# Patient Record
Sex: FEMALE | Race: WHITE | NOT HISPANIC OR LATINO | Employment: OTHER | URBAN - METROPOLITAN AREA
[De-identification: names, ages, dates, MRNs, and addresses within clinical notes are randomized per-mention and may not be internally consistent; named-entity substitution may affect disease eponyms.]

---

## 2017-01-12 DIAGNOSIS — C50.911 BREAST CANCER, RIGHT: ICD-10-CM

## 2017-01-12 RX ORDER — ANASTROZOLE 1 MG/1
TABLET ORAL
Qty: 30 TABLET | Refills: 0 | Status: SHIPPED | OUTPATIENT
Start: 2017-01-12 | End: 2017-01-23 | Stop reason: SDUPTHER

## 2017-01-13 RX ORDER — AMIODARONE HYDROCHLORIDE 100 MG/1
TABLET ORAL
Qty: 30 TABLET | Refills: 0 | Status: SHIPPED | OUTPATIENT
Start: 2017-01-13 | End: 2017-02-13 | Stop reason: SDUPTHER

## 2017-01-23 ENCOUNTER — INFUSION (OUTPATIENT)
Dept: INFUSION THERAPY | Facility: HOSPITAL | Age: 77
End: 2017-01-23
Attending: INTERNAL MEDICINE
Payer: MEDICARE

## 2017-01-23 ENCOUNTER — OFFICE VISIT (OUTPATIENT)
Dept: HEMATOLOGY/ONCOLOGY | Facility: CLINIC | Age: 77
End: 2017-01-23
Payer: MEDICARE

## 2017-01-23 VITALS
OXYGEN SATURATION: 98 % | HEART RATE: 85 BPM | WEIGHT: 131.38 LBS | SYSTOLIC BLOOD PRESSURE: 98 MMHG | TEMPERATURE: 98 F | HEIGHT: 64 IN | BODY MASS INDEX: 22.43 KG/M2 | DIASTOLIC BLOOD PRESSURE: 62 MMHG | RESPIRATION RATE: 20 BRPM

## 2017-01-23 DIAGNOSIS — Z79.811 AROMATASE INHIBITOR USE: ICD-10-CM

## 2017-01-23 DIAGNOSIS — M85.80 OSTEOPENIA: Chronic | ICD-10-CM

## 2017-01-23 DIAGNOSIS — I48.20 CHRONIC ATRIAL FIBRILLATION: ICD-10-CM

## 2017-01-23 DIAGNOSIS — C50.411 MALIGNANT NEOPLASM OF UPPER-OUTER QUADRANT OF RIGHT FEMALE BREAST: Primary | ICD-10-CM

## 2017-01-23 DIAGNOSIS — I42.1 HOCM (HYPERTROPHIC OBSTRUCTIVE CARDIOMYOPATHY): ICD-10-CM

## 2017-01-23 PROCEDURE — 96365 THER/PROPH/DIAG IV INF INIT: CPT | Mod: PO

## 2017-01-23 PROCEDURE — 25000003 PHARM REV CODE 250: Mod: PO | Performed by: INTERNAL MEDICINE

## 2017-01-23 PROCEDURE — 99999 PR PBB SHADOW E&M-EST. PATIENT-LVL III: CPT | Mod: PBBFAC,,, | Performed by: INTERNAL MEDICINE

## 2017-01-23 PROCEDURE — 99213 OFFICE O/P EST LOW 20 MIN: CPT | Mod: 25,S$GLB,, | Performed by: INTERNAL MEDICINE

## 2017-01-23 PROCEDURE — 63600175 PHARM REV CODE 636 W HCPCS: Mod: PO | Performed by: INTERNAL MEDICINE

## 2017-01-23 RX ORDER — HEPARIN 100 UNIT/ML
500 SYRINGE INTRAVENOUS
Status: CANCELLED | OUTPATIENT
Start: 2017-01-23

## 2017-01-23 RX ORDER — SODIUM CHLORIDE 0.9 % (FLUSH) 0.9 %
10 SYRINGE (ML) INJECTION
Status: CANCELLED | OUTPATIENT
Start: 2017-01-23

## 2017-01-23 RX ORDER — ANASTROZOLE 1 MG/1
1 TABLET ORAL DAILY
Qty: 30 TABLET | Refills: 11 | Status: SHIPPED | OUTPATIENT
Start: 2017-01-23 | End: 2017-09-14

## 2017-01-23 RX ADMIN — ZOLEDRONIC ACID 3.3 MG: 4 INJECTION, SOLUTION, CONCENTRATE INTRAVENOUS at 11:01

## 2017-01-23 RX ADMIN — SODIUM CHLORIDE: 900 INJECTION, SOLUTION INTRAVENOUS at 11:01

## 2017-01-23 NOTE — MR AVS SNAPSHOT
"Patient Information     Patient Name Sex Karena Pearl Female 1940      Visit Information        Provider Department Dept Phone Center    2017 11:30 AM [facility] Chemo Infusion [facility] Chemotherapy Infusion [phone] [facility]      Patient Instructions     None      Your Current Medications Are     amiodarone (PACERONE) 100 MG Tab    anastrozole (ARIMIDEX) 1 mg Tab    aspirin 81 mg Tab    atenolol (TENORMIN) 50 MG tablet    atorvastatin (LIPITOR) 20 MG tablet    calcium citrate-vitamin D3 315-200 mg (CITRACAL+D) 315-200 mg-unit per tablet    cholecalciferol, vitamin D3, 1,000 unit capsule    hydrochlorothiazide (MICROZIDE) 12.5 mg capsule    lisinopril 10 MG tablet    walker (ULTRA-LIGHT ROLLATOR) Misc    anastrozole (ARIMIDEX) 1 mg Tab (Discontinued)      Facility-Administered Medications     sodium chloride 0.9% 100 mL flush bag    zoledronic acid (ZOMETA) 3.3 mg in sodium chloride 0.9% 104.13 mL IVPB      Appointments for Next Year     3/16/2017  1:00 PM DEFIBRILLATOR TUNE UP (20 min.) [facility] - Arrhythmia PACEMAKER, ICD    Arrive at check-in approximately 15 minutes before your scheduled appointment time. Bring all outside medical records and imaging, along with a list of your current medications and insurance card.    Clinic [place] - 3rd Floor    2017 10:20 AM ESTABLISHED PATIENT (20 min.) [facility] - Hemotology Oncology Katherine Pemberton MD    Arrive at check-in approximately 15 minutes before your scheduled appointment time. Bring all outside medical records and imaging, along with a list of your current medications and insurance card.    (off [facility]) 3rd Floor         Default Flowsheet Data (last 24 hours)      Amb Complex Vitals Charles        17 1144 17 1046             Measurements    Weight  59.6 kg (131 lb 6.3 oz)       Height  5' 4" (1.626 m)       BSA (Calculated - sq m)  1.64 sq meters       BMI (Calculated)  22.6       BP  98/62       Temp  97.6 °F (36.4 °C)       " Pulse  85       Resp  20       SpO2  98 %       Pain Assessment    Pain Score Zero Zero               Allergies     No Known Allergies      Medications You Received from 01/22/2017 1209 to 01/23/2017 1209        Date/Time Order Dose Route Action     01/23/2017 1155 zoledronic acid (ZOMETA) 3.3 mg in sodium chloride 0.9% 104.13 mL IVPB 3.3 mg Intravenous New Bag      Current Discharge Medication List     Cannot display discharge medications since this is not an admission.

## 2017-01-23 NOTE — PROGRESS NOTES
Hematology/Oncology Established Visit    [facility] Diagnosis: Right sided breast cancer, ER+/MA neg/Mmu0Jck neg    Stage: Unknown. pT1b    Pathology: Infiltrating ductal carcinoma, 0.6cm, margins negative. ER+/MA neg/Axk1Cpq neg.    Prior Treatment: Right breast lumpectomy on 5/5/15 by Dr. Pacheco    Current Treatment: Adjuvant endocrine therapy, started 6/2015    History of Present Illness:  Mr. Young is a 76 y/o female with hypertrophic cardiomyopathy, ICD, and right-sided breast cancer. She had an abnormal screening mammogram in March 2015, followed by abnormal diagnostic mammogram and breast ultrasound. She underwent initial biopsy at [facility], which was positive for breast cancer. She then underwent R breast lumpectomy by Dr. Pacheco on 5/5/15. She did not undergo sentinel LN dissection due to comorbidities and feeling that she would likely not be a candidate for chemotherapy. After our intial meeting, we did discuss that due to her decreased performance status and cardiomyopathy, she was not a candidate for chemotherapy. She also met with Radiation Oncology. They are also recommended against adjuvant radiation. She is taking adjuvant Letrozole. She denies any recurrent hospitalizations for health problems aside from cardiac procedures for ICD/pacemaker implantation, ablation surgery. No recurrent infections. No chronic LE edema. No pulmonary edema. She has hypertrophic obstructive cardiomyopathy, causing diastolic dysfunction, but does not require Lasix. She does have significant SOB and HOBBS. She gets quite winded just walked on her driveway to her mailbox. She cannot walk a full block without stopping to rest.     Today, she comes in today for follow up. She is now living with her sister Kathy. She has chronic arthralgias in knees, hips, occasionally lower back. No breast pain. She is tolerating Arimidex well without any new arthralgias.    ROS:  General:  No wt loss, fever/chills, fatigue, night  sweats  Eyes: No vision problems, pain or inflammation.   Ears/Nose: No difficultly hearing, ear pain, rhinorrhea, or epistaxis  Oropharynx: No ulcers, dysphagia, or odynophagia  Cardiovascular: No chest pains, PND. SOB and HOBBS, chronic  Pulmonary: No cough, sob, hemoptysis  Gastrointestional: No n/v/d, melena, hematochezia, or change in bowel habits  : No dysuria, hematuria, pelvic pain or flank pain  Musculoskeletal: No myalgias, weakness, or arthralgias  Neurological: No headaches, focal deficits, or seizure activity  Endocrine: No heat or cold intolerance   Skin: No rashes pruritus, or lesions  Psychiatric: No symptoms of mood disorders  Heme/Lymph: No lymph node enlargment    Past Medical History   Diagnosis Date    *Atrial fibrillation     *Atrial flutter     Breast cancer     Cardiomyopathy     HOCM (hypertrophic obstructive cardiomyopathy) 9/6/2013    Hyperlipidemia     Hypertension     Macular degeneration     Orthostatic hypotension     Ventricular tachycardia        Social History: . 3 children. Never smoker, no EtOH/illicits. She is Merline Livingston's mother-in-law.    Family History: family history includes Cancer (age of onset: 60) in her brother. No h/o breast cancer in any family members.     Physical Exam:  Vitals:    01/23/17 1046   BP: 98/62   Pulse: 85   Resp: 20   Temp: 97.6 °F (36.4 °C)     Body mass index is 22.55 kg/(m^2).   ECOG PS: 2  General:  AAOx4, no acute distress  HEENT: EOMI. Normocephalic and atraumatic. No maxillary sinus tenderness. External auditory canals clear without lesions. Nasal and oral mucosal membranes moist. Normal dentition and gums.   Neck: no LAD, thyromegaly, normal ROM  Breasts: Left breast without any masses, skin changes. Right breast with faint surgical scar in lateral aspect, nontender. No axillary LAD bilaterally.  Pulmonary: Bilaterally clear to auscultation, Normal effort with no accessory muscle use, no wheezes/rales/rhonchi  CV: Normal  rate, regular rhythm, no murmurs/rubs/gallops, no edema. Pacemaker/ICD in left chest  ABD:  Soft, nontender, nondistended and without hepatosplenomegaly   Ext: No clubbing, cyanosis, or edema, normal ROM  Skin: No rashes, lesions, bruising or petechiae  Neurological: No focal deficits, CN II to XII grossly intact, normal coordination    Psychiatric:  Normal mood, affect and judgement  Hem/Lymph:  No cervical, supraclavicular, axillary LAD.    Labs:    Lab Results   Component Value Date    WBC 5.01 01/23/2017    HGB 12.9 01/23/2017    HCT 38.5 01/23/2017    MCV 92 01/23/2017     01/23/2017     BMP  Lab Results   Component Value Date     01/23/2017    K 3.7 01/23/2017     01/23/2017    CO2 27 01/23/2017    BUN 31 (H) 01/23/2017    CREATININE 1.2 01/23/2017    CALCIUM 9.6 01/23/2017    ANIONGAP 13 01/23/2017    ESTGFRAFRICA 51 (A) 01/23/2017    EGFRNONAA 44 (A) 01/23/2017     Lab Results   Component Value Date    ALT 11 06/17/2016    AST 20 06/17/2016    ALKPHOS 75 06/17/2016    BILITOT 0.7 06/17/2016       No results found for: IRON, TIBC, FERRITIN, SATURATEDIRO  No results found for: [id number]  No results found for: FOLATE  Lab Results   Component Value Date    TSH 3.070 03/17/2016       Imaging:  3/26/15 Mammogram: There is a round mass with indistinct margins seen in the middle lateral region of the right breast. Lesion may be witin the upper right breast.  Images were evaluated with a Computer Aided Detection (CAD) system.   Impression  Mass in the right breast requires additional evaluation. Spot compression is recommended. Ultrasound if necessary.     4/14/15 Mammogram and u/s:  There is an irregular mass with spiculated margins and associated calcifications seen in the right breast at 10 o'clock. Digital tomosynthesis was performed and used in the interpretation of the  Images. Images were evaluated with a Computer Aided Detectin (CAD) system.    RIGHT LIMITED ULTRASOUND FINDINGS:  On  ultrasound, the area in question is hypoechoic and measures 8 millimeters. Ultrasound is suggestive of a solid mass.  Impression  Solid mass in the right breast is highly suggestive of malignancy. Biopsy should be considered.     4/30/15 CXR: No acute abnormality.    Mammogram 4/14/16: Post-surgical scar in the right breast is benign-negative. Routine follow-up mammogram in 1 year is recommended.    Assessment / Plan:  Karena Young is a 76 y.o. female with hypertrophic obstructive cardiomyopathy, CKD comes in for management of breast cancer.     1. Right sided breast cancer: Estrogen positive, NC and Jws4Nen negative. pT1b tumor s/p lumpectomy. Previously discussed the reasons for surgical staging in helping to determine whether adjuvant chemotherapy is recommended or not. Also discussed the risks and benefits of adjuvant chemotherapy. In this patient with advanced age, comorbidities and most importantly significant dyspnea on exertion, would not recommend adjuvant chemotherapy. Do feel that the risks of chemotherapy including infection outweigh the benefits in her case. Therefore, we discussed that she does not need to undergo further surgical staging as it would not change our management. As her tumor is relatively small and has hormone receptor positive / Pcz1Cho negative pattern, she has a good prognosis with adjuvant endocrine therapy for 5 yrs. She was evaluated by [facility] for adjuvant radiation therapy and she was advised against pursuing radiation.   -- Continue Arimidex. Had switched from Letrozole given arthralgias. Will plan to continue for 5-10 years  -- RTC q3 months (BMP needed in 6 months)  -- Plan to f/u every 3-6 months for the 1st 2yrs, then annually to complete 5 yrs of oncology follow up.  -- Mammogram due approx 4/2017    2. Osteopenia: Seen on DEXA 3/26/15. She was started on Calcium and Vit D. 25-OH,Vit D level slightly low at 21 in 3/2016. Given initiation of aromatase inhibitor, will  check renal function and give Zometa every months, renally dosed.   -- BMP for renal function every 6 months.  -- Zometa 3.3mg IV over 15 minutes every 6 months, given today 1/23/17 - due again in 7/2017.     3. Hypertrophic obstructive cardiomyopathy: s/p ETOH ablation 4/2006.   -- Rollator walker requested / ordered    4. Atrial fibrillation: Had transient Atrial flutter and Afib in 2009 and 2010. Also had VT and was placed on Amiodarone for that with no further Afib. Has ICD/pacemaker.     5. Health maintenance: Mammo due in 4/2017. Patient has decided to stop further pap smear screening. Never had a colonoscopy. Risk/benefit discussion regarding colonoscopy held in the past. Pt has opted against colonoscopy as well.    Katherine Pemberton M.D.  Hematology Oncology

## 2017-01-23 NOTE — PLAN OF CARE
Problem: Patient Care Overview  Goal: Plan of Care Review  Outcome: Ongoing (interventions implemented as appropriate)  Pt states she is well

## 2017-02-13 RX ORDER — HYDROCHLOROTHIAZIDE 12.5 MG/1
CAPSULE ORAL
Qty: 30 CAPSULE | Refills: 6 | Status: SHIPPED | OUTPATIENT
Start: 2017-02-13 | End: 2017-09-02 | Stop reason: SDUPTHER

## 2017-02-13 RX ORDER — AMIODARONE HYDROCHLORIDE 100 MG/1
TABLET ORAL
Qty: 30 TABLET | Refills: 0 | Status: SHIPPED | OUTPATIENT
Start: 2017-02-13 | End: 2017-03-14 | Stop reason: SDUPTHER

## 2017-03-14 RX ORDER — AMIODARONE HYDROCHLORIDE 100 MG/1
TABLET ORAL
Qty: 30 TABLET | Refills: 0 | Status: SHIPPED | OUTPATIENT
Start: 2017-03-14 | End: 2017-04-15 | Stop reason: SDUPTHER

## 2017-03-16 ENCOUNTER — CLINICAL SUPPORT (OUTPATIENT)
Dept: ELECTROPHYSIOLOGY | Facility: CLINIC | Age: 77
End: 2017-03-16
Payer: MEDICARE

## 2017-03-16 DIAGNOSIS — I42.1 HYPERTROPHIC OBSTRUCTIVE CARDIOMYOPATHY: ICD-10-CM

## 2017-03-16 DIAGNOSIS — Z95.810 AUTOMATIC IMPLANTABLE CARDIOVERTER-DEFIBRILLATOR IN SITU: ICD-10-CM

## 2017-03-16 PROCEDURE — 93283 PRGRMG EVAL IMPLANTABLE DFB: CPT | Mod: S$GLB,,, | Performed by: INTERNAL MEDICINE

## 2017-03-21 ENCOUNTER — DOCUMENTATION ONLY (OUTPATIENT)
Dept: ELECTROPHYSIOLOGY | Facility: CLINIC | Age: 77
End: 2017-03-21

## 2017-03-21 NOTE — PROGRESS NOTES
Arrhythmia clinic  See email below. AFL strips reviewed by Dr. Hauser.  Will continue to monitor pt for now. No new orders.      MD Fany Crowe RN                     Show me the tracings please   Tx            Previous Messages       ----- Message -----      From: Fany Grijalva RN      Sent: 3/16/2017   1:34 PM        To: Jake Hauser MD, Ernestina Alejandro RN   Subject: AFL                                               Dear Jake,   While checking pt's ICD, there was 2 episodes of AFL on 1/11/17, total time in Mode switch on that date was 14 hrs 31 min. Pt had hx of AFL and AF in [id number] in past. Your last note says no AF since starting amiodarone. Currently not on OACs.   Asymptomatic.     Thanks,   Fany

## 2017-04-16 RX ORDER — AMIODARONE HYDROCHLORIDE 100 MG/1
TABLET ORAL
Qty: 30 TABLET | Refills: 0 | Status: SHIPPED | OUTPATIENT
Start: 2017-04-16 | End: 2017-05-16 | Stop reason: SDUPTHER

## 2017-04-24 ENCOUNTER — OFFICE VISIT (OUTPATIENT)
Dept: HEMATOLOGY/ONCOLOGY | Facility: CLINIC | Age: 77
End: 2017-04-24
Payer: MEDICARE

## 2017-04-24 VITALS
TEMPERATURE: 98 F | SYSTOLIC BLOOD PRESSURE: 110 MMHG | WEIGHT: 131.81 LBS | BODY MASS INDEX: 24.26 KG/M2 | RESPIRATION RATE: 18 BRPM | DIASTOLIC BLOOD PRESSURE: 70 MMHG | HEART RATE: 84 BPM | OXYGEN SATURATION: 99 % | HEIGHT: 62 IN

## 2017-04-24 DIAGNOSIS — C50.411 MALIGNANT NEOPLASM OF UPPER-OUTER QUADRANT OF RIGHT FEMALE BREAST: Primary | ICD-10-CM

## 2017-04-24 DIAGNOSIS — Z79.811 AROMATASE INHIBITOR USE: ICD-10-CM

## 2017-04-24 DIAGNOSIS — M85.80 OSTEOPENIA, UNSPECIFIED LOCATION: Chronic | ICD-10-CM

## 2017-04-24 PROCEDURE — 99213 OFFICE O/P EST LOW 20 MIN: CPT | Mod: S$GLB,,, | Performed by: INTERNAL MEDICINE

## 2017-04-24 PROCEDURE — 99999 PR PBB SHADOW E&M-EST. PATIENT-LVL III: CPT | Mod: PBBFAC,,, | Performed by: INTERNAL MEDICINE

## 2017-04-24 NOTE — PROGRESS NOTES
Hematology/Oncology Established Visit    [facility] Diagnosis: Right sided breast cancer, ER+/VT neg/Gab5Zbq neg    Stage: Unknown. pT1b    Pathology: Infiltrating ductal carcinoma, 0.6cm, margins negative. ER+/VT neg/Hqb5Xyt neg.    Prior Treatment: Right breast lumpectomy on 5/5/15 by Dr. Pacheco    Current Treatment: Adjuvant endocrine therapy, started 6/2015    History of Present Illness:  Mr. Young is a 74 y/o female with hypertrophic cardiomyopathy, ICD, and right-sided breast cancer. She had an abnormal screening mammogram in March 2015, followed by abnormal diagnostic mammogram and breast ultrasound. She underwent initial biopsy at [facility] [place], which was positive for breast cancer. She then underwent R breast lumpectomy by Dr. Pacheco on 5/5/15. She did not undergo sentinel LN dissection due to comorbidities and feeling that she would likely not be a candidate for chemotherapy. After our intial meeting, we did discuss that due to her decreased performance status and cardiomyopathy, she was not a candidate for chemotherapy. She also met with Radiation Oncology. They are also recommended against adjuvant radiation. She is taking adjuvant Letrozole. She denies any recurrent hospitalizations for health problems aside from cardiac procedures for ICD/pacemaker implantation, ablation surgery. No recurrent infections. No chronic LE edema. No pulmonary edema. She has hypertrophic obstructive cardiomyopathy, causing diastolic dysfunction, but does not require Lasix. She does have significant SOB and HOBBS. She gets quite winded just walked on her driveway to her mailbox. She cannot walk a full block without stopping to rest.     Today, she comes in today for follow up. She is now living with her sister Kathy. She has chronic arthralgias in knees, hips, occasionally lower back. No breast pain. She is tolerating Arimidex well without any new arthralgias. She has no new complaints.    ROS:  General:  No wt loss,  fever/chills, fatigue, night sweats  Eyes: No vision problems, pain or inflammation.   Ears/Nose: No difficultly hearing, ear pain, rhinorrhea, or epistaxis  Oropharynx: No ulcers, dysphagia, or odynophagia  Cardiovascular: No chest pains, PND. SOB and HOBBS, chronic  Pulmonary: No cough, sob, hemoptysis  Gastrointestional: No n/v/d, melena, hematochezia, or change in bowel habits  : No dysuria, hematuria, pelvic pain or flank pain  Musculoskeletal: No myalgias, weakness, or arthralgias  Neurological: No headaches, focal deficits, or seizure activity  Endocrine: No heat or cold intolerance   Skin: No rashes pruritus, or lesions  Psychiatric: No symptoms of mood disorders  Heme/Lymph: No lymph node enlargment    Past Medical History:   Diagnosis Date    *Atrial fibrillation     *Atrial flutter     Breast cancer     Cardiomyopathy     HOCM (hypertrophic obstructive cardiomyopathy) 9/6/2013    Hyperlipidemia     Hypertension     Macular degeneration     Orthostatic hypotension     Ventricular tachycardia        Social History: . 3 children. Never smoker, no EtOH/illicits. She is Merline Livingston's mother-in-law.    Family History: family history includes Cancer (age of onset: 60) in her brother. No h/o breast cancer in any family members.     Physical Exam:  Vitals:    04/24/17 1041   BP: 110/70   Pulse: 84   Resp: 18   Temp: 97.5 °F (36.4 °C)     Body mass index is 24.11 kg/(m^2).   ECOG PS: 2  General:  AAOx4, no acute distress  HEENT: EOMI. Normocephalic and atraumatic. No maxillary sinus tenderness. External auditory canals clear without lesions. Nasal and oral mucosal membranes moist. Normal dentition and gums.   Neck: no LAD, thyromegaly, normal ROM  Breasts: Left breast without any masses, skin changes. Right breast with faint surgical scar in lateral aspect, nontender. No axillary LAD bilaterally.  Pulmonary: Bilaterally clear to auscultation, Normal effort with no accessory muscle use, no  wheezes/rales/rhonchi  CV: Normal rate, regular rhythm, no murmurs/rubs/gallops, no edema. Pacemaker/ICD in left chest  ABD:  Soft, nontender, nondistended and without hepatosplenomegaly   Ext: No clubbing, cyanosis, or edema, normal ROM  Skin: No rashes, lesions, bruising or petechiae  Neurological: No focal deficits, CN II to XII grossly intact, normal coordination    Psychiatric:  Normal mood, affect and judgement  Hem/Lymph:  No cervical, supraclavicular, axillary LAD.    Labs:    Lab Results   Component Value Date    WBC 5.01 01/23/2017    HGB 12.9 01/23/2017    HCT 38.5 01/23/2017    MCV 92 01/23/2017     01/23/2017     BMP  Lab Results   Component Value Date     01/23/2017    K 3.7 01/23/2017     01/23/2017    CO2 27 01/23/2017    BUN 31 (H) 01/23/2017    CREATININE 1.2 01/23/2017    CALCIUM 9.6 01/23/2017    ANIONGAP 13 01/23/2017    ESTGFRAFRICA 51 (A) 01/23/2017    EGFRNONAA 44 (A) 01/23/2017     Lab Results   Component Value Date    ALT 11 06/17/2016    AST 20 06/17/2016    ALKPHOS 75 06/17/2016    BILITOT 0.7 06/17/2016       No results found for: IRON, TIBC, FERRITIN, SATURATEDIRO  No results found for: [id number]  No results found for: FOLATE  Lab Results   Component Value Date    TSH 3.070 03/17/2016       Imaging:  3/26/15 Mammogram: There is a round mass with indistinct margins seen in the middle lateral region of the right breast. Lesion may be witin the upper right breast.  Images were evaluated with a Computer Aided Detection (CAD) system.   Impression  Mass in the right breast requires additional evaluation. Spot compression is recommended. Ultrasound if necessary.     4/14/15 Mammogram and u/s:  There is an irregular mass with spiculated margins and associated calcifications seen in the right breast at 10 o'clock. Digital tomosynthesis was performed and used in the interpretation of the  Images. Images were evaluated with a Computer Aided Detectin (CAD) system.    RIGHT  LIMITED ULTRASOUND FINDINGS:  On ultrasound, the area in question is hypoechoic and measures 8 millimeters. Ultrasound is suggestive of a solid mass.  Impression  Solid mass in the right breast is highly suggestive of malignancy. Biopsy should be considered.     4/30/15 CXR: No acute abnormality.    Mammogram 4/14/16: Post-surgical scar in the right breast is benign-negative. Routine follow-up mammogram in 1 year is recommended.    Assessment / Plan:  Karena oYung is a 77 y.o. female with hypertrophic obstructive cardiomyopathy, CKD comes in for management of breast cancer.     1. Right sided breast cancer: Estrogen positive, MD and Mld7Upp negative. pT1b tumor s/p lumpectomy. Previously discussed the reasons for surgical staging in helping to determine whether adjuvant chemotherapy is recommended or not. Also discussed the risks and benefits of adjuvant chemotherapy. In this patient with advanced age, comorbidities and most importantly significant dyspnea on exertion, would not recommend adjuvant chemotherapy. Do feel that the risks of chemotherapy including infection outweigh the benefits in her case. Therefore, we discussed that she does not need to undergo further surgical staging as it would not change our management. As her tumor is relatively small and has hormone receptor positive / Qyd8Xoz negative pattern, she has a good prognosis with adjuvant endocrine therapy for 5 yrs. She was evaluated by [facility] for adjuvant radiation therapy and she was advised against pursuing radiation.   -- Continue Arimidex. Had switched from Letrozole given arthralgias. Will plan to continue for 5-10 years  -- RTC q3 months (BMP needed in 6 months)  -- Plan to f/u every 3-6 months for the 1st 2yrs, then annually to complete 5 yrs of oncology follow up.  -- Mammogram due approx 4/2017    2. Osteopenia: Seen on DEXA 3/26/15. She was started on Calcium and Vit D. 25-OH,Vit D level slightly low at 21 in 3/2016. Given initiation  of aromatase inhibitor, will check renal function and give Zometa every months, renally dosed.   -- BMP for renal function every 6 months.  -- Zometa 3.3mg IV over 15 minutes every 6 months, given 1/23/17 - due again in 7/2017.     3. Hypertrophic obstructive cardiomyopathy: s/p ETOH ablation 4/2006.   -- Rollator walker requested / ordered at past visit.    4. Atrial fibrillation: Had transient Atrial flutter and Afib in 2009 and 2010. Also had VT and was placed on Amiodarone for that with no further Afib. Has ICD/pacemaker.     5. Health maintenance: Mammo due in 4/2017. Patient has decided to stop further pap smear screening. Never had a colonoscopy. Risk/benefit discussion regarding colonoscopy held in the past. Pt has opted against colonoscopy as well.    Katherine Pemberton M.D.  Hematology Oncology

## 2017-04-27 ENCOUNTER — OFFICE VISIT (OUTPATIENT)
Dept: INTERNAL MEDICINE | Facility: CLINIC | Age: 77
End: 2017-04-27
Payer: MEDICARE

## 2017-04-27 ENCOUNTER — LAB VISIT (OUTPATIENT)
Dept: LAB | Facility: HOSPITAL | Age: 77
End: 2017-04-27
Attending: FAMILY MEDICINE
Payer: MEDICARE

## 2017-04-27 VITALS
TEMPERATURE: 98 F | SYSTOLIC BLOOD PRESSURE: 100 MMHG | BODY MASS INDEX: 24.14 KG/M2 | WEIGHT: 131.19 LBS | HEIGHT: 62 IN | DIASTOLIC BLOOD PRESSURE: 60 MMHG

## 2017-04-27 DIAGNOSIS — R41.3 MEMORY LOSS: ICD-10-CM

## 2017-04-27 DIAGNOSIS — R41.3 MEMORY LOSS: Primary | ICD-10-CM

## 2017-04-27 LAB
BILIRUB UR QL STRIP: NEGATIVE
CLARITY UR REFRACT.AUTO: CLEAR
COLOR UR AUTO: YELLOW
GLUCOSE UR QL STRIP: NEGATIVE
HGB UR QL STRIP: NEGATIVE
KETONES UR QL STRIP: NEGATIVE
LEUKOCYTE ESTERASE UR QL STRIP: NEGATIVE
MICROSCOPIC COMMENT: NORMAL
NITRITE UR QL STRIP: NEGATIVE
PH UR STRIP: 5 [PH] (ref 5–8)
PROT UR QL STRIP: NEGATIVE
SP GR UR STRIP: 1.01 (ref 1–1.03)
SQUAMOUS #/AREA URNS AUTO: 1 /HPF
URN SPEC COLLECT METH UR: NORMAL
UROBILINOGEN UR STRIP-ACNC: NEGATIVE EU/DL

## 2017-04-27 PROCEDURE — 99999 PR PBB SHADOW E&M-EST. PATIENT-LVL III: CPT | Mod: PBBFAC,,, | Performed by: FAMILY MEDICINE

## 2017-04-27 PROCEDURE — 84443 ASSAY THYROID STIM HORMONE: CPT

## 2017-04-27 PROCEDURE — 99214 OFFICE O/P EST MOD 30 MIN: CPT | Mod: S$GLB,,, | Performed by: FAMILY MEDICINE

## 2017-04-27 PROCEDURE — 85025 COMPLETE CBC W/AUTO DIFF WBC: CPT

## 2017-04-27 PROCEDURE — 36415 COLL VENOUS BLD VENIPUNCTURE: CPT | Mod: PO

## 2017-04-27 PROCEDURE — 80053 COMPREHEN METABOLIC PANEL: CPT

## 2017-04-27 PROCEDURE — 82607 VITAMIN B-12: CPT

## 2017-04-27 NOTE — MR AVS SNAPSHOT
[facility]  [address] [place] [address]  Phone: [phone]  Fax: [phone]                  Karena Young   2017 2:20 PM   Office Visit    Description:  Female : 1940   Provider:  Lee Dwyer MD   Department:  [facility]           Reason for Visit     Memory Loss           Diagnoses this Visit        Comments    Memory loss    -  Primary            To Do List           Future Appointments        Provider Department Dept Phone    2017 9:30 AM [facility] MAMMO2-DX [facility] [phone]    5/3/2017 2:00 PM BR CT1 LIMIT 500 LBS [facility] - [phone]    2017 8:00 AM HOME MONITOR DEVICE CHECK, [facility] - Arrhythmia [phone]    2017 10:20 AM LABORATORY, [facility] [phone]    2017 10:40 AM Katherine Pemberton MD [facility] Hemotology Oncology [phone]      Goals (5 Years of Data)     None      [facility]     [facility] Nurse Care Line -  Assistance  Unless otherwise directed by your provider, please contact [facility], our nurse care line that is available for  assistance.     Registered nurses in the [facility] provide: appointment scheduling, clinical advisement, health education, and other advisory services.  Call: [phone] (toll free)               Medications           Message regarding Medications     Verify the changes and/or additions to your medication regime listed below are the same as discussed with your clinician today.  If any of these changes or additions are incorrect, please notify your healthcare provider.             Verify that the below list of medications is an accurate representation of the medications you are currently taking.  If none reported, the list may be blank. If incorrect, please contact your healthcare provider. Carry this list with you in case of emergency.          "  Current Medications     amiodarone (PACERONE) 100 MG Tab TAKE 1 TABLET BY MOUTH EVERY DAY    anastrozole (ARIMIDEX) 1 mg Tab Take 1 tablet (1 mg total) by mouth once daily.    aspirin 81 mg Tab Take 1 tablet by mouth Daily.     atenolol (TENORMIN) 50 MG tablet Take 1 tablet (50 mg total) by mouth every evening.    atorvastatin (LIPITOR) 20 MG tablet TAKE 1 TABLET BY MOUTH DAILY    calcium citrate-vitamin D3 315-200 mg (CITRACAL+D) 315-200 mg-unit per tablet Take 1 tablet by mouth 2 (two) times daily.      hydrochlorothiazide (MICROZIDE) 12.5 mg capsule TAKE 1 CAPSULE(12.5 MG) BY MOUTH EVERY DAY    lisinopril 10 MG tablet Take 0.5 tablets (5 mg total) by mouth once daily. 1 tablet Oral Every day    cholecalciferol, vitamin D3, 1,000 unit capsule Take 1 capsule (1,000 Units total) by mouth once daily.    walker (ULTRA-LIGHT ROLLATOR) Misc 1 each by Misc.(Non-Drug; Combo Route) route daily as needed.           Clinical Reference Information           Your Vitals Were     BP Temp Height Weight BMI    100/60 (BP Location: Right arm, Patient Position: Sitting, BP Method: Manual) 97.5 °F (36.4 °C) (Tympanic) 5' 2" (1.575 m) 59.5 kg (131 lb 2.8 oz) 23.99 kg/m2      Blood Pressure          Most Recent Value    BP  100/60      Allergies as of 4/27/2017     No Known Allergies      Immunizations Administered on Date of Encounter - 4/27/2017     None      Orders Placed During Today's Visit     Future Labs/Procedures Expected by Expires    CBC auto differential  4/27/2017 7/26/2017    Comprehensive metabolic panel  4/27/2017 7/26/2017    CT Head Without Contrast  4/27/2017 4/27/2018    TSH  4/27/2017 7/26/2017    Urinalysis  4/27/2017 7/26/2017    Vitamin B12  4/27/2017 7/26/2017      [facility] Sign-Up     Activating your [facility] account is as easy as 1-2-3!     1) Visit [email or web address], select Sign Up Now, enter this activation code and your date of birth, then select Next.  Activation code not generated  Current Patient " Portal Status: Account disabled      2) Create a username and password to use when you visit [facility] in the future and select a security question in case you lose your password and select Next.    3) Enter your e-mail address and click Sign Up!    Additional Information  If you have questions, please e-mail [email or web address] or call [phone] to talk to our [facility] staff. Remember, [facility] is NOT to be used for urgent needs. For medical emergencies, dial 911.         Language Assistance Services     ATTENTION: Language assistance services are available, free of charge. Please call [phone].      ATENCIÓN: Si habla juan pablo, tiene a hewitt disposición servicios gratuitos de asistencia lingüística. Llame al [phone].     CHÚ Ý: N?u b?n nói Ti?ng Vi?t, có các d?ch v? h? tr? ngôn ng? mi?n phí dành cho b?n. G?i s? [phone].         [facility] complies with applicable Federal civil rights laws and does not discriminate on the basis of race, color, national origin, age, disability, or sex.

## 2017-04-28 LAB
ALBUMIN SERPL BCP-MCNC: 4.1 G/DL
ALP SERPL-CCNC: 76 U/L
ALT SERPL W/O P-5'-P-CCNC: 14 U/L
ANION GAP SERPL CALC-SCNC: 11 MMOL/L
AST SERPL-CCNC: 22 U/L
BASOPHILS # BLD AUTO: 0.02 K/UL
BASOPHILS NFR BLD: 0.4 %
BILIRUB SERPL-MCNC: 0.5 MG/DL
BUN SERPL-MCNC: 42 MG/DL
CALCIUM SERPL-MCNC: 10.2 MG/DL
CHLORIDE SERPL-SCNC: 98 MMOL/L
CO2 SERPL-SCNC: 30 MMOL/L
CREAT SERPL-MCNC: 1.5 MG/DL
DIFFERENTIAL METHOD: NORMAL
EOSINOPHIL # BLD AUTO: 0.1 K/UL
EOSINOPHIL NFR BLD: 2.7 %
ERYTHROCYTE [DISTWIDTH] IN BLOOD BY AUTOMATED COUNT: 13.4 %
EST. GFR  (AFRICAN AMERICAN): 38.5 ML/MIN/1.73 M^2
EST. GFR  (NON AFRICAN AMERICAN): 33.4 ML/MIN/1.73 M^2
GLUCOSE SERPL-MCNC: 89 MG/DL
HCT VFR BLD AUTO: 41.1 %
HGB BLD-MCNC: 13.6 G/DL
LYMPHOCYTES # BLD AUTO: 1 K/UL
LYMPHOCYTES NFR BLD: 18.6 %
MCH RBC QN AUTO: 30.4 PG
MCHC RBC AUTO-ENTMCNC: 33.1 %
MCV RBC AUTO: 92 FL
MONOCYTES # BLD AUTO: 0.6 K/UL
MONOCYTES NFR BLD: 10.6 %
NEUTROPHILS # BLD AUTO: 3.5 K/UL
NEUTROPHILS NFR BLD: 67.5 %
PLATELET # BLD AUTO: 243 K/UL
PMV BLD AUTO: 11.3 FL
POTASSIUM SERPL-SCNC: 3.8 MMOL/L
PROT SERPL-MCNC: 7.6 G/DL
RBC # BLD AUTO: 4.47 M/UL
SODIUM SERPL-SCNC: 139 MMOL/L
TSH SERPL DL<=0.005 MIU/L-ACNC: 3.1 UIU/ML
VIT B12 SERPL-MCNC: 256 PG/ML
WBC # BLD AUTO: 5.21 K/UL

## 2017-04-30 NOTE — PROGRESS NOTES
"Subjective:      Patient ID: Karena Young is a 77 y.o. female.    Chief Complaint: Memory Loss    HPI  76 yo female here today with her sister Bernabe and her daughter.  Pt's family has noticed a change in her memory.  Mainly issues with short term memory loss.  Pt agrees.  She does suffer with hearing loss and does have hearing aides but feels that contributes as well.  Lost her  about 7 mos ago, moved in with her sister.  This seemed to have really make the memory changes more obvious.    Stable with her meds, sister helps to make sure she has them as she may forget.  Living in [place] now.    Denies feeling bad.  Sleeping well.    Some sadness/feeling down when she thinks about her .    Not really driving anymore.  Performing ADLs on her own.    Past Medical History:   Diagnosis Date    *Atrial fibrillation     *Atrial flutter     Breast cancer     Cardiomyopathy     HOCM (hypertrophic obstructive cardiomyopathy) 9/6/2013    Hyperlipidemia     Hypertension     Macular degeneration     Orthostatic hypotension     Ventricular tachycardia      Family History   Problem Relation Age of Onset    Cancer Brother 60     Past Surgical History:   Procedure Laterality Date    BREAST BIOPSY      CARDIAC ELECTROPHYSIOLOGY STUDY AND ABLATION  4/06    CATARACT EXTRACTION      ICD implantation      INSERT / REPLACE / REMOVE PACEMAKER       Social History   Substance Use Topics    Smoking status: Never Smoker    Smokeless tobacco: Never Used    Alcohol use No       /60 (BP Location: Right arm, Patient Position: Sitting, BP Method: Manual)  Temp 97.5 °F (36.4 °C) (Tympanic)   Ht 5' 2" (1.575 m)  Wt 59.5 kg (131 lb 2.8 oz)  BMI 23.99 kg/m2    Review of Systems   Constitutional: Negative.    HENT: Positive for hearing loss.    Respiratory: Negative.    Cardiovascular: Negative.    Gastrointestinal: Negative.    Genitourinary: Negative.    Neurological: Negative.      Objective: "     Physical Exam   Constitutional: She is oriented to person, place, and time. She appears well-developed and well-nourished. No distress.   Eyes: Pupils are equal, round, and reactive to light.   Neck: Normal range of motion. Neck supple. No thyromegaly present.   Cardiovascular: Normal rate, regular rhythm and normal heart sounds.    Pulmonary/Chest: Effort normal and breath sounds normal. No respiratory distress. She has no wheezes. She has no rales.   Abdominal: Soft. Bowel sounds are normal. She exhibits no distension. There is no tenderness.   Musculoskeletal: She exhibits no edema.   Neurological: She is alert and oriented to person, place, and time. No cranial nerve deficit.   Skin: Skin is warm and dry. No rash noted.   Psychiatric: She has a normal mood and affect. Her behavior is normal. Judgment and thought content normal.   Nursing note and vitals reviewed.      Lab Results   Component Value Date    WBC 5.21 04/27/2017    HGB 13.6 04/27/2017    HCT 41.1 04/27/2017     04/27/2017    CHOL 169 03/17/2016    TRIG 77 03/17/2016    HDL 56 03/17/2016    ALT 14 04/27/2017    AST 22 04/27/2017     04/27/2017    K 3.8 04/27/2017    CL 98 04/27/2017    CREATININE 1.5 (H) 04/27/2017    BUN 42 (H) 04/27/2017    CO2 30 (H) 04/27/2017    TSH 3.105 04/27/2017    INR 1.0 08/14/2013       Assessment:     1. Memory loss       Plan:   Memory loss  -     CT Head Without Contrast; Future; Expected date: 4/27/17  -     CBC auto differential; Future; Expected date: 4/27/17  -     Comprehensive metabolic panel; Future; Expected date: 4/27/17  -     TSH; Future; Expected date: 4/27/17  -     Vitamin B12; Future; Expected date: 4/27/17  -     Urinalysis; Future; Expected date: 4/27/17    Other orders  -     Urinalysis Microscopic    Mini mental exam 25/30.    Some mild impairment noted.  Will CT of brain and some labs/UA.  Discussed likelihood of some early dementia changes with pt, sister and daughter.    Discussed  meds and will consider starting aricept once above is reviewed.  Would advise f/u in about 3  mos

## 2017-05-03 ENCOUNTER — HOSPITAL ENCOUNTER (OUTPATIENT)
Dept: RADIOLOGY | Facility: HOSPITAL | Age: 77
Discharge: HOME | End: 2017-05-03
Attending: FAMILY MEDICINE
Payer: MEDICARE

## 2017-05-03 DIAGNOSIS — R41.3 MEMORY LOSS: ICD-10-CM

## 2017-05-03 PROCEDURE — 70450 CT HEAD/BRAIN W/O DYE: CPT | Mod: TC

## 2017-05-05 ENCOUNTER — TELEPHONE (OUTPATIENT)
Dept: INTERNAL MEDICINE | Facility: CLINIC | Age: 77
End: 2017-05-05

## 2017-05-05 RX ORDER — DONEPEZIL HYDROCHLORIDE 10 MG/1
10 TABLET, FILM COATED ORAL NIGHTLY
Qty: 30 TABLET | Refills: 11 | Status: SHIPPED | OUTPATIENT
Start: 2017-05-05 | End: 2018-05-24 | Stop reason: SDUPTHER

## 2017-05-05 NOTE — TELEPHONE ENCOUNTER
CT scan ok  Urine ok  Labs overall ok  Would like for pt to take some B complex vitamins.  I am sending aricept to start for memory.  Take 1/2 pill for first week, then the full pill.  Take at bedtime  F/u with me in 12 wks.

## 2017-05-05 NOTE — TELEPHONE ENCOUNTER
Called and let know that Dr. Dwyer said CT scan okay.  Urine okay, labs overall okay.  She would like for her to take some Vit B complex daily.  She sent in a Rx for Aricept, she will take 1/2 pill for first week, and then start 1 full pill.  Take this a bedtime.  Booked follow up on 7-28-17.

## 2017-05-16 RX ORDER — AMIODARONE HYDROCHLORIDE 100 MG/1
TABLET ORAL
Qty: 30 TABLET | Refills: 0 | Status: SHIPPED | OUTPATIENT
Start: 2017-05-16 | End: 2017-06-11 | Stop reason: SDUPTHER

## 2017-05-26 ENCOUNTER — HOSPITAL ENCOUNTER (OUTPATIENT)
Dept: RADIOLOGY | Facility: HOSPITAL | Age: 77
Discharge: HOME | End: 2017-05-26
Attending: INTERNAL MEDICINE
Payer: MEDICARE

## 2017-05-26 DIAGNOSIS — C50.411 MALIGNANT NEOPLASM OF UPPER-OUTER QUADRANT OF RIGHT FEMALE BREAST: ICD-10-CM

## 2017-05-26 PROCEDURE — 77066 DX MAMMO INCL CAD BI: CPT | Mod: TC

## 2017-05-26 PROCEDURE — 77062 BREAST TOMOSYNTHESIS BI: CPT | Mod: 26,,, | Performed by: RADIOLOGY

## 2017-05-26 PROCEDURE — 77066 DX MAMMO INCL CAD BI: CPT | Mod: 26,,, | Performed by: RADIOLOGY

## 2017-05-30 ENCOUNTER — TELEPHONE (OUTPATIENT)
Dept: HEMATOLOGY/ONCOLOGY | Facility: CLINIC | Age: 77
End: 2017-05-30

## 2017-05-30 NOTE — TELEPHONE ENCOUNTER
----- Message from Katherine Pemberton MD sent at 5/29/2017  8:06 AM CDT -----  Your mammogram looks good!. Repeat mammogram is due in 1 year.

## 2017-06-12 RX ORDER — ATORVASTATIN CALCIUM 20 MG/1
TABLET, FILM COATED ORAL
Qty: 90 TABLET | Refills: 2 | Status: SHIPPED | OUTPATIENT
Start: 2017-06-12 | End: 2018-04-07 | Stop reason: SDUPTHER

## 2017-06-12 RX ORDER — AMIODARONE HYDROCHLORIDE 100 MG/1
TABLET ORAL
Qty: 30 TABLET | Refills: 0 | Status: SHIPPED | OUTPATIENT
Start: 2017-06-12 | End: 2017-07-28 | Stop reason: SDUPTHER

## 2017-07-17 ENCOUNTER — INFUSION (OUTPATIENT)
Dept: INFUSION THERAPY | Facility: HOSPITAL | Age: 77
End: 2017-07-17
Attending: INTERNAL MEDICINE
Payer: MEDICARE

## 2017-07-17 ENCOUNTER — OFFICE VISIT (OUTPATIENT)
Dept: HEMATOLOGY/ONCOLOGY | Facility: CLINIC | Age: 77
End: 2017-07-17
Payer: MEDICARE

## 2017-07-17 ENCOUNTER — OFFICE VISIT (OUTPATIENT)
Dept: OPHTHALMOLOGY | Facility: CLINIC | Age: 77
End: 2017-07-17
Payer: MEDICARE

## 2017-07-17 VITALS
WEIGHT: 125 LBS | HEART RATE: 82 BPM | BODY MASS INDEX: 21.34 KG/M2 | DIASTOLIC BLOOD PRESSURE: 70 MMHG | HEIGHT: 64 IN | TEMPERATURE: 98 F | RESPIRATION RATE: 18 BRPM | OXYGEN SATURATION: 97 % | SYSTOLIC BLOOD PRESSURE: 110 MMHG

## 2017-07-17 DIAGNOSIS — H34.8192 CENTRAL RETINAL VEIN OCCLUSION: Primary | ICD-10-CM

## 2017-07-17 DIAGNOSIS — H35.372 EPIRETINAL MEMBRANE, LEFT: ICD-10-CM

## 2017-07-17 DIAGNOSIS — C50.411 MALIGNANT NEOPLASM OF UPPER-OUTER QUADRANT OF RIGHT BREAST IN FEMALE, ESTROGEN RECEPTOR POSITIVE: Primary | ICD-10-CM

## 2017-07-17 DIAGNOSIS — Z79.811 AROMATASE INHIBITOR USE: ICD-10-CM

## 2017-07-17 DIAGNOSIS — I42.1 HOCM (HYPERTROPHIC OBSTRUCTIVE CARDIOMYOPATHY): ICD-10-CM

## 2017-07-17 DIAGNOSIS — Z17.0 MALIGNANT NEOPLASM OF UPPER-OUTER QUADRANT OF RIGHT BREAST IN FEMALE, ESTROGEN RECEPTOR POSITIVE: Primary | ICD-10-CM

## 2017-07-17 DIAGNOSIS — M85.89 OSTEOPENIA OF MULTIPLE SITES: ICD-10-CM

## 2017-07-17 PROCEDURE — 92014 COMPRE OPH EXAM EST PT 1/>: CPT | Mod: S$GLB,,, | Performed by: OPHTHALMOLOGY

## 2017-07-17 PROCEDURE — 63600175 PHARM REV CODE 636 W HCPCS: Mod: PO | Performed by: INTERNAL MEDICINE

## 2017-07-17 PROCEDURE — 99214 OFFICE O/P EST MOD 30 MIN: CPT | Mod: 25,S$GLB,, | Performed by: INTERNAL MEDICINE

## 2017-07-17 PROCEDURE — 99999 PR PBB SHADOW E&M-EST. PATIENT-LVL III: CPT | Mod: PBBFAC,,, | Performed by: INTERNAL MEDICINE

## 2017-07-17 PROCEDURE — 99999 PR PBB SHADOW E&M-EST. PATIENT-LVL II: CPT | Mod: PBBFAC,,, | Performed by: OPHTHALMOLOGY

## 2017-07-17 PROCEDURE — 25000003 PHARM REV CODE 250: Mod: PO | Performed by: INTERNAL MEDICINE

## 2017-07-17 PROCEDURE — 96365 THER/PROPH/DIAG IV INF INIT: CPT | Mod: PO

## 2017-07-17 PROCEDURE — 1126F AMNT PAIN NOTED NONE PRSNT: CPT | Mod: S$GLB,,, | Performed by: INTERNAL MEDICINE

## 2017-07-17 PROCEDURE — 1159F MED LIST DOCD IN RCRD: CPT | Mod: S$GLB,,, | Performed by: INTERNAL MEDICINE

## 2017-07-17 RX ORDER — HEPARIN 100 UNIT/ML
500 SYRINGE INTRAVENOUS
Status: CANCELLED | OUTPATIENT
Start: 2017-07-17

## 2017-07-17 RX ORDER — SODIUM CHLORIDE 0.9 % (FLUSH) 0.9 %
10 SYRINGE (ML) INJECTION
Status: CANCELLED | OUTPATIENT
Start: 2017-07-17

## 2017-07-17 RX ADMIN — ZOLEDRONIC ACID 3 MG: 4 INJECTION, SOLUTION, CONCENTRATE INTRAVENOUS at 11:07

## 2017-07-17 NOTE — PROGRESS NOTES
===============================  07/18/2017   Karena Young,   77 y.o. female   Last visit [facility]: :10/25/2016   Last visit eye dept. Visit date not found  [facility]:  Corrected distance visual acuity was 20/20 in the right eye and 20/50 in the left eye.  Tonometry     Tonometry (Applanation, 1:11 PM)       Right Left    Pressure 17 17               Not recorded         Not recorded        Chief Complaint   Patient presents with    Macular Degeneration     BLURRY VISION    CRVO        HPI     Macular Degeneration    Additional comments: BLURRY VISION           Comments   NO DM  'AA' SMD  PCIOL OU  OD OLD RVO  OS ERM       Last edited by Cristina Layne on 7/17/2017  1:01 PM. (History)      Read Studies:   Vitals  ________________  7/17/2017  Problem List Items Addressed This Visit        Eye/Vision problems    Epiretinal membrane    Central retinal vein occlusion - Primary      Other Visit Diagnoses    None.       Stable  rtc 1 year.  .       ===========================

## 2017-07-17 NOTE — PLAN OF CARE
Problem: Patient Care Overview  Goal: Plan of Care Review  Outcome: Ongoing (interventions implemented as appropriate)  im doing ok no complaints my sister takes good care of me

## 2017-07-17 NOTE — PATIENT INSTRUCTIONS
[facility]  [address]  [address] [address]  [phone] phone     [phone] fax  Hours of Operation: Monday- Friday 8:00am- 5:00pm  After hours phone  [phone]  Hematology / Oncology Physicians on call      Dr. Bib Pemberton                      Please call with any concerns regarding your appointment today.  FALL PREVENTION   Falls often occur due to slipping, tripping or losing your balance. Here are ways to reduce your risk of falling again.   Was there anything that caused your fall that can be fixed, removed or replaced?   Make your home safe by keeping walkways clear of objects you may trip over.   Use non-slip pads under rugs.   Do not walk in poorly lit areas.   Do not stand on chairs or wobbly ladders.   Use caution when reaching overhead or looking upward. This position can cause a loss of balance.   Be sure your shoes fit properly, have non-slip bottoms and are in good condition.   Be cautious when going up and down stairs, curbs, and when walking on uneven sidewalks.   If your balance is poor, consider using a cane or walker.   If your fall was related to alcohol use, stop or limit alcohol intake.   If your fall was related to use of sleeping medicines, talk to your doctor about this. You may need to reduce your dosage at bedtime if you awaken during the night to go to the bathroom.   To reduce the need for nighttime bathroom trips:   Avoid drinking fluids for several hours before going to bed   Empty your bladder before going to bed   Men can keep a urinal at the bedside   © 1014-9510 [employer], [address] [place] [address], [place] [address]. All rights reserved. This information is not intended as a substitute for professional medical care. Always follow your healthcare professional's instructions.

## 2017-07-17 NOTE — PROGRESS NOTES
Hematology/Oncology Established Visit    [facility] Diagnosis: Right sided breast cancer, ER+/NJ neg/[id number] neg    Stage: Unknown. pT1b    Pathology: Infiltrating ductal carcinoma, 0.6cm, margins negative. ER+/NJ neg/[id number] neg.    Prior Treatment: Right breast lumpectomy on 5/5/15 by Dr. Pacheco    Current Treatment: Adjuvant endocrine therapy, started 6/2015    History of Present Illness:  Mr. Young is a 76 y/o female with hypertrophic cardiomyopathy, ICD, and right-sided breast cancer. She had an abnormal screening mammogram in March 2015, followed by abnormal diagnostic mammogram and breast ultrasound. She underwent initial biopsy at [facility], which was positive for breast cancer. She then underwent R breast lumpectomy by Dr. Pacheco on 5/5/15. She did not undergo sentinel LN dissection due to comorbidities and feeling that she would likely not be a candidate for chemotherapy. After our intial meeting, we did discuss that due to her decreased performance status and cardiomyopathy, she was not a candidate for chemotherapy. She also met with Radiation Oncology. They are also recommended against adjuvant radiation. She is taking adjuvant Letrozole. She denies any recurrent hospitalizations for health problems aside from cardiac procedures for ICD/pacemaker implantation, ablation surgery. No recurrent infections. No chronic LE edema. No pulmonary edema. She has hypertrophic obstructive cardiomyopathy, causing diastolic dysfunction, but does not require Lasix. She does have significant SOB and HOBBS. She gets quite winded just walked on her driveway to her mailbox. She cannot walk a full block without stopping to rest.     Today, she comes in today for follow up. She is now living with her sister Kathy. She has chronic arthralgias in knees, hips, occasionally lower back. No breast pain. She is tolerating Arimidex well without any new arthralgias. She has no new complaints.    Answers for HPI/ROS submitted by  the patient on 7/17/2017   appetite change : No  unexpected weight change: No  visual disturbance: No  cough: No  shortness of breath: Yes  chest pain: No  abdominal pain: No  diarrhea: No  frequency: No  back pain: No  rash: No  headaches: No  adenopathy: No  nervous/ anxious: No      Past Medical History:   Diagnosis Date    *Atrial fibrillation     *Atrial flutter     Breast cancer     Cardiomyopathy     HOCM (hypertrophic obstructive cardiomyopathy) 9/6/2013    Hyperlipidemia     Hypertension     Macular degeneration     Orthostatic hypotension     Ventricular tachycardia        Social History: . 3 children. Never smoker, no EtOH/illicits. She is Merline Livingston's mother-in-law.    Family History: family history includes Cancer (age of onset: 60) in her brother. No h/o breast cancer in any family members.     Physical Exam:  Vitals:    07/17/17 1043   BP: 110/70   Pulse: 82   Resp: 18   Temp: 97.8 °F (36.6 °C)     Body mass index is 21.46 kg/m².   ECOG PS: 2  General:  AAOx4, no acute distress  HEENT: EOMI. Normocephalic and atraumatic. No maxillary sinus tenderness. External auditory canals clear without lesions. Nasal and oral mucosal membranes moist. Normal dentition and gums.   Neck: no LAD, thyromegaly, normal ROM  Breasts: Left breast without any masses, skin changes. Right breast with faint surgical scar in lateral aspect, nontender. No axillary LAD bilaterally.  Pulmonary: Bilaterally clear to auscultation, Normal effort with no accessory muscle use, no wheezes/rales/rhonchi  CV: Normal rate, regular rhythm, no murmurs/rubs/gallops, no edema. Pacemaker/ICD in left chest  ABD:  Soft, nontender, nondistended and without hepatosplenomegaly   Ext: No clubbing, cyanosis, or edema, normal ROM  Skin: No rashes, lesions, bruising or petechiae  Neurological: No focal deficits, CN II to XII grossly intact, normal coordination    Psychiatric:  Normal mood, affect and judgement  Hem/Lymph:  No  cervical, supraclavicular, axillary LAD.    Labs:    Lab Results   Component Value Date    WBC 5.21 04/27/2017    HGB 13.6 04/27/2017    HCT 41.1 04/27/2017    MCV 92 04/27/2017     04/27/2017     BMP  Lab Results   Component Value Date     07/17/2017    K 3.7 07/17/2017    CL 98 07/17/2017    CO2 32 (H) 07/17/2017    BUN 36 (H) 07/17/2017    CREATININE 1.4 07/17/2017    CALCIUM 10.1 07/17/2017    ANIONGAP 10 07/17/2017    ESTGFRAFRICA 42 (A) 07/17/2017    EGFRNONAA 36 (A) 07/17/2017     Lab Results   Component Value Date    ALT 14 04/27/2017    AST 22 04/27/2017    ALKPHOS 76 04/27/2017    BILITOT 0.5 04/27/2017       No results found for: IRON, TIBC, FERRITIN, SATURATEDIRO  Lab Results   Component Value Date    BHVQCBDO12 256 04/27/2017     No results found for: FOLATE  Lab Results   Component Value Date    TSH 3.105 04/27/2017       Imaging:  3/26/15 Mammogram: There is a round mass with indistinct margins seen in the middle lateral region of the right breast. Lesion may be witin the upper right breast.  Images were evaluated with a Computer Aided Detection (CAD) system.   Impression  Mass in the right breast requires additional evaluation. Spot compression is recommended. Ultrasound if necessary.     4/14/15 Mammogram and u/s:  There is an irregular mass with spiculated margins and associated calcifications seen in the right breast at 10 o'clock. Digital tomosynthesis was performed and used in the interpretation of the  Images. Images were evaluated with a Computer Aided Detectin (CAD) system.    RIGHT LIMITED ULTRASOUND FINDINGS:  On ultrasound, the area in question is hypoechoic and measures 8 millimeters. Ultrasound is suggestive of a solid mass.  Impression  Solid mass in the right breast is highly suggestive of malignancy. Biopsy should be considered.     4/30/15 CXR: No acute abnormality.    Mammogram 5/26/17: Post-surgical scar in the right breast is benign-negative. Routine follow-up  mammogram in 1 year is recommended. BI-RADS Category 2: Benign    Assessment / Plan:  Karena Young is a 77 y.o. female with hypertrophic obstructive cardiomyopathy, CKD comes in for management of breast cancer.     1. Right sided breast cancer: Estrogen positive, ME and [id number] negative. pT1b tumor s/p lumpectomy. Previously discussed the reasons for surgical staging in helping to determine whether adjuvant chemotherapy is recommended or not. Also discussed the risks and benefits of adjuvant chemotherapy. In this patient with advanced age, comorbidities and most importantly significant dyspnea on exertion, would not recommend adjuvant chemotherapy. Do feel that the risks of chemotherapy including infection outweigh the benefits in her case. Therefore, we discussed that she does not need to undergo further surgical staging as it would not change our management. As her tumor is relatively small and has hormone receptor positive / Unk7Hjy negative pattern, she has a good prognosis with adjuvant endocrine therapy for 5 yrs. She was evaluated by [facility] for adjuvant radiation therapy and she was advised against pursuing radiation.   -- Continue Arimidex. Had switched from Letrozole given arthralgias. Will plan to continue for 5-10 years  -- RTC q3 months (BMP needed in 6 months)  -- Plan to f/u every 6 months as she is 2 yrs out from diagnosis.   -- Mammogram due approx 4/2018    2. Osteopenia: Seen on DEXA 3/26/15. She was started on Calcium and Vit D. 25-OH,Vit D level slightly low at 21 in 3/2016. Given initiation of aromatase inhibitor, will check renal function and give Zometa every months, renally dosed.   -- BMP for renal function every 6 months.  -- Zometa 3.0mg IV over 15 minutes every 6 months, given 7/17/17 - due again in 1/2018.     3. Hypertrophic obstructive cardiomyopathy: s/p ETOH ablation 4/2006.   -- Rollator walker requested / ordered at past visit.    4. Atrial fibrillation: Had transient Atrial  flutter and Afib in 2009 and 2010. Also had VT and was placed on Amiodarone for that with no further Afib. Has ICD/pacemaker.     5. Health maintenance: Mammo due in 4/2017. Patient has decided to stop further pap smear screening. Never had a colonoscopy. Risk/benefit discussion regarding colonoscopy held in the past. Pt has opted against colonoscopy as well.    Katherine Pemberton M.D.  Hematology Oncology

## 2017-07-21 RX ORDER — AMIODARONE HYDROCHLORIDE 100 MG/1
TABLET ORAL
Qty: 30 TABLET | Refills: 0 | OUTPATIENT
Start: 2017-07-21

## 2017-07-26 ENCOUNTER — OFFICE VISIT (OUTPATIENT)
Dept: INTERNAL MEDICINE | Facility: CLINIC | Age: 77
End: 2017-07-26
Payer: MEDICARE

## 2017-07-26 VITALS
HEART RATE: 58 BPM | TEMPERATURE: 99 F | BODY MASS INDEX: 22.15 KG/M2 | WEIGHT: 125 LBS | SYSTOLIC BLOOD PRESSURE: 130 MMHG | DIASTOLIC BLOOD PRESSURE: 70 MMHG | HEIGHT: 63 IN

## 2017-07-26 DIAGNOSIS — R41.3 MEMORY LOSS: Primary | ICD-10-CM

## 2017-07-26 DIAGNOSIS — M79.10 MYALGIA: ICD-10-CM

## 2017-07-26 DIAGNOSIS — E78.5 DYSLIPIDEMIA: ICD-10-CM

## 2017-07-26 DIAGNOSIS — J34.89 RHINORRHEA: ICD-10-CM

## 2017-07-26 DIAGNOSIS — M25.50 ARTHRALGIA, UNSPECIFIED JOINT: ICD-10-CM

## 2017-07-26 PROCEDURE — 99214 OFFICE O/P EST MOD 30 MIN: CPT | Mod: S$GLB,,, | Performed by: FAMILY MEDICINE

## 2017-07-26 PROCEDURE — 99999 PR PBB SHADOW E&M-EST. PATIENT-LVL III: CPT | Mod: PBBFAC,,, | Performed by: FAMILY MEDICINE

## 2017-07-26 PROCEDURE — 1159F MED LIST DOCD IN RCRD: CPT | Mod: S$GLB,,, | Performed by: FAMILY MEDICINE

## 2017-07-26 PROCEDURE — 1125F AMNT PAIN NOTED PAIN PRSNT: CPT | Mod: S$GLB,,, | Performed by: FAMILY MEDICINE

## 2017-07-26 NOTE — PROGRESS NOTES
"Subjective:      Patient ID: Karena Young is a 77 y.o. female.    Chief Complaint:  F/U memory    HPI  76 yo pleasant female here with her sister today for f/u after starting on Aricept.  She is tolerating the medication fine and her sister feels things are stable.  No worsening of memory, no improvement just about the same.  They are happy with this for now.  She will be moving into a small home on her children's property soon.  Currently with her sister.  Weight down 6 lbs since last visit.  She is on Arimidex and getting IV infusion for bones.  Had infusion last week and is having terrible arthralgias and myalgias.  This has happened a few times.  Dr. Pemberton has been following her.  Has varicose veins in BL legs, no pain just asking if anything needs to be done.    Past Medical History:   Diagnosis Date    *Atrial fibrillation     *Atrial flutter     Breast cancer     Cardiomyopathy     HOCM (hypertrophic obstructive cardiomyopathy) 9/6/2013    Hyperlipidemia     Hypertension     Macular degeneration     Orthostatic hypotension     Ventricular tachycardia      Family History   Problem Relation Age of Onset    Cancer Brother 60     Past Surgical History:   Procedure Laterality Date    BREAST BIOPSY      CARDIAC ELECTROPHYSIOLOGY STUDY AND ABLATION  4/06    CATARACT EXTRACTION      ICD implantation      INSERT / REPLACE / REMOVE PACEMAKER       Social History   Substance Use Topics    Smoking status: Never Smoker    Smokeless tobacco: Never Used    Alcohol use No       /70   Pulse (!) 58   Temp 98.5 °F (36.9 °C) (Tympanic)   Ht 5' 3" (1.6 m)   Wt 56.7 kg (125 lb)   BMI 22.14 kg/m²     Review of Systems   Constitutional: Negative for chills and fever.        Weight down with stressors of moving, sister watching closely   HENT: Positive for rhinorrhea.    Respiratory: Negative.    Cardiovascular: Negative.    Gastrointestinal: Negative.    Musculoskeletal: Positive for arthralgias " and myalgias.     Objective:     Physical Exam   Constitutional: She appears well-developed and well-nourished.   HENT:   Mouth/Throat: Oropharynx is clear and moist.   Neck: Normal range of motion. Neck supple.   Cardiovascular: Normal rate and normal heart sounds.    Pulmonary/Chest: Effort normal and breath sounds normal. No respiratory distress. She has no wheezes.   Abdominal: Soft. Bowel sounds are normal.   Skin: Skin is warm and dry.   BL LE with large/non inflamed varicose veins   Nursing note and vitals reviewed.      Lab Results   Component Value Date    WBC 5.21 04/27/2017    HGB 13.6 04/27/2017    HCT 41.1 04/27/2017     04/27/2017    CHOL 169 03/17/2016    TRIG 77 03/17/2016    HDL 56 03/17/2016    ALT 14 04/27/2017    AST 22 04/27/2017     07/17/2017    K 3.7 07/17/2017    CL 98 07/17/2017    CREATININE 1.4 07/17/2017    BUN 36 (H) 07/17/2017    CO2 32 (H) 07/17/2017    TSH 3.105 04/27/2017    INR 1.0 08/14/2013       Assessment:     1. Memory loss    2. Myalgia    3. Arthralgia, unspecified joint    4. Dyslipidemia    5. Rhinorrhea       Plan:   Memory loss  -     CBC auto differential; Future; Expected date: 01/26/2018    Myalgia    Arthralgia, unspecified joint    Dyslipidemia  -     Comprehensive metabolic panel; Future; Expected date: 01/26/2018  -     Lipid panel; Future; Expected date: 01/26/2018    Rhinorrhea    Claritin or zyrtec for rhinorrhea  Compression stockings/elevation for veins.  Not painful at this time  Cont Aricept for now.  Monitor progress over next 6 mos with moving and such.  Consider adding Namenda.  Ok to use tylenol tid for pain, discuss changing med with Onc  On Zometa and SE are likely from that as it coincides with her last infusion  Monitor weight closely  F/u with labs in 6 mos

## 2017-07-28 ENCOUNTER — TELEPHONE (OUTPATIENT)
Dept: INTERNAL MEDICINE | Facility: CLINIC | Age: 77
End: 2017-07-28

## 2017-07-28 DIAGNOSIS — I48.20 CHRONIC ATRIAL FIBRILLATION: Primary | ICD-10-CM

## 2017-07-28 RX ORDER — AMIODARONE HYDROCHLORIDE 100 MG/1
100 TABLET ORAL DAILY
Qty: 30 TABLET | Refills: 11 | Status: SHIPPED | OUTPATIENT
Start: 2017-07-28 | End: 2017-10-12 | Stop reason: SDUPTHER

## 2017-07-28 NOTE — TELEPHONE ENCOUNTER
----- Message from Abel Gibbons MA sent at 7/28/2017  9:39 AM CDT -----  Justice Castillo LPN 24 minutes ago (9:13 AM)     Pt's sister states, the refill for pt's amiodarone was denied and she's not sure why. After reviewing chart, informed her that refill was sent to the wrong provider. Will send message to Dr. Hauser's office that pt needs a refill on amiodarone. She verbalized understanding.     Documentation

## 2017-07-28 NOTE — TELEPHONE ENCOUNTER
Pt's sister states, the refill for pt's amiodarone was denied and she's not sure why. After reviewing chart, informed her that refill was sent to the wrong provider. Will send message to Dr. Hauser's office that pt needs a refill on amiodarone. She verbalized understanding.

## 2017-07-28 NOTE — TELEPHONE ENCOUNTER
----- Message from Sonam Maki sent at 7/28/2017  8:33 AM CDT -----  Contact: Hui/sister  Hui called to speak with the nurse about some medications for this patient. She can be contacted at [phone] cell.  Thanks,  Sonam

## 2017-09-05 RX ORDER — HYDROCHLOROTHIAZIDE 12.5 MG/1
CAPSULE ORAL
Qty: 30 CAPSULE | Refills: 6 | Status: SHIPPED | OUTPATIENT
Start: 2017-09-05 | End: 2018-01-05

## 2017-09-14 ENCOUNTER — OFFICE VISIT (OUTPATIENT)
Dept: HEMATOLOGY/ONCOLOGY | Facility: CLINIC | Age: 77
End: 2017-09-14
Payer: MEDICARE

## 2017-09-14 VITALS
HEART RATE: 67 BPM | SYSTOLIC BLOOD PRESSURE: 100 MMHG | OXYGEN SATURATION: 99 % | DIASTOLIC BLOOD PRESSURE: 60 MMHG | TEMPERATURE: 98 F | WEIGHT: 123 LBS | BODY MASS INDEX: 21 KG/M2 | RESPIRATION RATE: 18 BRPM | HEIGHT: 64 IN

## 2017-09-14 DIAGNOSIS — Z79.811 AROMATASE INHIBITOR USE: ICD-10-CM

## 2017-09-14 DIAGNOSIS — Z17.0 MALIGNANT NEOPLASM OF UPPER-OUTER QUADRANT OF RIGHT BREAST IN FEMALE, ESTROGEN RECEPTOR POSITIVE: Primary | ICD-10-CM

## 2017-09-14 DIAGNOSIS — C50.411 MALIGNANT NEOPLASM OF UPPER-OUTER QUADRANT OF RIGHT BREAST IN FEMALE, ESTROGEN RECEPTOR POSITIVE: Primary | ICD-10-CM

## 2017-09-14 PROCEDURE — 3008F BODY MASS INDEX DOCD: CPT | Mod: S$GLB,,, | Performed by: INTERNAL MEDICINE

## 2017-09-14 PROCEDURE — 1159F MED LIST DOCD IN RCRD: CPT | Mod: S$GLB,,, | Performed by: INTERNAL MEDICINE

## 2017-09-14 PROCEDURE — 99999 PR PBB SHADOW E&M-EST. PATIENT-LVL IV: CPT | Mod: PBBFAC,,, | Performed by: INTERNAL MEDICINE

## 2017-09-14 PROCEDURE — 1125F AMNT PAIN NOTED PAIN PRSNT: CPT | Mod: S$GLB,,, | Performed by: INTERNAL MEDICINE

## 2017-09-14 PROCEDURE — 3074F SYST BP LT 130 MM HG: CPT | Mod: S$GLB,,, | Performed by: INTERNAL MEDICINE

## 2017-09-14 PROCEDURE — 99215 OFFICE O/P EST HI 40 MIN: CPT | Mod: S$GLB,,, | Performed by: INTERNAL MEDICINE

## 2017-09-14 PROCEDURE — 3078F DIAST BP <80 MM HG: CPT | Mod: S$GLB,,, | Performed by: INTERNAL MEDICINE

## 2017-09-14 RX ORDER — LETROZOLE 2.5 MG/1
2.5 TABLET, FILM COATED ORAL DAILY
Qty: 30 TABLET | Refills: 2 | Status: SHIPPED | OUTPATIENT
Start: 2017-09-14 | End: 2017-12-17 | Stop reason: SDUPTHER

## 2017-09-15 DIAGNOSIS — I48.20 CHRONIC ATRIAL FIBRILLATION: Primary | ICD-10-CM

## 2017-09-15 RX ORDER — METOPROLOL SUCCINATE 50 MG/1
50 TABLET, EXTENDED RELEASE ORAL DAILY
Qty: 30 TABLET | Refills: 11 | Status: ON HOLD | OUTPATIENT
Start: 2017-09-15 | End: 2017-11-12 | Stop reason: HOSPADM

## 2017-09-15 NOTE — PROGRESS NOTES
Hematology/Oncology Office Note  [facility] Diagnosis: Right sided breast cancer, ER+/PA neg/[id number] neg     Stage: Unknown. pT1b     Pathology: Infiltrating ductal carcinoma, 0.6cm, margins negative. ER+/PA neg/Ezg9Wgt neg.     Prior Treatment: Right breast lumpectomy on 5/5/15 by Dr. Pacheco     Current Treatment: Adjuvant endocrine therapy, started 6/2015         CC:  Breast cancer    Referred by:  No ref. provider found    History of Present Illness:  Mr. Young is a 76 y/o female with hypertrophic cardiomyopathy, ICD, and right-sided breast cancer. She had an abnormal screening mammogram in March 2015, followed by abnormal diagnostic mammogram and breast ultrasound. She underwent initial biopsy at [facility], which was positive for breast cancer. She then underwent R breast lumpectomy by Dr. Pacheco on 5/5/15. She did not undergo sentinel LN dissection due to comorbidities and feeling that she would likely not be a candidate for chemotherapy. After our intial meeting, we did discuss that due to her decreased performance status and cardiomyopathy, she was not a candidate for chemotherapy. She also met with Radiation Oncology. They are also recommended against adjuvant radiation. She is taking adjuvant Letrozole. She denies any recurrent hospitalizations for health problems aside from cardiac procedures for ICD/pacemaker implantation, ablation surgery. No recurrent infections. No chronic LE edema. No pulmonary edema. She has hypertrophic obstructive cardiomyopathy, causing diastolic dysfunction, but does not require Lasix. She does have significant SOB and HOBBS. She gets quite winded just walked on her driveway to her mailbox. She cannot walk a full block without stopping to rest.        I have reviewed and updated the HPI, ROS, PMHx, Social Hx, Family Hx and treatment history.    Today's visit:  Patient presents today with complaints of arthralgias/myalgias related to AI therapy.  She was previously on all  and switch to Arimidex due to arthralgias/myalgias.  However, the patient reports significant worsening of arthralgias after switching to Arimidex and request to switch back to letrozole.    Past Medical History:   Diagnosis Date    *Atrial fibrillation     *Atrial flutter     Breast cancer     Cardiomyopathy     HOCM (hypertrophic obstructive cardiomyopathy) 9/6/2013    Hyperlipidemia     Hypertension     Macular degeneration     Orthostatic hypotension     Ventricular tachycardia          Social History:      Family History: family history includes Cancer (age of onset: 60) in her brother.        HPI  Review of Systems   Constitutional: Positive for appetite change. Negative for activity change, fatigue, fever and unexpected weight change.   HENT: Negative for congestion, facial swelling, mouth sores, nosebleeds, sore throat, trouble swallowing and voice change.    Eyes: Negative for photophobia, pain, discharge, itching and visual disturbance.   Respiratory: Negative for apnea, cough, choking, chest tightness and shortness of breath.    Cardiovascular: Negative for chest pain, palpitations and leg swelling.   Gastrointestinal: Negative for abdominal distention, abdominal pain, anal bleeding, blood in stool, constipation, diarrhea, nausea and vomiting.   Endocrine: Negative for cold intolerance, heat intolerance, polydipsia and polyphagia.   Genitourinary: Positive for frequency. Negative for difficulty urinating, dyspareunia, dysuria, flank pain, hematuria, pelvic pain and vaginal bleeding.   Musculoskeletal: Negative for arthralgias, back pain, gait problem, joint swelling, myalgias and neck pain.   Skin: Negative for pallor, rash and wound.   Allergic/Immunologic: Negative for environmental allergies and immunocompromised state.   Neurological: Negative for dizziness, tremors, seizures, syncope, facial asymmetry, speech difficulty, weakness, light-headedness, numbness and headaches.   Hematological:  "Negative for adenopathy. Does not bruise/bleed easily.   Psychiatric/Behavioral: Negative for agitation, behavioral problems, confusion, dysphoric mood and hallucinations. The patient is not nervous/anxious and is not hyperactive.        Objective:       Vitals:    09/14/17 1419   BP: 100/60   Pulse: 67   Resp: 18   Temp: 97.5 °F (36.4 °C)   TempSrc: Oral   SpO2: 99%   Weight: 55.8 kg (123 lb 0.3 oz)   Height: 5' 4" (1.626 m)     Physical Exam   Constitutional: She is oriented to person, place, and time. She appears well-developed and well-nourished. No distress.   Well groomed   HENT:   Head: Normocephalic and atraumatic.   Right Ear: Tympanic membrane and ear canal normal.   Left Ear: Tympanic membrane and ear canal normal.   Nose: Nose normal. Right sinus exhibits no maxillary sinus tenderness and no frontal sinus tenderness. Left sinus exhibits no maxillary sinus tenderness and no frontal sinus tenderness.   Mouth/Throat: Oropharynx is clear and moist and mucous membranes are normal. No oral lesions. Normal dentition. No oropharyngeal exudate.   Eyes: Conjunctivae, EOM and lids are normal. Pupils are equal, round, and reactive to light. Lids are everted and swept, no foreign bodies found. No scleral icterus.   Neck: Trachea normal and normal range of motion. Neck supple. No JVD present. No tracheal deviation present. No thyroid mass and no thyromegaly present.   No crepitus   Cardiovascular: Normal rate, regular rhythm, normal heart sounds, intact distal pulses and normal pulses.  Exam reveals no gallop and no friction rub.    No murmur heard.  Pulmonary/Chest: Effort normal and breath sounds normal. She has no wheezes. She has no rales. She exhibits no tenderness.   Abdominal: Soft. Normal appearance and bowel sounds are normal. She exhibits no distension and no mass. There is no hepatosplenomegaly. There is no tenderness. There is no rebound and no guarding.   Musculoskeletal: Normal range of motion. She " exhibits no edema or tenderness.   Lymphadenopathy:     She has no cervical adenopathy.   Neurological: She is alert and oriented to person, place, and time. No cranial nerve deficit. She exhibits normal muscle tone. Coordination normal.   Skin: Skin is warm and dry. No rash noted. She is not diaphoretic. No cyanosis or erythema. No pallor. Nails show no clubbing.   Psychiatric: She has a normal mood and affect. Her behavior is normal. Judgment and thought content normal.       Lab Results   Component Value Date    WBC 5.21 04/27/2017    HGB 13.6 04/27/2017    HCT 41.1 04/27/2017    MCV 92 04/27/2017     04/27/2017     Lab Results   Component Value Date    CREATININE 1.4 07/17/2017    BUN 36 (H) 07/17/2017     07/17/2017    K 3.7 07/17/2017    CL 98 07/17/2017    CO2 32 (H) 07/17/2017     Lab Results   Component Value Date    ALT 14 04/27/2017    AST 22 04/27/2017    ALKPHOS 76 04/27/2017    BILITOT 0.5 04/27/2017         Assessment:       Karena Young is a 77 y.o. female with hypertrophic obstructive cardiomyopathy, CKD comes in for management of breast cancer.  She was previously followed by Dr. Hu in the hematology/oncology clinic and I am seeing the patient for the first time today in Dr. Hu's absence She has a history of Right sided breast cancer: Estrogen positive, IN and [id number] negative. pT1b tumor s/p lumpectomy. Previously discussed the reasons for surgical staging in helping to determine whether adjuvant chemotherapy is recommended or not. Also discussed the risks and benefits of adjuvant chemotherapy. In this patient with advanced age, comorbidities and most importantly significant dyspnea on exertion, would not recommend adjuvant chemotherapy. Do feel that the risks of chemotherapy including infection outweigh the benefits in her case. Therefore, we discussed that she does not need to undergo further surgical staging as it would not change our management. As her tumor is  relatively small and has hormone receptor positive / Cca1Idg negative pattern, she has a good prognosis with adjuvant endocrine therapy for 5 yrs. She was evaluated by [facility] for adjuvant radiation therapy and she was advised against pursuing radiation.   Patient presents today with worsening myalgias related to Arimidex.  She reports the symptoms were significantly better on the letrozole, therefore, we will switch the patient back to letrozole.  She will follow-up in 3 months with repeat labs and Zometa      Stage I pT1b, N0, M0 ER/LA positive, HER-2 negative infiltrating ductal carcinoma of the right breast status post lumpectomy (no sentinel lymph node biopsy due to comorbidities)  -- Stop Arimidex and start letrozole 2.5 mg daily  -- RTC q3 months   -- Mammogram due approx 4/2018     Osteopenia: Seen on DEXA 3/26/15. She was started on Calcium and Vit D. 25-OH,Vit D level slightly low at 21 in 3/2016. Given initiation of aromatase inhibitor, will check renal function and give Zometa every months, renally dosed.   -- BMP for renal function every 6 months.  -- Zometa 3.0mg IV over 15 minutes every 6 months, given 7/17/17 - due again in 1/2018.      Hypertrophic obstructive cardiomyopathy: s/p ETOH ablation 4/2006.   -- Rollator walker requested / ordered at past visit.     Atrial fibrillation: Had transient Atrial flutter and Afib in 2009 and 2010. Also had VT and was placed on Amiodarone for that with no further Afib. Has ICD/pacemaker.      Health maintenance: Mammo due in 4/2017. Patient has decided to stop further pap smear screening. Never had a colonoscopy. Risk/benefit discussion regarding colonoscopy held in the past. Pt has opted against colonoscopy as well.

## 2017-10-09 DIAGNOSIS — Z95.810 PRESENCE OF AUTOMATIC CARDIOVERTER/DEFIBRILLATOR (AICD): Primary | ICD-10-CM

## 2017-10-12 ENCOUNTER — CLINICAL SUPPORT (OUTPATIENT)
Dept: ELECTROPHYSIOLOGY | Facility: CLINIC | Age: 77
End: 2017-10-12
Payer: MEDICARE

## 2017-10-12 ENCOUNTER — OFFICE VISIT (OUTPATIENT)
Dept: ELECTROPHYSIOLOGY | Facility: CLINIC | Age: 77
End: 2017-10-12
Payer: MEDICARE

## 2017-10-12 ENCOUNTER — HOSPITAL ENCOUNTER (OUTPATIENT)
Dept: CARDIOLOGY | Facility: CLINIC | Age: 77
Discharge: HOME | End: 2017-10-12
Payer: MEDICARE

## 2017-10-12 VITALS
WEIGHT: 115.94 LBS | HEART RATE: 64 BPM | DIASTOLIC BLOOD PRESSURE: 65 MMHG | SYSTOLIC BLOOD PRESSURE: 123 MMHG | BODY MASS INDEX: 19.79 KG/M2 | HEIGHT: 64 IN

## 2017-10-12 DIAGNOSIS — Z17.0 MALIGNANT NEOPLASM OF UPPER-OUTER QUADRANT OF RIGHT BREAST IN FEMALE, ESTROGEN RECEPTOR POSITIVE: ICD-10-CM

## 2017-10-12 DIAGNOSIS — I48.92 ATRIAL FLUTTER, UNSPECIFIED TYPE: Primary | ICD-10-CM

## 2017-10-12 DIAGNOSIS — I10 UNCONTROLLED STAGE 2 HYPERTENSION: Chronic | ICD-10-CM

## 2017-10-12 DIAGNOSIS — I42.1 HOCM (HYPERTROPHIC OBSTRUCTIVE CARDIOMYOPATHY): ICD-10-CM

## 2017-10-12 DIAGNOSIS — E78.5 HYPERLIPIDEMIA WITH TARGET LDL LESS THAN 130: Chronic | ICD-10-CM

## 2017-10-12 DIAGNOSIS — I48.20 CHRONIC ATRIAL FIBRILLATION: ICD-10-CM

## 2017-10-12 DIAGNOSIS — E03.8 SUBCLINICAL HYPOTHYROIDISM: Chronic | ICD-10-CM

## 2017-10-12 DIAGNOSIS — Z95.810 IMPLANTABLE CARDIOVERTER-DEFIBRILLATOR (ICD) IN SITU: ICD-10-CM

## 2017-10-12 DIAGNOSIS — I45.10 RBBB: ICD-10-CM

## 2017-10-12 DIAGNOSIS — Z95.810 PRESENCE OF AUTOMATIC CARDIOVERTER/DEFIBRILLATOR (AICD): ICD-10-CM

## 2017-10-12 DIAGNOSIS — I47.29 PAROXYSMAL VENTRICULAR TACHYCARDIA: ICD-10-CM

## 2017-10-12 DIAGNOSIS — Z91.89 AT RISK FOR AMIODARONE TOXICITY WITH LONG TERM USE: ICD-10-CM

## 2017-10-12 DIAGNOSIS — I42.1 HYPERTROPHIC OBSTRUCTIVE CARDIOMYOPATHY: ICD-10-CM

## 2017-10-12 DIAGNOSIS — I48.0 PAF (PAROXYSMAL ATRIAL FIBRILLATION): ICD-10-CM

## 2017-10-12 DIAGNOSIS — Z95.810 AUTOMATIC IMPLANTABLE CARDIOVERTER-DEFIBRILLATOR IN SITU: ICD-10-CM

## 2017-10-12 DIAGNOSIS — C50.411 MALIGNANT NEOPLASM OF UPPER-OUTER QUADRANT OF RIGHT BREAST IN FEMALE, ESTROGEN RECEPTOR POSITIVE: ICD-10-CM

## 2017-10-12 DIAGNOSIS — Z79.899 AT RISK FOR AMIODARONE TOXICITY WITH LONG TERM USE: ICD-10-CM

## 2017-10-12 PROCEDURE — 99999 PR PBB SHADOW E&M-EST. PATIENT-LVL III: CPT | Mod: PBBFAC,,, | Performed by: INTERNAL MEDICINE

## 2017-10-12 PROCEDURE — 93000 ELECTROCARDIOGRAM COMPLETE: CPT | Mod: S$GLB,,, | Performed by: INTERNAL MEDICINE

## 2017-10-12 PROCEDURE — 93295 DEV INTERROG REMOTE 1/2/MLT: CPT | Mod: S$GLB,,, | Performed by: INTERNAL MEDICINE

## 2017-10-12 PROCEDURE — 99215 OFFICE O/P EST HI 40 MIN: CPT | Mod: S$GLB,,, | Performed by: INTERNAL MEDICINE

## 2017-10-12 PROCEDURE — 93296 REM INTERROG EVL PM/IDS: CPT | Mod: S$GLB,,, | Performed by: INTERNAL MEDICINE

## 2017-10-12 RX ORDER — AMIODARONE HYDROCHLORIDE 200 MG/1
200 TABLET ORAL DAILY
Qty: 30 TABLET | Refills: 11 | Status: SHIPPED | OUTPATIENT
Start: 2017-10-12 | End: 2018-11-08 | Stop reason: SDUPTHER

## 2017-11-07 DIAGNOSIS — I48.92 ATRIAL FLUTTER, UNSPECIFIED TYPE: Primary | ICD-10-CM

## 2017-11-07 NOTE — PROGRESS NOTES
ABLATION EDUCATION CHECKLIST    11-16-17  PRE - PROCEDURE LABS HAVE BEEN ORDERED FOR YOU @ [facility]  (YOU DO NOT HAVE TO FAST FOR THIS LABWORK!!!!)    11-21-17 arrive @ 8 AM  Report to Cardiology Waiting Room on 3rd floor of the Hospital    (Do not report to clinic)  Directions for Reporting to Cardiology Waiting Area in the Hospital  If you park in the Parking Garage:  Take elevators to the 2nd floor  Walk up ramp and turn right by Gold Elevators  Take elevator to the 3rd floor  Upon exiting the elevator, turn away from the clinic areas  Walk long sandoval around to front of hospital to area with windows overlooking [facility]  Check in at Reception Desk  OR  If family is dropping you off:  Have them drop you off at the front of the Hospital  (Near the ER, where all the flags are hung).  Take the E elevators to the 3rd floor.  Check in at the Reception Desk in the waiting room.    Do not eat or drink anything after: 12 midnight on the night before your procedure    Medications:   HOLD your hydrochlorothiazide (Microzide) on the morning of your procedure.    You may take your other usual morning medications with a sip of water.    **Please contact the office with any medication changes from other providers**    You will be spending the night after your procedure  You will need someone to drive you home the day after your procedure.    Your pain during your procedure will be managed by the anesthesia team.     THE ABOVE INSTRUCTIONS WERE GIVEN TO THE PATIENT VERBALLY AND THEY VERBALIZED UNDERSTANDING.  THEY DO NOT REQUIRE ANY SPECIAL NEEDS AND DO NOT HAVE ANY LEARNING BARRIERS.    Any need to reschedule or cancel procedures, or any questions regarding your procedures should be addressed directly with the Arrhythmia Department Nurses at the following phone number: [phone]

## 2017-11-11 ENCOUNTER — OFFICE VISIT (OUTPATIENT)
Dept: URGENT CARE | Facility: CLINIC | Age: 77
End: 2017-11-11
Payer: MEDICARE

## 2017-11-11 ENCOUNTER — HOSPITAL ENCOUNTER (OUTPATIENT)
Facility: HOSPITAL | Age: 77
Discharge: HOME | End: 2017-11-12
Attending: INTERNAL MEDICINE | Admitting: INTERNAL MEDICINE
Payer: MEDICARE

## 2017-11-11 VITALS
TEMPERATURE: 97 F | HEART RATE: 137 BPM | DIASTOLIC BLOOD PRESSURE: 58 MMHG | HEIGHT: 64 IN | SYSTOLIC BLOOD PRESSURE: 119 MMHG | WEIGHT: 123.44 LBS | BODY MASS INDEX: 21.07 KG/M2 | OXYGEN SATURATION: 98 %

## 2017-11-11 DIAGNOSIS — R06.02 SOB (SHORTNESS OF BREATH): ICD-10-CM

## 2017-11-11 DIAGNOSIS — R00.0 TACHYCARDIA WITH HEART RATE 121-140 BEATS PER MINUTE: Primary | ICD-10-CM

## 2017-11-11 DIAGNOSIS — I95.89 OTHER SPECIFIED HYPOTENSION: ICD-10-CM

## 2017-11-11 DIAGNOSIS — I48.3 TYPICAL ATRIAL FLUTTER: Primary | ICD-10-CM

## 2017-11-11 DIAGNOSIS — I48.92 ATRIAL FIBRILLATION AND FLUTTER: ICD-10-CM

## 2017-11-11 DIAGNOSIS — I48.91 ATRIAL FIBRILLATION AND FLUTTER: ICD-10-CM

## 2017-11-11 PROBLEM — N17.9 ACUTE KIDNEY FAILURE: Status: ACTIVE | Noted: 2017-11-11

## 2017-11-11 LAB
ANION GAP SERPL CALC-SCNC: 14 MMOL/L
BASOPHILS # BLD AUTO: 0.01 K/UL
BASOPHILS NFR BLD: 0.1 %
BILIRUB UR QL STRIP: NEGATIVE
BNP SERPL-MCNC: 302 PG/ML
BUN SERPL-MCNC: 35 MG/DL
CALCIUM SERPL-MCNC: 10.1 MG/DL
CHLORIDE SERPL-SCNC: 97 MMOL/L
CLARITY UR: CLEAR
CO2 SERPL-SCNC: 26 MMOL/L
COLOR UR: YELLOW
CREAT SERPL-MCNC: 1.7 MG/DL
DIFFERENTIAL METHOD: ABNORMAL
EOSINOPHIL # BLD AUTO: 0 K/UL
EOSINOPHIL NFR BLD: 0.1 %
ERYTHROCYTE [DISTWIDTH] IN BLOOD BY AUTOMATED COUNT: 12.8 %
EST. GFR  (AFRICAN AMERICAN): 33 ML/MIN/1.73 M^2
EST. GFR  (NON AFRICAN AMERICAN): 29 ML/MIN/1.73 M^2
GLUCOSE SERPL-MCNC: 107 MG/DL
GLUCOSE UR QL STRIP: NEGATIVE
HCT VFR BLD AUTO: 41.1 %
HGB BLD-MCNC: 14 G/DL
HGB UR QL STRIP: NEGATIVE
KETONES UR QL STRIP: NEGATIVE
LEUKOCYTE ESTERASE UR QL STRIP: NEGATIVE
LYMPHOCYTES # BLD AUTO: 0.7 K/UL
LYMPHOCYTES NFR BLD: 9.6 %
MCH RBC QN AUTO: 31.2 PG
MCHC RBC AUTO-ENTMCNC: 34.1 G/DL
MCV RBC AUTO: 92 FL
MONOCYTES # BLD AUTO: 0.8 K/UL
MONOCYTES NFR BLD: 9.9 %
NEUTROPHILS # BLD AUTO: 6.1 K/UL
NEUTROPHILS NFR BLD: 80.3 %
NITRITE UR QL STRIP: NEGATIVE
PH UR STRIP: 6 [PH] (ref 5–8)
PLATELET # BLD AUTO: 214 K/UL
PMV BLD AUTO: 10.2 FL
POTASSIUM SERPL-SCNC: 3.9 MMOL/L
PROT UR QL STRIP: NEGATIVE
RBC # BLD AUTO: 4.49 M/UL
SODIUM SERPL-SCNC: 137 MMOL/L
SP GR UR STRIP: 1.01 (ref 1–1.03)
TROPONIN I SERPL DL<=0.01 NG/ML-MCNC: 0.02 NG/ML
TSH SERPL DL<=0.005 MIU/L-ACNC: 2.85 UIU/ML
URN SPEC COLLECT METH UR: NORMAL
UROBILINOGEN UR STRIP-ACNC: NEGATIVE EU/DL
WBC # BLD AUTO: 7.58 K/UL

## 2017-11-11 PROCEDURE — 84484 ASSAY OF TROPONIN QUANT: CPT

## 2017-11-11 PROCEDURE — G0378 HOSPITAL OBSERVATION PER HR: HCPCS

## 2017-11-11 PROCEDURE — 84443 ASSAY THYROID STIM HORMONE: CPT

## 2017-11-11 PROCEDURE — 63600175 PHARM REV CODE 636 W HCPCS: Performed by: INTERNAL MEDICINE

## 2017-11-11 PROCEDURE — 81003 URINALYSIS AUTO W/O SCOPE: CPT

## 2017-11-11 PROCEDURE — 85025 COMPLETE CBC W/AUTO DIFF WBC: CPT

## 2017-11-11 PROCEDURE — 99284 EMERGENCY DEPT VISIT MOD MDM: CPT | Mod: 25

## 2017-11-11 PROCEDURE — 25000003 PHARM REV CODE 250: Performed by: INTERNAL MEDICINE

## 2017-11-11 PROCEDURE — 93010 ELECTROCARDIOGRAM REPORT: CPT | Mod: 76,,, | Performed by: INTERNAL MEDICINE

## 2017-11-11 PROCEDURE — 83880 ASSAY OF NATRIURETIC PEPTIDE: CPT

## 2017-11-11 PROCEDURE — 99213 OFFICE O/P EST LOW 20 MIN: CPT | Mod: S$GLB,,, | Performed by: FAMILY MEDICINE

## 2017-11-11 PROCEDURE — 80048 BASIC METABOLIC PNL TOTAL CA: CPT

## 2017-11-11 PROCEDURE — 99999 PR PBB SHADOW E&M-EST. PATIENT-LVL IV: CPT | Mod: PBBFAC,,,

## 2017-11-11 PROCEDURE — 93005 ELECTROCARDIOGRAM TRACING: CPT

## 2017-11-11 PROCEDURE — 25000003 PHARM REV CODE 250: Performed by: NURSE PRACTITIONER

## 2017-11-11 RX ORDER — LETROZOLE 2.5 MG/1
2.5 TABLET, FILM COATED ORAL DAILY
Status: DISCONTINUED | OUTPATIENT
Start: 2017-11-12 | End: 2017-11-12 | Stop reason: HOSPADM

## 2017-11-11 RX ORDER — ENOXAPARIN SODIUM 100 MG/ML
40 INJECTION SUBCUTANEOUS
Status: COMPLETED | OUTPATIENT
Start: 2017-11-11 | End: 2017-11-11

## 2017-11-11 RX ORDER — DONEPEZIL HYDROCHLORIDE 5 MG/1
10 TABLET, FILM COATED ORAL NIGHTLY
Status: DISCONTINUED | OUTPATIENT
Start: 2017-11-11 | End: 2017-11-12 | Stop reason: HOSPADM

## 2017-11-11 RX ORDER — AMIODARONE HYDROCHLORIDE 200 MG/1
200 TABLET ORAL DAILY
Status: DISCONTINUED | OUTPATIENT
Start: 2017-11-12 | End: 2017-11-12 | Stop reason: HOSPADM

## 2017-11-11 RX ORDER — NAPROXEN SODIUM 220 MG/1
81 TABLET, FILM COATED ORAL DAILY
Status: DISCONTINUED | OUTPATIENT
Start: 2017-11-12 | End: 2017-11-12 | Stop reason: HOSPADM

## 2017-11-11 RX ORDER — DILTIAZEM HYDROCHLORIDE 30 MG/1
30 TABLET, FILM COATED ORAL
Status: COMPLETED | OUTPATIENT
Start: 2017-11-11 | End: 2017-11-11

## 2017-11-11 RX ORDER — METOPROLOL SUCCINATE 50 MG/1
50 TABLET, EXTENDED RELEASE ORAL DAILY
Status: DISCONTINUED | OUTPATIENT
Start: 2017-11-12 | End: 2017-11-12

## 2017-11-11 RX ORDER — ATORVASTATIN CALCIUM 10 MG/1
20 TABLET, FILM COATED ORAL DAILY
Status: DISCONTINUED | OUTPATIENT
Start: 2017-11-12 | End: 2017-11-12 | Stop reason: HOSPADM

## 2017-11-11 RX ADMIN — ENOXAPARIN SODIUM 40 MG: 100 INJECTION SUBCUTANEOUS at 05:11

## 2017-11-11 RX ADMIN — DONEPEZIL HYDROCHLORIDE 10 MG: 5 TABLET, FILM COATED ORAL at 09:11

## 2017-11-11 RX ADMIN — DILTIAZEM HYDROCHLORIDE 30 MG: 30 TABLET, FILM COATED ORAL at 02:11

## 2017-11-11 NOTE — HPI
Karena Young is a 77 y.o. female patient with PMHx HTN, Breast Cancer, A-fib, Cardiomyopathy,AICD, and Hyperlipidemia who presents to the Emergency Department for SOB which onset gradually this morning. Pt states that she was seen at a clinic today and told to come to ED for further evaluation.  Pt has a hx of Afib, Aflutter, and ablation.Patient has an ablation scheduled in 2 weeks. Associated sxs include weakness and fatigue. Patient denies any fever, chills, CP, abd pain, n/v/d, dizziness, syncope, and all other sxs at this time. ED evaluation  BUN 35, Creatinine 1.7, , CXR shows New 1 cm pulmonary nodule right upper lobe. Patient admitted to observation.

## 2017-11-11 NOTE — PROGRESS NOTES
"Subjective:       Patient ID: Karena Young is a 77 y.o. female.    Chief Complaint: Fatigue    Patient is presenting with her family, stating that " she woke up this morning feeling weak and had severe SOB". Patient has a  h/o defibrillator and has an ablation scheduled in 2 weeks. No N/V, chest pain.       Review of Systems   Constitutional: Positive for activity change and fatigue.   HENT: Negative.    Respiratory: Negative for chest tightness.    Cardiovascular: Negative.    Gastrointestinal: Negative for nausea.   Skin: Negative.    Neurological: Positive for dizziness, weakness and light-headedness. Negative for tremors, syncope and numbness.       Objective:      BP (!) 119/58   Pulse (!) 137   Temp 97.4 °F (36.3 °C) (Tympanic)   Ht 5' 4" (1.626 m)   Wt 56 kg (123 lb 7.3 oz)   SpO2 98%   BMI 21.19 kg/m²   Physical Exam   HENT:   Head: Normocephalic.   Eyes: Pupils are equal, round, and reactive to light.   Neck: Normal range of motion.   Cardiovascular: Exam reveals no friction rub.    No murmur heard.   O2 sat 98%, A-flutter.   Pulmonary/Chest: Effort normal and breath sounds normal. No respiratory distress. She has no wheezes. She has no rales. She exhibits no tenderness.   Abdominal: Soft. Bowel sounds are normal. She exhibits no distension. There is no tenderness.   Musculoskeletal: Normal range of motion.   Neurological: She is alert. She displays normal reflexes. No cranial nerve deficit. Coordination normal.   Skin: Skin is warm and dry. Capillary refill takes less than 2 seconds. She is not diaphoretic.   Psychiatric: She has a normal mood and affect. Her behavior is normal.       Assessment:       1. Tachycardia with heart rate 121-140 beats per minute        Plan:       Tachycardia with heart rate 121-140 beats per minute    B/P repeated; 98/68 and 88/53. HR remained 137-140. O2 sat stable.  Patient was sent to ED for additional evaluation  "

## 2017-11-11 NOTE — ED PROVIDER NOTES
SCRIBE #1 NOTE: I, Ceci Brooke, am scribing for, and in the presence of, Erin Alonso MD. I have scribed the entire note.      History      Chief Complaint   Patient presents with    Shortness of Breath     was seen at [facility] in [place] this morning and was sent here       Review of patient's allergies indicates:  No Known Allergies     HPI   HPI    11/11/2017, 1:48 PM   History obtained from the patient      History of Present Illness: Karena Young is a 77 y.o. female patient who presents to the Emergency Department for SOB which onset gradually this morning. Pt has a hx of Afib, Aflutter, and ablation. Pt reports having a pacemaker. Pt states that she was seen at a clinic today and told to come to ED. Symptoms are constant and moderate in severity.  No mitigating or exacerbating factors reported. Associated sxs include weakness and fatigue. Patient denies any fever, chills, CP, SOB, abd pain, n/v/d, dizziness, syncope, and all other sxs at this time. No prior Tx reported. No further complaints or concerns at this time.         Arrival mode: Personal vehicle      PCP: Lee Dwyer MD       Past Medical History:  Past Medical History:   Diagnosis Date    *Atrial fibrillation     *Atrial flutter     Breast cancer     Cardiomyopathy     HOCM (hypertrophic obstructive cardiomyopathy) 9/6/2013    Hyperlipidemia     Hypertension     Macular degeneration     Orthostatic hypotension     Ventricular tachycardia        Past Surgical History:  Past Surgical History:   Procedure Laterality Date    BREAST BIOPSY      CARDIAC ELECTROPHYSIOLOGY STUDY AND ABLATION  4/06    CATARACT EXTRACTION      ICD implantation      INSERT / REPLACE / REMOVE PACEMAKER           Family History:  Family History   Problem Relation Age of Onset    Cancer Mother     Heart disease Mother     Heart disease Father     Cancer Brother 60       Social History:  Social History     Social History Main Topics     Smoking status: Never Smoker    Smokeless tobacco: Never Used    Alcohol use No    Drug use: No    Sexual activity: Not Currently     Partners: Male       ROS   Review of Systems   Constitutional: Positive for fatigue. Negative for chills and fever.   HENT: Negative for sore throat.    Respiratory: Positive for shortness of breath. Negative for cough.    Cardiovascular: Negative for chest pain.   Gastrointestinal: Negative for abdominal pain, diarrhea, nausea and vomiting.   Genitourinary: Negative for dysuria.   Musculoskeletal: Negative for back pain.   Skin: Negative for rash.   Neurological: Positive for weakness. Negative for dizziness, syncope and numbness.   Hematological: Does not bruise/bleed easily.       Physical Exam      Initial Vitals [11/11/17 1326]   BP Pulse Resp Temp SpO2   91/63 (!) 133 16 97.5 °F (36.4 °C) 95 %      MAP       72.33          Physical Exam  Nursing Notes and Vital Signs Reviewed.  Constitutional: Patient is in no apparent distress. Well-developed and well-nourished.  Head: Atraumatic. Normocephalic.  Eyes: PERRL. EOM intact. Conjunctivae are not pale. No scleral icterus.  ENT: Mucous membranes are moist. Oropharynx is clear and symmetric.    Neck: Supple. Full ROM. No lymphadenopathy.  Cardiovascular: Tachycardia. No murmurs, rubs, or gallops. Distal pulses are 2+ and symmetric.  Pulmonary/Chest: No respiratory distress. Clear to auscultation bilaterally. No wheezing, rales, or rhonchi.  Abdominal: Soft and non-distended.  There is no tenderness.  No rebound, guarding, or rigidity. Good bowel sounds.  Musculoskeletal: Moves all extremities. No obvious deformities. No edema. No calf tenderness.  Skin: Warm and dry.  Neurological:  Alert, awake, and appropriate.  Normal speech.  No acute focal neurological deficits are appreciated.  Psychiatric: Normal affect. Good eye contact. Appropriate in content.    ED Course    Procedures  ED Vital Signs:  Vitals:    11/11/17 1326 11/11/17  "1401 11/11/17 1431 11/11/17 1446   BP: 91/63 105/71 97/71 108/81   Pulse: (!) 133 (!) 114 108 103   Resp: 16 15 17 16   Temp: 97.5 °F (36.4 °C)      TempSrc:       SpO2: 95% 99% 97% 97%   Weight: 55.8 kg (123 lb 0.3 oz)      Height: 5' 4" (1.626 m)       11/11/17 1531 11/11/17 1646 11/11/17 1710 11/11/17 1928   BP: 106/78 112/70 116/80 131/72   Pulse: 101 104 105 (!) 116   Resp: 20 20 18 18   Temp:   97.9 °F (36.6 °C) 97.2 °F (36.2 °C)   TempSrc:   Oral Oral   SpO2: 97% 98% 97% 98%   Weight:       Height:        11/11/17 2357 11/12/17 0404   BP: 120/74 110/79   Pulse: 89 102   Resp: 20 16   Temp: 97.9 °F (36.6 °C) 97.7 °F (36.5 °C)   TempSrc: Oral Oral   SpO2: 97% 95%   Weight:     Height:         Abnormal Lab Results:  Labs Reviewed   CBC W/ AUTO DIFFERENTIAL - Abnormal; Notable for the following:        Result Value    MCH 31.2 (*)     Lymph # 0.7 (*)     Gran% 80.3 (*)     Lymph% 9.6 (*)     All other components within normal limits   B-TYPE NATRIURETIC PEPTIDE - Abnormal; Notable for the following:      (*)     All other components within normal limits   BASIC METABOLIC PANEL - Abnormal; Notable for the following:     BUN, Bld 35 (*)     Creatinine 1.7 (*)     eGFR if  33 (*)     eGFR if non  29 (*)     All other components within normal limits   URINALYSIS   TSH   TROPONIN I        All Lab Results:  Results for orders placed or performed during the hospital encounter of 11/11/17   CBC auto differential   Result Value Ref Range    WBC 7.58 3.90 - 12.70 K/uL    RBC 4.49 4.00 - 5.40 M/uL    Hemoglobin 14.0 12.0 - 16.0 g/dL    Hematocrit 41.1 37.0 - 48.5 %    MCV 92 82 - 98 fL    MCH 31.2 (H) 27.0 - 31.0 pg    MCHC 34.1 32.0 - 36.0 g/dL    RDW 12.8 11.5 - 14.5 %    Platelets 214 150 - 350 K/uL    MPV 10.2 9.2 - 12.9 fL    Gran # 6.1 1.8 - 7.7 K/uL    Lymph # 0.7 (L) 1.0 - 4.8 K/uL    Mono # 0.8 0.3 - 1.0 K/uL    Eos # 0.0 0.0 - 0.5 K/uL    Baso # 0.01 0.00 - 0.20 K/uL    Gran% " 80.3 (H) 38.0 - 73.0 %    Lymph% 9.6 (L) 18.0 - 48.0 %    Mono% 9.9 4.0 - 15.0 %    Eosinophil% 0.1 0.0 - 8.0 %    Basophil% 0.1 0.0 - 1.9 %    Differential Method Automated    Brain natriuretic peptide   Result Value Ref Range     (H) 0 - 99 pg/mL   Basic metabolic panel   Result Value Ref Range    Sodium 137 136 - 145 mmol/L    Potassium 3.9 3.5 - 5.1 mmol/L    Chloride 97 95 - 110 mmol/L    CO2 26 23 - 29 mmol/L    Glucose 107 70 - 110 mg/dL    BUN, Bld 35 (H) 8 - 23 mg/dL    Creatinine 1.7 (H) 0.5 - 1.4 mg/dL    Calcium 10.1 8.7 - 10.5 mg/dL    Anion Gap 14 8 - 16 mmol/L    eGFR if African American 33 (A) >60 mL/min/1.73 m^2    eGFR if non African American 29 (A) >60 mL/min/1.73 m^2   Urinalysis   Result Value Ref Range    Specimen UA Urine, Clean Catch     Color, UA Yellow Yellow, Straw, Dianne    Appearance, UA Clear Clear    pH, UA 6.0 5.0 - 8.0    Specific Gravity, UA 1.010 1.005 - 1.030    Protein, UA Negative Negative    Glucose, UA Negative Negative    Ketones, UA Negative Negative    Bilirubin (UA) Negative Negative    Occult Blood UA Negative Negative    Nitrite, UA Negative Negative    Urobilinogen, UA Negative <2.0 EU/dL    Leukocytes, UA Negative Negative   TSH   Result Value Ref Range    TSH 2.845 0.400 - 4.000 uIU/mL   Troponin I   Result Value Ref Range    Troponin I 0.020 0.000 - 0.026 ng/mL   CBC auto differential   Result Value Ref Range    WBC 4.67 3.90 - 12.70 K/uL    RBC 4.28 4.00 - 5.40 M/uL    Hemoglobin 13.2 12.0 - 16.0 g/dL    Hematocrit 38.9 37.0 - 48.5 %    MCV 91 82 - 98 fL    MCH 30.8 27.0 - 31.0 pg    MCHC 33.9 32.0 - 36.0 g/dL    RDW 13.0 11.5 - 14.5 %    Platelets 183 150 - 350 K/uL    MPV 10.3 9.2 - 12.9 fL    Gran # 3.1 1.8 - 7.7 K/uL    Lymph # 0.7 (L) 1.0 - 4.8 K/uL    Mono # 0.8 0.3 - 1.0 K/uL    Eos # 0.1 0.0 - 0.5 K/uL    Baso # 0.02 0.00 - 0.20 K/uL    Gran% 66.7 38.0 - 73.0 %    Lymph% 14.1 (L) 18.0 - 48.0 %    Mono% 16.9 (H) 4.0 - 15.0 %    Eosinophil% 1.9 0.0 -  8.0 %    Basophil% 0.4 0.0 - 1.9 %    Differential Method Automated    Basic metabolic panel   Result Value Ref Range    Sodium 139 136 - 145 mmol/L    Potassium 3.7 3.5 - 5.1 mmol/L    Chloride 101 95 - 110 mmol/L    CO2 28 23 - 29 mmol/L    Glucose 95 70 - 110 mg/dL    BUN, Bld 29 (H) 8 - 23 mg/dL    Creatinine 1.2 0.5 - 1.4 mg/dL    Calcium 9.6 8.7 - 10.5 mg/dL    Anion Gap 10 8 - 16 mmol/L    eGFR if African American 50 (A) >60 mL/min/1.73 m^2    eGFR if non African American 44 (A) >60 mL/min/1.73 m^2   Magnesium   Result Value Ref Range    Magnesium 2.2 1.6 - 2.6 mg/dL         Imaging Results:  Imaging Results          X-Ray Chest 1 View (Final result)  Result time 11/11/17 14:14:42    Final result by Ashley Monique MD (11/11/17 14:14:42)                 Impression:     New 1 cm pulmonary nodule right upper lobe.          Electronically signed by: ASHLEY MONIQUE  Date:     11/11/17  Time:    14:14              Narrative:    Chest x-ray single view    Indication: Shortness of breath.    Findings: Comparison study 4/30/2015.  There is a 1 cm nodular opacity right upper lobe projected between the 1st and 2nd anterior ribs, new since the prior study.  Otherwise, clear lungs.  Normal size heart.  No congestion.  Left chest wall AICD with intact leads.  No pneumothorax or pleural fluid collection.  Chronic right rotator cuff tear with narrowed acromiohumeral interval.                                  The EKG was ordered, reviewed, and independently interpreted by the ED provider.  Interpretation time: 1333  Rate: 129 BPM  Rhythm: Aflutter with RVR  Interpretation: RBBB. No STEMI.      The EKG was ordered, reviewed, and independently interpreted by the ED provider.  Interpretation time: 1421  Rate: 106 BPM  Rhythm: atrial fibrillation with RVR  Interpretation: No STEMI.      The Emergency Provider reviewed the vital signs and test results, which are outlined above.    ED Discussion         2:10 PM: Dr. Gavin MD  discussed the pt's case with Dr. Villatoro (cardiology) who recommends admission to ICU.    2:12 PM: Dr. Gavin MD discussed the pt's case with Dr. Carlson (electrophysiologist) who recommends shocking pt and d/c pt to home.    2:17 PM: Re-evaluated pt. I have discussed test results and shared treatment plan. Pt converted on her own.   I have discussed test results, shared treatment plan, and the need for admission with patient and family at bedside. Pt and family express understanding at this time and agree with all information. All questions answered. Pt and family have no further questions or concerns at this time. Pt is ready for admit.     3:38 PM: Discussed case with Tanya Almaraz NP (Hospital Medicine). Dr. Maciej Stanton MD agrees with current care and management of pt and accepts admission.   Admitting Service: Hospital medicine   Admitting Physician: Maciej Stanton MD  Admit to: tele                ED Medication(s):  Medications   amiodarone tablet 200 mg (not administered)   metoprolol succinate (TOPROL-XL) 24 hr tablet 50 mg (not administered)   atorvastatin tablet 20 mg (not administered)   donepezil tablet 10 mg (10 mg Oral Given 11/11/17 2116)   aspirin chewable tablet 81 mg (not administered)   letrozole tablet 2.5 mg (not administered)   influenza (FLUZONE HIGH-DOSE) vaccine 0.5 mL (not administered)   diltiaZEM tablet 30 mg (30 mg Oral Given 11/11/17 1414)   enoxaparin injection 40 mg (40 mg Subcutaneous Given 11/11/17 1751)       Current Discharge Medication List                Medical Decision Making    Medical Decision Making:   Clinical Tests:   Lab Tests: Ordered and Reviewed  Radiological Study: Ordered and Reviewed  Medical Tests: Ordered and Reviewed           Scribe Attestation:   Scribe #1: I performed the above scribed service and the documentation accurately describes the services I performed. I attest to the accuracy of the note.    Attending:   Physician Attestation Statement  for Scribe #1: I, Erin Alonso MD, personally performed the services described in this documentation, as scribed by Ceci Brooke, in my presence, and it is both accurate and complete.          Clinical Impression       ICD-10-CM ICD-9-CM   1. Typical atrial flutter I48.3 427.32   2. SOB (shortness of breath) R06.02 786.05   3. Atrial fibrillation and flutter I48.91 427.31    I48.92 427.32   4. Other specified hypotension I95.89 458.8       Disposition:   Disposition: Admitted  Condition: Fair         Erin Alonso MD  11/12/17 0801

## 2017-11-11 NOTE — ASSESSMENT & PLAN NOTE
EKG:Atrial fibrillation with rapid ventricular response with premature ventricular or aberrantly conducted complexes. Right bundle branch block, rate 129.  Telemetry monitoring   ASA   Amiodarone and Metoprolol   Cardiology consult   Heart Rate low 100's

## 2017-11-11 NOTE — SUBJECTIVE & OBJECTIVE
Past Medical History:   Diagnosis Date    *Atrial fibrillation     *Atrial flutter     Breast cancer     Cardiomyopathy     HOCM (hypertrophic obstructive cardiomyopathy) 9/6/2013    Hyperlipidemia     Hypertension     Macular degeneration     Orthostatic hypotension     Ventricular tachycardia        Past Surgical History:   Procedure Laterality Date    BREAST BIOPSY      CARDIAC ELECTROPHYSIOLOGY STUDY AND ABLATION  4/06    CATARACT EXTRACTION      ICD implantation      INSERT / REPLACE / REMOVE PACEMAKER         Review of patient's allergies indicates:  No Known Allergies    No current facility-administered medications on file prior to encounter.      Current Outpatient Prescriptions on File Prior to Encounter   Medication Sig    amiodarone (PACERONE) 200 MG Tab Take 1 tablet (200 mg total) by mouth once daily.    aspirin 81 mg Tab Take 1 tablet by mouth Daily.     atenolol (TENORMIN) 50 MG tablet Take 1 tablet (50 mg total) by mouth every evening.    atorvastatin (LIPITOR) 20 MG tablet TAKE 1 TABLET BY MOUTH EVERY DAY    calcium citrate-vitamin D3 315-200 mg (CITRACAL+D) 315-200 mg-unit per tablet Take 1 tablet by mouth 2 (two) times daily.      cholecalciferol, vitamin D3, 1,000 unit capsule Take 1 capsule (1,000 Units total) by mouth once daily.    donepezil (ARICEPT) 10 MG tablet Take 1 tablet (10 mg total) by mouth every evening.    hydrochlorothiazide (MICROZIDE) 12.5 mg capsule TAKE 1 CAPSULE(12.5 MG) BY MOUTH EVERY DAY    letrozole (FEMARA) 2.5 mg Tab Take 1 tablet (2.5 mg total) by mouth once daily.    lisinopril 10 MG tablet Take 0.5 tablets (5 mg total) by mouth once daily. 1 tablet Oral Every day    metoprolol succinate (TOPROL-XL) 50 MG 24 hr tablet Take 1 tablet (50 mg total) by mouth once daily.    walker (ULTRA-LIGHT ROLLATOR) Misc 1 each by Misc.(Non-Drug; Combo Route) route daily as needed.     Family History     Problem Relation (Age of Onset)    Cancer Mother,  Brother (60)    Heart disease Mother, Father        Social History Main Topics    Smoking status: Never Smoker    Smokeless tobacco: Never Used    Alcohol use No    Drug use: No    Sexual activity: Not Currently     Partners: Male     Review of Systems   Constitutional: Positive for fatigue. Negative for activity change, appetite change, chills and fever.   HENT: Negative.    Eyes: Negative.    Respiratory: Positive for shortness of breath. Negative for cough and chest tightness.    Cardiovascular: Negative.  Negative for chest pain, palpitations and leg swelling.   Gastrointestinal: Negative.  Negative for abdominal pain, diarrhea, nausea and vomiting.   Endocrine: Negative.    Genitourinary: Negative.  Negative for dysuria, flank pain, frequency and urgency.   Musculoskeletal: Negative.    Skin: Negative.    Allergic/Immunologic: Negative.    Neurological: Positive for weakness. Negative for dizziness, syncope, numbness and headaches.   Hematological: Negative.    Psychiatric/Behavioral: Negative.      Objective:     Vital Signs (Most Recent):  Temp: 97.9 °F (36.6 °C) (11/11/17 1710)  Pulse: 105 (11/11/17 1710)  Resp: 18 (11/11/17 1710)  BP: 116/80 (11/11/17 1710)  SpO2: 97 % (11/11/17 1710) Vital Signs (24h Range):  Temp:  [97.4 °F (36.3 °C)-97.9 °F (36.6 °C)] 97.9 °F (36.6 °C)  Pulse:  [101-137] 105  Resp:  [15-20] 18  SpO2:  [95 %-99 %] 97 %  BP: ()/(58-81) 116/80     Weight: 55.8 kg (123 lb 0.3 oz)  Body mass index is 21.12 kg/m².    Physical Exam   Constitutional: She is oriented to person, place, and time. She appears well-developed and well-nourished.   HENT:   Head: Normocephalic and atraumatic.   Eyes: Conjunctivae and EOM are normal. Pupils are equal, round, and reactive to light.   Neck: Normal range of motion. Neck supple.   Cardiovascular: Normal rate, normal heart sounds and intact distal pulses.  An irregularly irregular rhythm present.   Pulmonary/Chest: Effort normal and breath sounds  normal.   Abdominal: Soft. Bowel sounds are normal.   Musculoskeletal: Normal range of motion.   Neurological: She is alert and oriented to person, place, and time. She has normal reflexes.   Skin: Skin is warm and dry.   Psychiatric: She has a normal mood and affect. Her behavior is normal. Judgment and thought content normal.   Nursing note and vitals reviewed.       Significant Labs:   BMP:   Recent Labs  Lab 11/11/17  1357         K 3.9   CL 97   CO2 26   BUN 35*   CREATININE 1.7*   CALCIUM 10.1     CBC:   Recent Labs  Lab 11/11/17  1357   WBC 7.58   HGB 14.0   HCT 41.1        CMP:   Recent Labs  Lab 11/11/17  1357      K 3.9   CL 97   CO2 26      BUN 35*   CREATININE 1.7*   CALCIUM 10.1   ANIONGAP 14   EGFRNONAA 29*     Troponin:   Recent Labs  Lab 11/11/17  1357   TROPONINI 0.020     All pertinent labs within the past 24 hours have been reviewed.    Significant Imaging:  Imaging Results          X-Ray Chest 1 View (Final result)  Result time 11/11/17 14:14:42    Final result by Ashley Monique MD (11/11/17 14:14:42)                 Impression:     New 1 cm pulmonary nodule right upper lobe.          Electronically signed by: ASHLEY MONIQUE  Date:     11/11/17  Time:    14:14              Narrative:    Chest x-ray single view    Indication: Shortness of breath.    Findings: Comparison study 4/30/2015.  There is a 1 cm nodular opacity right upper lobe projected between the 1st and 2nd anterior ribs, new since the prior study.  Otherwise, clear lungs.  Normal size heart.  No congestion.  Left chest wall AICD with intact leads.  No pneumothorax or pleural fluid collection.  Chronic right rotator cuff tear with narrowed acromiohumeral interval.

## 2017-11-11 NOTE — ASSESSMENT & PLAN NOTE
BP meds held to to hypotension, will resume when appropriate   BB and Amiodarone continued   Will continue to monitor

## 2017-11-12 VITALS
WEIGHT: 123 LBS | HEART RATE: 105 BPM | DIASTOLIC BLOOD PRESSURE: 60 MMHG | TEMPERATURE: 98 F | SYSTOLIC BLOOD PRESSURE: 102 MMHG | BODY MASS INDEX: 21 KG/M2 | OXYGEN SATURATION: 97 % | RESPIRATION RATE: 16 BRPM | HEIGHT: 64 IN

## 2017-11-12 PROBLEM — N17.9 ACUTE KIDNEY FAILURE: Status: RESOLVED | Noted: 2017-11-12 | Resolved: 2017-11-12

## 2017-11-12 PROBLEM — N17.9 ACUTE KIDNEY FAILURE: Status: ACTIVE | Noted: 2017-11-12

## 2017-11-12 LAB
ANION GAP SERPL CALC-SCNC: 10 MMOL/L
BASOPHILS # BLD AUTO: 0.02 K/UL
BASOPHILS NFR BLD: 0.4 %
BUN SERPL-MCNC: 29 MG/DL
CALCIUM SERPL-MCNC: 9.6 MG/DL
CHLORIDE SERPL-SCNC: 101 MMOL/L
CO2 SERPL-SCNC: 28 MMOL/L
CREAT SERPL-MCNC: 1.2 MG/DL
DIFFERENTIAL METHOD: ABNORMAL
EOSINOPHIL # BLD AUTO: 0.1 K/UL
EOSINOPHIL NFR BLD: 1.9 %
ERYTHROCYTE [DISTWIDTH] IN BLOOD BY AUTOMATED COUNT: 13 %
EST. GFR  (AFRICAN AMERICAN): 50 ML/MIN/1.73 M^2
EST. GFR  (NON AFRICAN AMERICAN): 44 ML/MIN/1.73 M^2
GLUCOSE SERPL-MCNC: 95 MG/DL
HCT VFR BLD AUTO: 38.9 %
HGB BLD-MCNC: 13.2 G/DL
LYMPHOCYTES # BLD AUTO: 0.7 K/UL
LYMPHOCYTES NFR BLD: 14.1 %
MAGNESIUM SERPL-MCNC: 2.2 MG/DL
MCH RBC QN AUTO: 30.8 PG
MCHC RBC AUTO-ENTMCNC: 33.9 G/DL
MCV RBC AUTO: 91 FL
MONOCYTES # BLD AUTO: 0.8 K/UL
MONOCYTES NFR BLD: 16.9 %
NEUTROPHILS # BLD AUTO: 3.1 K/UL
NEUTROPHILS NFR BLD: 66.7 %
PLATELET # BLD AUTO: 183 K/UL
PMV BLD AUTO: 10.3 FL
POTASSIUM SERPL-SCNC: 3.7 MMOL/L
RBC # BLD AUTO: 4.28 M/UL
SODIUM SERPL-SCNC: 139 MMOL/L
WBC # BLD AUTO: 4.67 K/UL

## 2017-11-12 PROCEDURE — 99220 PR INITIAL OBSERVATION CARE,LEVL III: CPT | Mod: ,,, | Performed by: INTERNAL MEDICINE

## 2017-11-12 PROCEDURE — 36415 COLL VENOUS BLD VENIPUNCTURE: CPT

## 2017-11-12 PROCEDURE — 80048 BASIC METABOLIC PNL TOTAL CA: CPT

## 2017-11-12 PROCEDURE — 25000003 PHARM REV CODE 250: Performed by: NURSE PRACTITIONER

## 2017-11-12 PROCEDURE — 85025 COMPLETE CBC W/AUTO DIFF WBC: CPT

## 2017-11-12 PROCEDURE — G0378 HOSPITAL OBSERVATION PER HR: HCPCS

## 2017-11-12 PROCEDURE — 83735 ASSAY OF MAGNESIUM: CPT

## 2017-11-12 RX ORDER — METOPROLOL TARTRATE 50 MG/1
100 TABLET ORAL 2 TIMES DAILY
Status: DISCONTINUED | OUTPATIENT
Start: 2017-11-12 | End: 2017-11-12 | Stop reason: HOSPADM

## 2017-11-12 RX ORDER — METOPROLOL TARTRATE 100 MG/1
100 TABLET ORAL 2 TIMES DAILY
Qty: 60 TABLET | Refills: 11 | Status: SHIPPED | OUTPATIENT
Start: 2017-11-12 | End: 2018-12-05 | Stop reason: SDUPTHER

## 2017-11-12 RX ADMIN — ATORVASTATIN CALCIUM 20 MG: 10 TABLET, FILM COATED ORAL at 09:11

## 2017-11-12 RX ADMIN — AMIODARONE HYDROCHLORIDE 200 MG: 200 TABLET ORAL at 09:11

## 2017-11-12 RX ADMIN — APIXABAN 5 MG: 2.5 TABLET, FILM COATED ORAL at 03:11

## 2017-11-12 RX ADMIN — LETROZOLE 2.5 MG: 2.5 TABLET, FILM COATED ORAL at 09:11

## 2017-11-12 RX ADMIN — METOPROLOL SUCCINATE 50 MG: 50 TABLET, EXTENDED RELEASE ORAL at 09:11

## 2017-11-12 RX ADMIN — ASPIRIN 81 MG CHEWABLE TABLET 81 MG: 81 TABLET CHEWABLE at 09:11

## 2017-11-12 RX ADMIN — METOPROLOL TARTRATE 100 MG: 50 TABLET ORAL at 01:11

## 2017-11-12 NOTE — DISCHARGE INSTRUCTIONS
Please call Dr. Washington's office on 11/13/17 to schedule MARIAELENA and appointment for this week

## 2017-11-12 NOTE — CONSULTS
Consult Note  Cardiology    Consult Requested By:  Reason for Consult: Afib    SUBJECTIVE:     History of Present Illness:  Patient is a 77 y.o. female with istory of HOCM s/p ETOH ablation 4/06, ventricular tachycardia, ICD, HTN, orthostatic hypotension, and HLP, Afib, Aflutter s/p ablation presented with mild SOB along with weakness and fatigue. Patient denies any fever, chills, CP, abd pain, n/v/d, dizziness, syncope, and all other sxs at this time. ED evaluation  BUN 35, Creatinine 1.7, , CXR shows New 1 cm pulmonary nodule right upper lobe. Patient admitted to observation.     Today pt feels better, HR increased to 120s/130s with AF. Pt not on anticoagulation, unsure why it wasn't started. She has EP/RFA CTI  scheduled in 2 weeks, has been on Amio 200 mg daily. Had mildly elevated Cr, improved with oral hydration.     Review of patient's allergies indicates:  No Known Allergies    Past Medical History:   Diagnosis Date    *Atrial fibrillation     *Atrial flutter     Breast cancer     Cardiomyopathy     HOCM (hypertrophic obstructive cardiomyopathy) 9/6/2013    Hyperlipidemia     Hypertension     Macular degeneration     Orthostatic hypotension     Ventricular tachycardia      Past Surgical History:   Procedure Laterality Date    BREAST BIOPSY      CARDIAC ELECTROPHYSIOLOGY STUDY AND ABLATION  4/06    CATARACT EXTRACTION      ICD implantation      INSERT / REPLACE / REMOVE PACEMAKER       Family History   Problem Relation Age of Onset    Cancer Mother     Heart disease Mother     Heart disease Father     Cancer Brother 60     Social History   Substance Use Topics    Smoking status: Never Smoker    Smokeless tobacco: Never Used    Alcohol use No        Review of Systems:  Constitutional: Positive for fatigue. Negative for activity change, appetite change, chills and fever.   HENT: Negative.    Eyes: Negative.    Respiratory: Positive for shortness of breath. Negative for cough  and chest tightness.    Cardiovascular: Negative.  Negative for chest pain, palpitations and leg swelling.   Gastrointestinal: Negative.  Negative for abdominal pain, diarrhea, nausea and vomiting.   Endocrine: Negative.    Genitourinary: Negative.  Negative for dysuria, flank pain, frequency and urgency.   Musculoskeletal: Negative.    Skin: Negative.    Allergic/Immunologic: Negative.    Neurological: Positive for weakness. Negative for dizziness, syncope, numbness and headaches.   Hematological: Negative.    Psychiatric/Behavioral: Negative.      OBJECTIVE:     Vital Signs (Most Recent)  Temp: 98.2 °F (36.8 °C) (11/12/17 0813)  Pulse: (!) 126 (11/12/17 0813)  Resp: 18 (11/12/17 0813)  BP: 109/65 (11/12/17 0813)  SpO2: 98 % (11/12/17 0813)    Vital Signs Range (Last 24H):  Temp:  [97.2 °F (36.2 °C)-98.2 °F (36.8 °C)]   Pulse:  []   Resp:  [15-20]   BP: ()/(63-81)   SpO2:  [95 %-99 %]     Current Facility-Administered Medications   Medication    amiodarone tablet 200 mg    aspirin chewable tablet 81 mg    atorvastatin tablet 20 mg    donepezil tablet 10 mg    influenza (FLUZONE HIGH-DOSE) vaccine 0.5 mL    letrozole tablet 2.5 mg    metoprolol tartrate (LOPRESSOR) tablet 100 mg     No current facility-administered medications on file prior to encounter.      Current Outpatient Prescriptions on File Prior to Encounter   Medication Sig    amiodarone (PACERONE) 200 MG Tab Take 1 tablet (200 mg total) by mouth once daily.    aspirin 81 mg Tab Take 1 tablet by mouth Daily.     atenolol (TENORMIN) 50 MG tablet Take 1 tablet (50 mg total) by mouth every evening.    atorvastatin (LIPITOR) 20 MG tablet TAKE 1 TABLET BY MOUTH EVERY DAY    calcium citrate-vitamin D3 315-200 mg (CITRACAL+D) 315-200 mg-unit per tablet Take 1 tablet by mouth 2 (two) times daily.      cholecalciferol, vitamin D3, 1,000 unit capsule Take 1 capsule (1,000 Units total) by mouth once daily.    donepezil (ARICEPT) 10 MG  tablet Take 1 tablet (10 mg total) by mouth every evening.    hydrochlorothiazide (MICROZIDE) 12.5 mg capsule TAKE 1 CAPSULE(12.5 MG) BY MOUTH EVERY DAY    letrozole (FEMARA) 2.5 mg Tab Take 1 tablet (2.5 mg total) by mouth once daily.    lisinopril 10 MG tablet Take 0.5 tablets (5 mg total) by mouth once daily. 1 tablet Oral Every day    metoprolol succinate (TOPROL-XL) 50 MG 24 hr tablet Take 1 tablet (50 mg total) by mouth once daily.    walker (ULTRA-LIGHT ROLLATOR) Misc 1 each by Misc.(Non-Drug; Combo Route) route daily as needed.       Physical Exam:  Constitutional: She is oriented to person, place, and time. She appears well-developed and well-nourished.   HENT:   Head: Normocephalic and atraumatic.   Eyes: Conjunctivae and EOM are normal. Pupils are equal, round, and reactive to light.   Neck: Normal range of motion. Neck supple.   Cardiovascular: Normal rate, normal heart sounds and intact distal pulses.  An irregularly irregular rhythm present.   Pulmonary/Chest: Effort normal and breath sounds normal.   Abdominal: Soft. Bowel sounds are normal.   Musculoskeletal: Normal range of motion.   Neurological: She is alert and oriented to person, place, and time. She has normal reflexes.   Skin: Skin is warm and dry.   Psychiatric: She has a normal mood and affect. Her behavior is normal. Judgment and thought content normal.   Nursing note and vitals reviewed.  Laboratory:  Chemistry:   Lab Results   Component Value Date     11/12/2017    K 3.7 11/12/2017     11/12/2017    CO2 28 11/12/2017    BUN 29 (H) 11/12/2017    CREATININE 1.2 11/12/2017    CALCIUM 9.6 11/12/2017     Cardiac Markers:   Lab Results   Component Value Date    TROPONINI 0.020 11/11/2017     Cardiac Markers (Last 3):   Lab Results   Component Value Date    TROPONINI 0.020 11/11/2017     CBC:   Lab Results   Component Value Date    WBC 4.67 11/12/2017    HGB 13.2 11/12/2017    HCT 38.9 11/12/2017    MCV 91 11/12/2017      11/12/2017     Lipids:   Lab Results   Component Value Date    CHOL 169 03/17/2016    TRIG 77 03/17/2016    HDL 56 03/17/2016     Coagulation:   Lab Results   Component Value Date    INR 1.0 08/14/2013    APTT 26.6 08/14/2013       Diagnostic Results:  ECG: Reviewed  X-Ray: Reviewed  Chest x-ray single view     Indication: Shortness of breath.     Findings: Comparison study 4/30/2015.  There is a 1 cm nodular opacity right upper lobe projected between the 1st and 2nd anterior ribs, new since the prior study.  Otherwise, clear lungs.  Normal size heart.  No congestion.  Left chest wall AICD with intact leads.  No pneumothorax or pleural fluid collection.  Chronic right rotator cuff tear with narrowed acromiohumeral interval.    Echo: Reviewed      ASSESSMENT/PLAN:     Patient Active Problem List   Diagnosis    Paroxysmal ventricular tachycardia    Implantable cardioverter-defibrillator (ICD) in situ    Paroxysmal atrial fibrillation    Atrial flutter    Orthostatic hypotension    HTN (hypertension)    Hypothyroidism    Uncontrolled stage 2 hypertension    Subclinical hypothyroidism    Hyperlipidemia with target LDL less than 130    Osteopenia    Fitting and adjustment of automatic implantable cardiac defibrillator    HOCM (hypertrophic obstructive cardiomyopathy)    At risk for amiodarone toxicity with long term use    RBBB    Epiretinal membrane    Central retinal vein occlusion    Nonexudative senile macular degeneration of retina    Malignant neoplasm of upper-outer quadrant of right female breast    Aromatase inhibitor use    Typical atrial flutter    Acute kidney failure        Plan:   1. AFib  - continue rate control with BB and AMio  - start Eliquis for a/c  - discussed risk of bleeding and monitor for s/s anemia  - EP/RFA CTI  scheduled in 2 weeks,  - follow up with Dr. Lynn this week    2. HTN  - cont meds as needed, BP low today   - check orthostatics  - ok to DC once pt stable and  follow up with EP clinic    Sean Villatoro MD  Cardiovascular Disease  [facility] [place]  [phone] (P)

## 2017-11-12 NOTE — HOSPITAL COURSE
The patient was admitted to observation unit for paroxysmal atrial fibrillation. During this stay cardiology consulted, Lopressor 100 mg bid initiated for rate control and eliquis initiated per cardiology recommendations. Pt has a hx of Afib, Aflutter, and ablation with scheduled ablation on 11/21/17 with Dr. Huaser. Patient will follow-up with Dr. Hauser this week for MARIAELENA and ablation per cardiology. Patient denies any fever, chills, CP, abd pain, n/v/d, dizziness, syncope, weakness. VSS remained stable. Patient will follow-up with PCP in 3 days. Patient was seen and examined today and was determined stable for discharge.

## 2017-11-12 NOTE — PLAN OF CARE
DANIELLE form signed.        11/12/17 0849   DANIELLE Message   Medicare Outpatient and Observation Notification regarding financial responsibility Given to patient/caregiver;Signed/date by patient/caregiver;Explained to patient/caregiver   Date DANIELLE was signed 11/12/17   Time DANIELLE was signed 0836

## 2017-11-12 NOTE — ASSESSMENT & PLAN NOTE
EKG:Atrial fibrillation with rapid ventricular response with premature ventricular or aberrantly conducted complexes. Right bundle branch block, rate 129.  Telemetry monitoring   ASA   Amiodarone and Metoprolol   Cardiology consult   Heart Rate low 100's    11/12/17: increase HR with ambulation increase metoprolol per cardiology, eliquis initiated. Will have MARIAELENA/Ablation this week per cardiology

## 2017-11-12 NOTE — NURSING
New pt admitted to Tele from ER. Tele monitor in place, currently Afib 's. Daughter at bedside. Admit completed. MD notified of admit. Will continue to monitor.

## 2017-11-12 NOTE — DISCHARGE SUMMARY
[facility] Hospital Medicine  Discharge Summary      Patient Name: Karena Young  MRN: [id number]  Admission Date: 11/11/2017  Hospital Length of Stay: 0 days  Discharge Date and Time:  11/12/2017 3:10 PM  Attending Physician: Maciej Stanton MD   Discharging Provider: GHASSAN Marcial  Primary Care Provider: Lee Dwyer MD      HPI:   Karena Young is a 77 y.o. female patient  with PMHx HTN, Breast Cancer, A-fib, Cardiomyopathy,AICD, and Hyperlipidemia who presents to the Emergency Department for SOB which onset gradually this morning. Pt states that she was seen at a clinic today and told to come to ED for further evaluation.  Pt has a hx of Afib, Aflutter, and ablation.Patient has an ablation scheduled in 2 weeks. Associated sxs include weakness and fatigue. Patient denies any fever, chills, CP, abd pain, n/v/d, dizziness, syncope, and all other sxs at this time. ED evaluation  BUN 35, Creatinine 1.7, , CXR shows New 1 cm pulmonary nodule right upper lobe. Patient admitted to observation.        * No surgery found *      Hospital Course:   The patient was admitted to observation unit for paroxysmal atrial fibrillation. During this stay cardiology consulted, Lopressor 100 mg bid initiated for rate control and eliquis initiated per cardiology recommendations. Pt has a hx of Afib, Aflutter, and ablation with scheduled ablation on 11/21/17 with Dr. Hauser. Patient will follow-up with Dr. Hauser this week for MARIAELENA and ablation per cardiology. Patient denies any fever, chills, CP, abd pain, n/v/d, dizziness, syncope, weakness. VSS remained stable. Patient will follow-up with PCP in 3 days. Patient was seen and examined today and was determined stable for discharge.        Consults:   Consults         Status Ordering Provider     Inpatient consult to Cardiology  Once     Provider:  Sean Villatoro MD    Completed TIA MARION.          * Paroxysmal atrial  fibrillation    EKG:Atrial fibrillation with rapid ventricular response with premature ventricular or aberrantly conducted complexes. Right bundle branch block, rate 129.  Telemetry monitoring   ASA   Amiodarone and Metoprolol   Cardiology consult   Heart Rate low 100's    11/12/17: increase HR with ambulation increase metoprolol per cardiology, eliquis initiated. Will have MARIAELENA/Ablation this week per cardiology              Final Active Diagnoses:    Diagnosis Date Noted POA    PRINCIPAL PROBLEM:  Paroxysmal atrial fibrillation [I48.0] 09/12/2012 Yes    HTN (hypertension) [I10] 09/12/2012 Yes      Problems Resolved During this Admission:    Diagnosis Date Noted Date Resolved POA    Acute kidney failure [N17.9] 11/12/2017 11/12/2017 Yes       Discharged Condition: stable    Disposition: Home or Self Care    Follow Up:  Follow-up Information     Lee Dwyer MD In 3 days.    Specialty:  Family Medicine  Contact information:  [address] [place] [place] [address] [phone]                 Patient Instructions:     Diet general   Order Comments: Cardiac diet     Activity as tolerated         Significant Diagnostic Studies: Labs:   BMP:   Recent Labs  Lab 11/11/17 1357 11/12/17  0418    95    139   K 3.9 3.7   CL 97 101   CO2 26 28   BUN 35* 29*   CREATININE 1.7* 1.2   CALCIUM 10.1 9.6   MG  --  2.2   , CMP   Recent Labs  Lab 11/11/17 1357 11/12/17  0418    139   K 3.9 3.7   CL 97 101   CO2 26 28    95   BUN 35* 29*   CREATININE 1.7* 1.2   CALCIUM 10.1 9.6   ANIONGAP 14 10   ESTGFRAFRICA 33* 50*   EGFRNONAA 29* 44*   , CBC   Recent Labs  Lab 11/11/17 1357 11/12/17  0418   WBC 7.58 4.67   HGB 14.0 13.2   HCT 41.1 38.9    183    and Troponin   Recent Labs  Lab 11/11/17 1357   TROPONINI 0.020       Pending Diagnostic Studies:     None         Medications:  Reconciled Home Medications:   Current Discharge Medication List      START taking these medications    Details    apixaban 5 mg Tab Take 1 tablet (5 mg total) by mouth 2 (two) times daily.  Qty: 60 tablet, Refills: 2      metoprolol tartrate (LOPRESSOR) 100 MG tablet Take 1 tablet (100 mg total) by mouth 2 (two) times daily.  Qty: 60 tablet, Refills: 11         CONTINUE these medications which have NOT CHANGED    Details   amiodarone (PACERONE) 200 MG Tab Take 1 tablet (200 mg total) by mouth once daily.  Qty: 30 tablet, Refills: 11    Associated Diagnoses: Chronic atrial fibrillation      aspirin 81 mg Tab Take 1 tablet by mouth Daily.       atorvastatin (LIPITOR) 20 MG tablet TAKE 1 TABLET BY MOUTH EVERY DAY  Qty: 90 tablet, Refills: 2      calcium citrate-vitamin D3 315-200 mg (CITRACAL+D) 315-200 mg-unit per tablet Take 1 tablet by mouth 2 (two) times daily.        cholecalciferol, vitamin D3, 1,000 unit capsule Take 1 capsule (1,000 Units total) by mouth once daily.  Qty: 90 capsule, Refills: 3    Associated Diagnoses: Vitamin D deficiency      donepezil (ARICEPT) 10 MG tablet Take 1 tablet (10 mg total) by mouth every evening.  Qty: 30 tablet, Refills: 11      hydrochlorothiazide (MICROZIDE) 12.5 mg capsule TAKE 1 CAPSULE(12.5 MG) BY MOUTH EVERY DAY  Qty: 30 capsule, Refills: 6      letrozole (FEMARA) 2.5 mg Tab Take 1 tablet (2.5 mg total) by mouth once daily.  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 1 tablet Oral Every day  Qty: 30 tablet, Refills: 6    Associated Diagnoses: Orthostatic hypotension      walker (ULTRA-LIGHT ROLLATOR) Misc 1 each by Misc.(Non-Drug; Combo Route) route daily as needed.  Refills: 0    Associated Diagnoses: Dyspnea on exertion; Cardiomyopathy         STOP taking these medications       atenolol (TENORMIN) 50 MG tablet Comments:   Reason for Stopping:         metoprolol succinate  (TOPROL-XL) 50 MG 24 hr tablet Comments:   Reason for Stopping:               Indwelling Lines/Drains at time of discharge:   Lines/Drains/Airways          No matching active lines, drains, or airways          Time spent on the discharge of patient: 35 minutes  Patient was seen and examined on the date of discharge and determined to be suitable for discharge.         Pola Mendes DNP, ACNP-Bc, CCRN  Department of Hospital Medicine  [facility]

## 2017-11-12 NOTE — NURSING
Pt discharged per MD order. Discharge instructions, appts and medications reviewed with pt and daughter. All questions answered. Tele monitor and IV removed. Pt taken down to family vehicle by wheelchair without incident or complaint.

## 2017-11-12 NOTE — PROGRESS NOTES
Report received assumed care, AAOx3 calm cooperative, assessment per flowsheet.  Afib 116 noted to tele monitor, denies any SOB or heart palpitations at present time, will cont to monitor.

## 2017-11-13 ENCOUNTER — TELEPHONE (OUTPATIENT)
Dept: ELECTROPHYSIOLOGY | Facility: CLINIC | Age: 77
End: 2017-11-13

## 2017-11-13 NOTE — TELEPHONE ENCOUNTER
----- Message from Able Gibbons MA sent at 11/13/2017  2:40 PM CST -----  Contact: Pt sister Bernabe      ----- Message -----  From: Mickey Toussaint  Sent: 11/13/2017   2:37 PM  To: Analisa MEDEROS Staff    Pt would like a call back regarding her sister's car not sure what type of procedure she's having     PT sister Bernabe  can be reached at [phone] or [phone]

## 2017-11-13 NOTE — TELEPHONE ENCOUNTER
Spoke with pt's sister and advised she will have atrial flutter RFA as planned on 11/21/17. Instructed to have pt take 2.5 mg Eliquis before 5 pm on evening before procedure, and hold eliquis on morning of procedure. Understanding verbalized.

## 2017-11-14 ENCOUNTER — TELEPHONE (OUTPATIENT)
Dept: CARDIOLOGY | Facility: CLINIC | Age: 77
End: 2017-11-14

## 2017-11-14 NOTE — TELEPHONE ENCOUNTER
Returned phone call to Bernabe regarding Eliquis prescription and 30 day free coupon faxed to Pharmacy advised that gives us time to get PA for medication

## 2017-11-14 NOTE — TELEPHONE ENCOUNTER
----- Message from Tatum Mcclain sent at 11/14/2017  8:28 AM CST -----  Contact: Hui, Sister  Sister is calling to speak with Staff regarding the pt.  Medication for a blood thinner was ordered, but the insurance company will not pay for it and she wants to know if something else has been ordered.  Sister says she is to have a procedure next week and needs information and advise.    She can be reached at [phone], or [phone] (Cell).    Thank you.

## 2017-11-16 ENCOUNTER — LAB VISIT (OUTPATIENT)
Dept: LAB | Facility: HOSPITAL | Age: 77
End: 2017-11-16
Attending: INTERNAL MEDICINE
Payer: MEDICARE

## 2017-11-16 DIAGNOSIS — I48.92 ATRIAL FLUTTER, UNSPECIFIED TYPE: ICD-10-CM

## 2017-11-16 LAB
APTT BLDCRRT: 26.2 SEC
BASOPHILS # BLD AUTO: 0.04 K/UL
BASOPHILS NFR BLD: 0.8 %
DIFFERENTIAL METHOD: ABNORMAL
EOSINOPHIL # BLD AUTO: 0.1 K/UL
EOSINOPHIL NFR BLD: 2.1 %
ERYTHROCYTE [DISTWIDTH] IN BLOOD BY AUTOMATED COUNT: 12.4 %
HCT VFR BLD AUTO: 37.3 %
HGB BLD-MCNC: 12.4 G/DL
IMM GRANULOCYTES # BLD AUTO: 0.02 K/UL
IMM GRANULOCYTES NFR BLD AUTO: 0.4 %
INR PPP: 1
LYMPHOCYTES # BLD AUTO: 0.7 K/UL
LYMPHOCYTES NFR BLD: 14.4 %
MCH RBC QN AUTO: 31.1 PG
MCHC RBC AUTO-ENTMCNC: 33.2 G/DL
MCV RBC AUTO: 94 FL
MONOCYTES # BLD AUTO: 0.4 K/UL
MONOCYTES NFR BLD: 8.6 %
NEUTROPHILS # BLD AUTO: 3.6 K/UL
NEUTROPHILS NFR BLD: 73.7 %
NRBC BLD-RTO: 0 /100 WBC
PLATELET # BLD AUTO: 236 K/UL
PMV BLD AUTO: 11.4 FL
PROTHROMBIN TIME: 11 SEC
RBC # BLD AUTO: 3.99 M/UL
WBC # BLD AUTO: 4.86 K/UL

## 2017-11-16 PROCEDURE — 36415 COLL VENOUS BLD VENIPUNCTURE: CPT | Mod: PO

## 2017-11-16 PROCEDURE — 85610 PROTHROMBIN TIME: CPT

## 2017-11-16 PROCEDURE — 85025 COMPLETE CBC W/AUTO DIFF WBC: CPT

## 2017-11-16 PROCEDURE — 85730 THROMBOPLASTIN TIME PARTIAL: CPT

## 2017-11-16 PROCEDURE — 80048 BASIC METABOLIC PNL TOTAL CA: CPT

## 2017-11-17 LAB
ANION GAP SERPL CALC-SCNC: 13 MMOL/L
BUN SERPL-MCNC: 35 MG/DL
CALCIUM SERPL-MCNC: 9.6 MG/DL
CHLORIDE SERPL-SCNC: 98 MMOL/L
CO2 SERPL-SCNC: 30 MMOL/L
CREAT SERPL-MCNC: 1.5 MG/DL
EST. GFR  (AFRICAN AMERICAN): 38.5 ML/MIN/1.73 M^2
EST. GFR  (NON AFRICAN AMERICAN): 33.4 ML/MIN/1.73 M^2
GLUCOSE SERPL-MCNC: 64 MG/DL
POTASSIUM SERPL-SCNC: 3.7 MMOL/L
SODIUM SERPL-SCNC: 141 MMOL/L

## 2017-11-21 ENCOUNTER — ANESTHESIA EVENT (OUTPATIENT)
Dept: MEDSURG UNIT | Facility: HOSPITAL | Age: 77
End: 2017-11-21
Payer: MEDICARE

## 2017-11-21 ENCOUNTER — HOSPITAL ENCOUNTER (OUTPATIENT)
Facility: HOSPITAL | Age: 77
Discharge: HOME | End: 2017-11-22
Attending: INTERNAL MEDICINE | Admitting: INTERNAL MEDICINE
Payer: MEDICARE

## 2017-11-21 ENCOUNTER — ANESTHESIA (OUTPATIENT)
Dept: MEDSURG UNIT | Facility: HOSPITAL | Age: 77
End: 2017-11-21
Payer: MEDICARE

## 2017-11-21 DIAGNOSIS — I48.92 ATRIAL FLUTTER: Primary | ICD-10-CM

## 2017-11-21 DIAGNOSIS — Z95.810 IMPLANTABLE CARDIOVERTER-DEFIBRILLATOR (ICD) IN SITU: ICD-10-CM

## 2017-11-21 DIAGNOSIS — I48.92 ATRIAL FLUTTER, UNSPECIFIED TYPE: ICD-10-CM

## 2017-11-21 LAB — POCT GLUCOSE: 86 MG/DL (ref 70–110)

## 2017-11-21 PROCEDURE — 93005 ELECTROCARDIOGRAM TRACING: CPT

## 2017-11-21 PROCEDURE — 93010 ELECTROCARDIOGRAM REPORT: CPT | Mod: ,,, | Performed by: INTERNAL MEDICINE

## 2017-11-21 PROCEDURE — D9220A PRA ANESTHESIA: Mod: ANES,,, | Performed by: ANESTHESIOLOGY

## 2017-11-21 PROCEDURE — C1731 CATH, EP, 20 OR MORE ELEC: HCPCS

## 2017-11-21 PROCEDURE — 63600175 PHARM REV CODE 636 W HCPCS: Performed by: NURSE ANESTHETIST, CERTIFIED REGISTERED

## 2017-11-21 PROCEDURE — 37000009 HC ANESTHESIA EA ADD 15 MINS: Performed by: INTERNAL MEDICINE

## 2017-11-21 PROCEDURE — D9220A PRA ANESTHESIA: Mod: CRNA,,, | Performed by: NURSE ANESTHETIST, CERTIFIED REGISTERED

## 2017-11-21 PROCEDURE — 82962 GLUCOSE BLOOD TEST: CPT | Performed by: INTERNAL MEDICINE

## 2017-11-21 PROCEDURE — C1733 CATH, EP, OTHR THAN COOL-TIP: HCPCS

## 2017-11-21 PROCEDURE — 93621 COMP EP EVL L PAC&REC C SINS: CPT | Mod: 26,,, | Performed by: INTERNAL MEDICINE

## 2017-11-21 PROCEDURE — 25000003 PHARM REV CODE 250: Performed by: NURSE ANESTHETIST, CERTIFIED REGISTERED

## 2017-11-21 PROCEDURE — 25000003 PHARM REV CODE 250

## 2017-11-21 PROCEDURE — 25000003 PHARM REV CODE 250: Performed by: NURSE PRACTITIONER

## 2017-11-21 PROCEDURE — 93287 PERI-PX DEVICE EVAL & PRGR: CPT | Mod: 26,,, | Performed by: INTERNAL MEDICINE

## 2017-11-21 PROCEDURE — 93653 COMPRE EP EVAL TX SVT: CPT | Mod: ,,, | Performed by: INTERNAL MEDICINE

## 2017-11-21 PROCEDURE — 93613 INTRACARDIAC EPHYS 3D MAPG: CPT | Mod: ,,, | Performed by: INTERNAL MEDICINE

## 2017-11-21 PROCEDURE — 25000003 PHARM REV CODE 250: Performed by: INTERNAL MEDICINE

## 2017-11-21 PROCEDURE — 63600175 PHARM REV CODE 636 W HCPCS

## 2017-11-21 PROCEDURE — 37000008 HC ANESTHESIA 1ST 15 MINUTES: Performed by: INTERNAL MEDICINE

## 2017-11-21 RX ORDER — ATORVASTATIN CALCIUM 20 MG/1
20 TABLET, FILM COATED ORAL DAILY
Status: DISCONTINUED | OUTPATIENT
Start: 2017-11-22 | End: 2017-11-22 | Stop reason: HOSPADM

## 2017-11-21 RX ORDER — LIDOCAINE HCL/PF 100 MG/5ML
SYRINGE (ML) INTRAVENOUS
Status: DISCONTINUED | OUTPATIENT
Start: 2017-11-21 | End: 2017-11-21

## 2017-11-21 RX ORDER — CEFAZOLIN SODIUM 2 G/50ML
2 SOLUTION INTRAVENOUS
Status: DISCONTINUED | OUTPATIENT
Start: 2017-11-21 | End: 2017-11-21

## 2017-11-21 RX ORDER — PROPOFOL 10 MG/ML
VIAL (ML) INTRAVENOUS CONTINUOUS PRN
Status: DISCONTINUED | OUTPATIENT
Start: 2017-11-21 | End: 2017-11-21

## 2017-11-21 RX ORDER — ONDANSETRON 2 MG/ML
INJECTION INTRAMUSCULAR; INTRAVENOUS
Status: DISCONTINUED | OUTPATIENT
Start: 2017-11-21 | End: 2017-11-21

## 2017-11-21 RX ORDER — METOPROLOL TARTRATE 100 MG/1
100 TABLET ORAL 2 TIMES DAILY
Status: DISCONTINUED | OUTPATIENT
Start: 2017-11-21 | End: 2017-11-22 | Stop reason: HOSPADM

## 2017-11-21 RX ORDER — LISINOPRIL 5 MG/1
5 TABLET ORAL DAILY
Status: DISCONTINUED | OUTPATIENT
Start: 2017-11-22 | End: 2017-11-22 | Stop reason: HOSPADM

## 2017-11-21 RX ORDER — MIDAZOLAM HYDROCHLORIDE 1 MG/ML
INJECTION, SOLUTION INTRAMUSCULAR; INTRAVENOUS
Status: DISCONTINUED | OUTPATIENT
Start: 2017-11-21 | End: 2017-11-21

## 2017-11-21 RX ORDER — LETROZOLE 2.5 MG/1
2.5 TABLET, FILM COATED ORAL DAILY
Status: DISCONTINUED | OUTPATIENT
Start: 2017-11-22 | End: 2017-11-22 | Stop reason: HOSPADM

## 2017-11-21 RX ORDER — DIPHENHYDRAMINE HYDROCHLORIDE 50 MG/ML
25 INJECTION INTRAMUSCULAR; INTRAVENOUS EVERY 6 HOURS PRN
Status: DISCONTINUED | OUTPATIENT
Start: 2017-11-21 | End: 2017-11-21

## 2017-11-21 RX ORDER — FENTANYL CITRATE 50 UG/ML
INJECTION, SOLUTION INTRAMUSCULAR; INTRAVENOUS
Status: DISCONTINUED | OUTPATIENT
Start: 2017-11-21 | End: 2017-11-21

## 2017-11-21 RX ORDER — PHENYLEPHRINE HYDROCHLORIDE 10 MG/ML
INJECTION INTRAVENOUS
Status: DISCONTINUED | OUTPATIENT
Start: 2017-11-21 | End: 2017-11-21

## 2017-11-21 RX ORDER — HYDROCHLOROTHIAZIDE 12.5 MG/1
12.5 TABLET ORAL DAILY
Status: DISCONTINUED | OUTPATIENT
Start: 2017-11-22 | End: 2017-11-22 | Stop reason: HOSPADM

## 2017-11-21 RX ORDER — FENTANYL CITRATE 50 UG/ML
25 INJECTION, SOLUTION INTRAMUSCULAR; INTRAVENOUS EVERY 5 MIN PRN
Status: DISCONTINUED | OUTPATIENT
Start: 2017-11-21 | End: 2017-11-21

## 2017-11-21 RX ORDER — SODIUM CHLORIDE 9 MG/ML
INJECTION, SOLUTION INTRAVENOUS CONTINUOUS
Status: DISCONTINUED | OUTPATIENT
Start: 2017-11-21 | End: 2017-11-22 | Stop reason: HOSPADM

## 2017-11-21 RX ORDER — HYDROMORPHONE HYDROCHLORIDE 1 MG/ML
0.2 INJECTION, SOLUTION INTRAMUSCULAR; INTRAVENOUS; SUBCUTANEOUS EVERY 5 MIN PRN
Status: DISCONTINUED | OUTPATIENT
Start: 2017-11-21 | End: 2017-11-21

## 2017-11-21 RX ORDER — DONEPEZIL HYDROCHLORIDE 5 MG/1
10 TABLET, FILM COATED ORAL NIGHTLY
Status: DISCONTINUED | OUTPATIENT
Start: 2017-11-21 | End: 2017-11-22 | Stop reason: HOSPADM

## 2017-11-21 RX ORDER — AMIODARONE HYDROCHLORIDE 200 MG/1
200 TABLET ORAL DAILY
Status: DISCONTINUED | OUTPATIENT
Start: 2017-11-22 | End: 2017-11-22 | Stop reason: HOSPADM

## 2017-11-21 RX ADMIN — PHENYLEPHRINE HYDROCHLORIDE 100 MCG: 10 INJECTION INTRAVENOUS at 04:11

## 2017-11-21 RX ADMIN — METOPROLOL TARTRATE 100 MG: 100 TABLET ORAL at 09:11

## 2017-11-21 RX ADMIN — MIDAZOLAM 1 MG: 1 INJECTION INTRAMUSCULAR; INTRAVENOUS at 03:11

## 2017-11-21 RX ADMIN — MIDAZOLAM 1 MG: 1 INJECTION INTRAMUSCULAR; INTRAVENOUS at 02:11

## 2017-11-21 RX ADMIN — PROPOFOL 50 MCG/KG/MIN: 10 INJECTION, EMULSION INTRAVENOUS at 03:11

## 2017-11-21 RX ADMIN — ONDANSETRON 4 MG: 2 INJECTION INTRAMUSCULAR; INTRAVENOUS at 05:11

## 2017-11-21 RX ADMIN — APIXABAN 5 MG: 2.5 TABLET, FILM COATED ORAL at 09:11

## 2017-11-21 RX ADMIN — FENTANYL CITRATE 25 MCG: 50 INJECTION, SOLUTION INTRAMUSCULAR; INTRAVENOUS at 04:11

## 2017-11-21 RX ADMIN — EPHEDRINE SULFATE 5 MG: 50 INJECTION, SOLUTION INTRAMUSCULAR; INTRAVENOUS; SUBCUTANEOUS at 03:11

## 2017-11-21 RX ADMIN — FENTANYL CITRATE 25 MCG: 50 INJECTION, SOLUTION INTRAMUSCULAR; INTRAVENOUS at 03:11

## 2017-11-21 RX ADMIN — SODIUM CHLORIDE 1000 ML: 0.9 INJECTION, SOLUTION INTRAVENOUS at 08:11

## 2017-11-21 RX ADMIN — DONEPEZIL HYDROCHLORIDE 10 MG: 5 TABLET, FILM COATED ORAL at 09:11

## 2017-11-21 RX ADMIN — PHENYLEPHRINE HYDROCHLORIDE 50 MCG: 10 INJECTION INTRAVENOUS at 04:11

## 2017-11-21 RX ADMIN — LIDOCAINE HYDROCHLORIDE 60 MG: 20 INJECTION, SOLUTION INTRAVENOUS at 03:11

## 2017-11-21 NOTE — H&P
[facility]  Cardiac Electrophysiology  History and Physical     Admission Date: 11/21/2017  Code Status: Prior   Attending Provider: Jake Hauser MD   Principal Problem:Atrial flutter    Subjective:     Chief Complaint:  Atrial Flutter     HPI:  77 year old woman with PMH of CKD,  HOCM s/p ETOH ablation 4/06, ventricular tachycardia, ICD, HTN, orthostatic hypotension, and HLD. Also noted to have transient A Flutter, AF, and had VT > pt is currently on amiodarone.   ICD initially implanted by Dr Gonzales in 11/04 for syncope with VTNS. Pt seen by MD Hauser, with noted ICD shocks related to Atrial Flutter  and planned for RFA atrial flutter ablation. Pt denies any symptoms of chest pains, shortness of breath, bleeding, fevers chills.     EKG today reveals NSR at 62 BPM, 1st degree AV block.         Past Medical History:   Diagnosis Date    *Atrial fibrillation     *Atrial flutter     Breast cancer     Cardiomyopathy     HOCM (hypertrophic obstructive cardiomyopathy) 9/6/2013    Hyperlipidemia     Hypertension     Macular degeneration     Orthostatic hypotension     Ventricular tachycardia        Past Surgical History:   Procedure Laterality Date    BREAST BIOPSY      CARDIAC ELECTROPHYSIOLOGY STUDY AND ABLATION  4/06    CATARACT EXTRACTION      ICD implantation      INSERT / REPLACE / REMOVE PACEMAKER         Review of patient's allergies indicates:  No Known Allergies    No current facility-administered medications on file prior to encounter.      Current Outpatient Prescriptions on File Prior to Encounter   Medication Sig    amiodarone (PACERONE) 200 MG Tab Take 1 tablet (200 mg total) by mouth once daily.    aspirin 81 mg Tab Take 1 tablet by mouth Daily.     atorvastatin (LIPITOR) 20 MG tablet TAKE 1 TABLET BY MOUTH EVERY DAY    calcium citrate-vitamin D3 315-200 mg (CITRACAL+D) 315-200 mg-unit per tablet Take 1 tablet by mouth 2 (two) times daily.       donepezil (ARICEPT) 10 MG tablet Take 1 tablet (10 mg total) by mouth every evening.    hydrochlorothiazide (MICROZIDE) 12.5 mg capsule TAKE 1 CAPSULE(12.5 MG) BY MOUTH EVERY DAY    letrozole (FEMARA) 2.5 mg Tab Take 1 tablet (2.5 mg total) by mouth once daily.    lisinopril 10 MG tablet Take 0.5 tablets (5 mg total) by mouth once daily. 1 tablet Oral Every day    walker (ULTRA-LIGHT ROLLATOR) Misc 1 each by Misc.(Non-Drug; Combo Route) route daily as needed.    cholecalciferol, vitamin D3, 1,000 unit capsule Take 1 capsule (1,000 Units total) by mouth once daily.     Family History     Problem Relation (Age of Onset)    Cancer Mother, Brother (60)    Heart disease Mother, Father        Social History Main Topics    Smoking status: Never Smoker    Smokeless tobacco: Never Used    Alcohol use No    Drug use: No    Sexual activity: Not Currently     Partners: Male     ROS   Review of Systems   Constitution: Negative for fevers/chills.   Positive for easily gets    weak and has malaise/fatigue.   HENT: Negative for ear pain and tinnitus.   Eyes: Negative for blurred vision.   Cardiovascular: Positive for palpitations when in atrial flutter( none currently . Negative for chest pain,  near-syncope, and syncope.   Respiratory:  Positive for shortness of breath on exertion Endocrine:  Negative for polyuria.   Hematologic/Lymphatic: Negative for bruise/bleed easily.   Skin:  Negative for rash.   Musculoskeletal: Negative for joint pain and muscle weakness.   Extremity: Negative for swelling in the lower extremities.   Gastrointestinal:  Negative for abdominal pain and change in bowel habit.   Genitourinary: Negative for frequency.   Neurological:  positive for occasional  dizziness.   Psychiatric/Behavioral:  Negative for depression, anxiety       Objective:     Vital Signs (Most Recent):  Temp: 97 °F (36.1 °C) (11/21/17 0822)  Pulse: 72 (11/21/17 0822)  Resp: 16 (11/21/17 0822)  BP: (!) 150/66 (11/21/17  0823)  SpO2: 98 % (11/21/17 0822) Vital Signs (24h Range):  Temp:  [97 °F (36.1 °C)] 97 °F (36.1 °C)  Pulse:  [72] 72  Resp:  [16] 16  SpO2:  [98 %] 98 %  BP: (127-150)/(60-66) 150/66     Weight: 55.8 kg (123 lb)  Body mass index is 21.11 kg/m².    SpO2: 98 %  O2 Device (Oxygen Therapy): room air    Physical Exam   General: Well developed, well nourished, No distress on room air   HEENT: Head is normocephalic, atraumatic;  Lungs: Clear to auscultation bilaterally.  No wheezing. Normal respiratory effort.  Cardiovascular:  S1 S2 Normal rate, regular rhythm and normal heart sounds.    PMI is not displaced. ICD site clean and intact   Extremities: No LE edema. Venous stasis changes   Abdomen: Abdomen is soft, non-tender non-distended with normal bowel sounds.  Skin: Skin color, texture, turgor normal. No rashes.  Musculoskeletal: normal range of motion   Neurologic: Normal strength and tone. No focal numbness or weakness.   Psychiatric: normal mood and affect.  behavior is normal. Alert and oriented   Labs reviewed       Significant Labs: reviewed     Significant Imaging: ICD interrogations   ECHO   CONCLUSIONS     1 - Concentric remodeling.     2 - Normal left ventricular systolic function (EF 60-65%).     3 - Normal right ventricular systolic function .     4 - Left ventricular diastolic dysfunction.     5 - Severe left atrial enlargement.     6 - Mild aortic regurgitation.     7 - Mild mitral regurgitation.     8 - Mild pulmonary hypertension.     9 - Mildly elevated central venous pressure.         This document has been electronically    SIGNED BY: Jaylen Le MD On: 06/17/2014 13:59    Assessment and Plan:     * Atrial flutter      RFA for Atrial Flutter   Anesthesia for sedation                 Consent signed in clinic  Prior to procedure, extensive discussion with patient and daughter Ms Hernandez  regarding risks and benefits of  ablation, and patient    The patient and daughter  voices understanding and all  questions have been answered. No further questions/concerns voiced at this time.       DASH De Oliveira-BC  Cardiology Electrophysiology  NP   [facility]    Attending: MD Jake Hauser

## 2017-11-21 NOTE — SUBJECTIVE & OBJECTIVE
Past Medical History:   Diagnosis Date    *Atrial fibrillation     *Atrial flutter     Breast cancer     Cardiomyopathy     HOCM (hypertrophic obstructive cardiomyopathy) 9/6/2013    Hyperlipidemia     Hypertension     Macular degeneration     Orthostatic hypotension     Ventricular tachycardia        Past Surgical History:   Procedure Laterality Date    BREAST BIOPSY      CARDIAC ELECTROPHYSIOLOGY STUDY AND ABLATION  4/06    CATARACT EXTRACTION      ICD implantation      INSERT / REPLACE / REMOVE PACEMAKER         Review of patient's allergies indicates:  No Known Allergies    No current facility-administered medications on file prior to encounter.      Current Outpatient Prescriptions on File Prior to Encounter   Medication Sig    amiodarone (PACERONE) 200 MG Tab Take 1 tablet (200 mg total) by mouth once daily.    aspirin 81 mg Tab Take 1 tablet by mouth Daily.     atorvastatin (LIPITOR) 20 MG tablet TAKE 1 TABLET BY MOUTH EVERY DAY    calcium citrate-vitamin D3 315-200 mg (CITRACAL+D) 315-200 mg-unit per tablet Take 1 tablet by mouth 2 (two) times daily.      donepezil (ARICEPT) 10 MG tablet Take 1 tablet (10 mg total) by mouth every evening.    hydrochlorothiazide (MICROZIDE) 12.5 mg capsule TAKE 1 CAPSULE(12.5 MG) BY MOUTH EVERY DAY    letrozole (FEMARA) 2.5 mg Tab Take 1 tablet (2.5 mg total) by mouth once daily.    lisinopril 10 MG tablet Take 0.5 tablets (5 mg total) by mouth once daily. 1 tablet Oral Every day    walker (ULTRA-LIGHT ROLLATOR) Misc 1 each by Misc.(Non-Drug; Combo Route) route daily as needed.    cholecalciferol, vitamin D3, 1,000 unit capsule Take 1 capsule (1,000 Units total) by mouth once daily.     Family History     Problem Relation (Age of Onset)    Cancer Mother, Brother (60)    Heart disease Mother, Father        Social History Main Topics    Smoking status: Never Smoker    Smokeless tobacco: Never Used    Alcohol use No    Drug use: No    Sexual  activity: Not Currently     Partners: Male     ROS   Review of Systems   Constitution: Negative for fevers/chills.   Positive for easily gets    weak and has malaise/fatigue.   HENT: Negative for ear pain and tinnitus.   Eyes: Negative for blurred vision.   Cardiovascular: Positive for palpitations when in atrial flutter( none currently . Negative for chest pain,  near-syncope, and syncope.   Respiratory:  Positive for shortness of breath on exertion Endocrine:  Negative for polyuria.   Hematologic/Lymphatic: Negative for bruise/bleed easily.   Skin:  Negative for rash.   Musculoskeletal: Negative for joint pain and muscle weakness.   Extremity: Negative for swelling in the lower extremities.   Gastrointestinal:  Negative for abdominal pain and change in bowel habit.   Genitourinary: Negative for frequency.   Neurological:  positive for occasional  dizziness.   Psychiatric/Behavioral:  Negative for depression, anxiety       Objective:     Vital Signs (Most Recent):  Temp: 97 °F (36.1 °C) (11/21/17 0822)  Pulse: 72 (11/21/17 0822)  Resp: 16 (11/21/17 0822)  BP: (!) 150/66 (11/21/17 0823)  SpO2: 98 % (11/21/17 0822) Vital Signs (24h Range):  Temp:  [97 °F (36.1 °C)] 97 °F (36.1 °C)  Pulse:  [72] 72  Resp:  [16] 16  SpO2:  [98 %] 98 %  BP: (127-150)/(60-66) 150/66     Weight: 55.8 kg (123 lb)  Body mass index is 21.11 kg/m².    SpO2: 98 %  O2 Device (Oxygen Therapy): room air    Physical Exam   General: Well developed, well nourished, No distress on room air   HEENT: Head is normocephalic, atraumatic;  Lungs: Clear to auscultation bilaterally.  No wheezing. Normal respiratory effort.  Cardiovascular:  S1 S2 Normal rate, regular rhythm and normal heart sounds.    PMI is not displaced. ICD site clean and intact   Extremities: No LE edema. Venous stasis changes   Abdomen: Abdomen is soft, non-tender non-distended with normal bowel sounds.  Skin: Skin color, texture, turgor normal. No rashes.  Musculoskeletal: normal range  of motion   Neurologic: Normal strength and tone. No focal numbness or weakness.   Psychiatric: normal mood and affect.  behavior is normal. Alert and oriented X 3.  Labs reviewed       Significant Labs: reviewed     Significant Imaging: ICD interrogations   ECHO   CONCLUSIONS     1 - Concentric remodeling.     2 - Normal left ventricular systolic function (EF 60-65%).     3 - Normal right ventricular systolic function .     4 - Left ventricular diastolic dysfunction.     5 - Severe left atrial enlargement.     6 - Mild aortic regurgitation.     7 - Mild mitral regurgitation.     8 - Mild pulmonary hypertension.     9 - Mildly elevated central venous pressure.         This document has been electronically    SIGNED BY: Jaylen Le MD On: 06/17/2014 13:59

## 2017-11-21 NOTE — ANESTHESIA PREPROCEDURE EVALUATION
11/21/2017  Karena Young is a 77 y.o., female.  Patient Active Problem List   Diagnosis    Paroxysmal ventricular tachycardia    Implantable cardioverter-defibrillator (ICD) in situ    Paroxysmal atrial fibrillation    Atrial flutter    Orthostatic hypotension    HTN (hypertension)    Hypothyroidism    Uncontrolled stage 2 hypertension    Subclinical hypothyroidism    Hyperlipidemia with target LDL less than 130    Osteopenia    Fitting and adjustment of automatic implantable cardiac defibrillator    HOCM (hypertrophic obstructive cardiomyopathy)    At risk for amiodarone toxicity with long term use    RBBB    Epiretinal membrane    Central retinal vein occlusion    Nonexudative senile macular degeneration of retina    Malignant neoplasm of upper-outer quadrant of right female breast    Aromatase inhibitor use    Typical atrial flutter         Anesthesia Evaluation         Review of Systems      Physical Exam  General:  Well nourished    Airway/Jaw/Neck:  Airway Findings: Mouth Opening: Normal Tongue: Normal  General Airway Assessment: Adult  Mallampati: II  Improves to II with phonation.  TM Distance: Normal, at least 6 cm      Dental:  Dental Findings: In tact   Chest/Lungs:  Chest/Lungs Findings: Clear to auscultation     Heart/Vascular:  Heart Findings: Rate: Normal  Rhythm: Regular Rhythm  Sounds: Normal        Mental Status:  Mental Status Findings:  Cooperative, Alert and Oriented         Anesthesia Plan  Type of Anesthesia, risks & benefits discussed:  Anesthesia Type:  general  Patient's Preference: General  Intra-op Monitoring Plan: standard ASA monitors  Intra-op Monitoring Plan Comments: Standard ASA monitors.   Post Op Pain Control Plan: per primary service following discharge from PACU  Post Op Pain Control Plan Comments: Per primary service.     Induction:   IV  Beta  Blocker:  Patient is not currently on a Beta-Blocker (No further documentation required).       Informed Consent: Patient understands risks and agrees with Anesthesia plan.  Questions answered. Anesthesia consent signed with patient.  ASA Score: 4     Day of Surgery Review of History & Physical:    H&P update referred to the surgeon.     Anesthesia Plan Notes: Chart reviewed, patient interviewed and examined.  The plan for general anesthesia was explained.  Questions were answered and the consent was signed.  Kathie JIMENEZ         Ready For Surgery From Anesthesia Perspective.

## 2017-11-21 NOTE — PLAN OF CARE
Problem: Patient Care Overview  Goal: Plan of Care Review  Outcome: Ongoing (interventions implemented as appropriate)  Patient arrived to room. PIV placed. Admit assessment completed. Plan of care discussed with patient. Will monitor.

## 2017-11-21 NOTE — TRANSFER OF CARE
"Anesthesia Transfer of Care Note    Patient: Karena Young    Procedure(s) Performed: Procedure(s) (LRB):  ABLATION (N/A)    Patient location: PACU    Anesthesia Type: general    Transport from OR: Transported from OR on room air with adequate spontaneous ventilation    Post pain: adequate analgesia    Post assessment: no apparent anesthetic complications and tolerated procedure well    Post vital signs: stable    Level of consciousness: awake, alert and oriented    Nausea/Vomiting: no nausea/vomiting    Complications: none    Transfer of care protocol was followed      Last vitals:   Visit Vitals  BP (!) 150/66 (BP Location: Right arm, Patient Position: Lying)   Pulse 72   Temp 36.1 °C (97 °F) (Oral)   Resp 16   Ht 5' 4" (1.626 m)   Wt 55.8 kg (123 lb)   SpO2 98%   Breastfeeding? No   BMI 21.11 kg/m²     "

## 2017-11-21 NOTE — PROGRESS NOTES
Per daughter> patient took her eliquis dose this AM. Case discussed with MD Jake Hauser>  Per staff> proceed with procedure.

## 2017-11-21 NOTE — HPI
77 year old woman with PMH of CKD,  HOCM s/p ETOH ablation 4/06, ventricular tachycardia, ICD, HTN, orthostatic hypotension, and HLD. Also noted to have transient A Flutter, AF, and had VT > pt is currently on amiodarone.   ICD initially implanted by Dr Gonzales in 11/04 for syncope with VTNS. Pt seen by MD Hauser, with noted ICD shocks related to Atrial Flutter  and planned for RFA atrial flutter ablation. Pt denies any symptoms of chest pains, shortness of breath, bleeding, fevers chills.     EKG today reveals NSR at 62 BPM, 1st degree AV block.

## 2017-11-22 VITALS
RESPIRATION RATE: 18 BRPM | BODY MASS INDEX: 21 KG/M2 | DIASTOLIC BLOOD PRESSURE: 60 MMHG | HEART RATE: 64 BPM | OXYGEN SATURATION: 97 % | SYSTOLIC BLOOD PRESSURE: 118 MMHG | WEIGHT: 123 LBS | TEMPERATURE: 98 F | HEIGHT: 64 IN

## 2017-11-22 LAB — POCT GLUCOSE: 87 MG/DL (ref 70–110)

## 2017-11-22 PROCEDURE — 82962 GLUCOSE BLOOD TEST: CPT

## 2017-11-22 PROCEDURE — 99217 PR OBSERVATION CARE DISCHARGE: CPT | Mod: ,,, | Performed by: INTERNAL MEDICINE

## 2017-11-22 PROCEDURE — 25000003 PHARM REV CODE 250: Performed by: INTERNAL MEDICINE

## 2017-11-22 RX ADMIN — LETROZOLE 2.5 MG: 2.5 TABLET, FILM COATED ORAL at 09:11

## 2017-11-22 RX ADMIN — ATORVASTATIN CALCIUM 20 MG: 20 TABLET, FILM COATED ORAL at 09:11

## 2017-11-22 RX ADMIN — APIXABAN 5 MG: 2.5 TABLET, FILM COATED ORAL at 09:11

## 2017-11-22 RX ADMIN — AMIODARONE HYDROCHLORIDE 200 MG: 200 TABLET ORAL at 09:11

## 2017-11-22 RX ADMIN — LISINOPRIL 5 MG: 5 TABLET ORAL at 09:11

## 2017-11-22 RX ADMIN — METOPROLOL TARTRATE 100 MG: 100 TABLET ORAL at 09:11

## 2017-11-22 RX ADMIN — HYDROCHLOROTHIAZIDE 12.5 MG: 12.5 TABLET ORAL at 09:11

## 2017-11-22 NOTE — DISCHARGE SUMMARY
[facility]  Cardiac Electrophysiology  Discharge Summary      Patient Name: Karena Young  MRN: [id number]  Admission Date: 11/21/2017  Hospital Length of Stay: 0 days  Discharge Date and Time:  11/22/2017 9:31 AM  Attending Physician: Jake Hauser MD    Discharging Provider: Ana Morejon NP  Primary Care Physician: Lee Dwyer MD    HPI:   77 year old woman with PMH of CKD,  HOCM s/p ETOH ablation 4/06, ventricular tachycardia, ICD, HTN, orthostatic hypotension, and HLD. Also noted to have transient A Flutter, AF, and had VT > pt is currently on amiodarone.   ICD initially implanted by Dr Gonzales in 11/04 for syncope with VTNS. Pt seen by MD Hauser, with noted ICD shocks related to Atrial Flutter  and planned for RFA atrial flutter ablation. Pt denies any symptoms of chest pains, shortness of breath, bleeding, fevers chills.     EKG today reveals NSR at 62 BPM, 1st degree AV block.       Procedure(s) (LRB):  ABLATION (N/A)     Hospital Course: S/p AFL-RFA on 11/21/17 ( see procedure note for details). Tolerated procedure with no acute complications. No issues overnight. Tolerating diet, ambulating, voiding, no pain. Telemetry reviewed. Right groin soft with dressing clean and dry with no active bleeding; 2.5 cm circumferential stable hematoma noted; patient had not held Eliquis dose prior to procedure. Discharge plans/instructions discussed with patient and daughter  who verbalized understanding and agreement with plans of care.  No further questions or concerns voiced at this time.    Consults: Anesthesia    Significant Diagnostic Studies: Labs:   Pending Diagnostic Studies:     None          Final Active Diagnoses:    Diagnosis Date Noted POA    PRINCIPAL PROBLEM:  Atrial flutter [I48.92] 09/12/2012 Yes    Implantable cardioverter-defibrillator (ICD) in situ [Z95.810] 07/13/2012 Yes      Problems Resolved During this Admission:    Diagnosis Date Noted Date  Resolved POA     No new Assessment & Plan notes have been filed under this hospital service since the last note was generated.  Service: Arrhythmia      Discharged Condition: good    Disposition: Home or Self Care    Follow Up:  Follow-up Information     Jake Hauser MD In 6 weeks.    Specialties:  Electrophysiology, Cardiology  Why:  f/u post RFA-AFL in 6-8 weeks.   Contact information:  Sonu Ledesma  [facility] [place] [facility] [id number] [phone]                 Patient Instructions:     Diet Cardiac     Activity as tolerated     Lifting restrictions   Order Comments: Do not lift more than 5-10 pounds x 1 week     Call MD for:  temperature >100.4     Call MD for:  persistent nausea and vomiting or diarrhea     Call MD for:  severe uncontrolled pain     Call MD for:  redness, tenderness, or signs of infection (pain, swelling, redness, odor or green/yellow discharge around incision site)     Call MD for:  difficulty breathing or increased cough     Call MD for:  severe persistent headache     Call MD for:  worsening rash     Call MD for:  persistent dizziness, light-headedness, or visual disturbances     Call MD for:  increased confusion or weakness     Call MD for:   Order Comments: Any concerns regarding procedure     Change dressing (specify)   Order Comments: May remove groin dressing tonight. No tub baths or soaking in water x 3 days.       Medications:  Reconciled Home Medications:   Current Discharge Medication List      CONTINUE these medications which have NOT CHANGED    Details   amiodarone (PACERONE) 200 MG Tab Take 1 tablet (200 mg total) by mouth once daily.  Qty: 30 tablet, Refills: 11    Associated Diagnoses: Chronic atrial fibrillation      apixaban 5 mg Tab Take 1 tablet (5 mg total) by mouth 2 (two) times daily.  Qty: 60 tablet, Refills: 2      aspirin 81 mg Tab Take 1 tablet by mouth Daily.       atorvastatin (LIPITOR) 20 MG tablet TAKE 1 TABLET BY MOUTH EVERY DAY  Qty: 90 tablet, Refills: 2       B2/VITS A,C,E/LUT/ZEAXANTH/MIN (ICAPS ORAL) Take by mouth 2 (two) times daily.      calcium citrate-vitamin D3 315-200 mg (CITRACAL+D) 315-200 mg-unit per tablet Take 1 tablet by mouth 2 (two) times daily.        donepezil (ARICEPT) 10 MG tablet Take 1 tablet (10 mg total) by mouth every evening.  Qty: 30 tablet, Refills: 11      hydrochlorothiazide (MICROZIDE) 12.5 mg capsule TAKE 1 CAPSULE(12.5 MG) BY MOUTH EVERY DAY  Qty: 30 capsule, Refills: 6      letrozole (FEMARA) 2.5 mg Tab Take 1 tablet (2.5 mg total) by mouth once daily.  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 1 tablet Oral Every day  Qty: 30 tablet, Refills: 6    Associated Diagnoses: Orthostatic hypotension      metoprolol tartrate (LOPRESSOR) 100 MG tablet Take 1 tablet (100 mg total) by mouth 2 (two) times daily.  Qty: 60 tablet, Refills: 11      walker (ULTRA-LIGHT ROLLATOR) Misc 1 each by Misc.(Non-Drug; Combo Route) route daily as needed.  Refills: 0    Associated Diagnoses: Dyspnea on exertion; Cardiomyopathy      cholecalciferol, vitamin D3, 1,000 unit capsule Take 1 capsule (1,000 Units total) by mouth once daily.  Qty: 90 capsule, Refills: 3    Associated Diagnoses: Vitamin D deficiency           Ana Morejon NP  Cardiac Electrophysiology  [facility]

## 2017-11-22 NOTE — PLAN OF CARE
Problem: Patient Care Overview  Goal: Plan of Care Review  Outcome: Outcome(s) achieved Date Met: 11/22/17  Patient discharged per MD orders. Instructions given on medications, wound care, activity, signs of infection, when to call MD, and follow up appointments. Pt and patient's daughter verbalized understanding. Patient AAOx3, VSS, no c/o pain or discomfort at this time. Gauze dressing to right groin is c/d/i. No active bleeding noted. Telemetry and PIV removed. Patient was discharged home via wheelchair with escort accompanied by her daughter..

## 2017-11-22 NOTE — PLAN OF CARE
Problem: Patient Care Overview  Goal: Plan of Care Review  Outcome: Ongoing (interventions implemented as appropriate)  Pt is aaox3. Vss. resp even and unlabored. Bed locked and in low position. Side rails raised x 2. Daughter at bedside. Nurse call bell within reach. Pt denies any complaints at this time. Will continue to monitor.

## 2017-11-22 NOTE — NURSING TRANSFER
Nursing Transfer Note      11/21/2017     Transfer To: [id number]    Transfer via stretcher    Transfer with cardiac monitoring    Transported by RN x2    Medicines sent: silvadine cream    Chart send with patient: Yes    Notified: daughter

## 2017-11-22 NOTE — NURSING
Received from PACU, transferred to bed by staff. AA+O X 4. MAEW. R groin dressing CDI, with palpable pulse. VS stable, telemetry is NSR. Family and food at bedside. VS stable

## 2017-11-22 NOTE — ANESTHESIA POSTPROCEDURE EVALUATION
"Anesthesia Post Evaluation    Patient: Karena Young    Procedure(s) Performed: Procedure(s) (LRB):  ABLATION (N/A)    Final Anesthesia Type: MAC  Patient location during evaluation: PACU  Patient participation: Yes- Able to Participate  Level of consciousness: awake and alert  Post-procedure vital signs: reviewed and stable  Pain management: adequate  Airway patency: patent  PONV status at discharge: No PONV  Anesthetic complications: no      Cardiovascular status: hemodynamically stable  Respiratory status: unassisted  Hydration status: euvolemic  Follow-up not needed.        Visit Vitals  BP (!) 107/53 (BP Location: Left arm, Patient Position: Lying)   Pulse 60   Temp 36.6 °C (97.9 °F) (Oral)   Resp 18   Ht 5' 4" (1.626 m)   Wt 55.8 kg (123 lb)   SpO2 99%   Breastfeeding? No   BMI 21.11 kg/m²       Pain/Wendy Score: Pain Assessment Performed: Yes (11/21/2017  7:42 PM)  Presence of Pain: denies (11/22/2017  5:05 AM)  Wendy Score: 9 (11/21/2017  7:00 PM)      "

## 2017-12-04 ENCOUNTER — OFFICE VISIT (OUTPATIENT)
Dept: URGENT CARE | Facility: CLINIC | Age: 77
End: 2017-12-04
Payer: MEDICARE

## 2017-12-04 VITALS
OXYGEN SATURATION: 97 % | SYSTOLIC BLOOD PRESSURE: 116 MMHG | HEART RATE: 78 BPM | TEMPERATURE: 98 F | DIASTOLIC BLOOD PRESSURE: 60 MMHG | HEIGHT: 65 IN | BODY MASS INDEX: 20.49 KG/M2 | WEIGHT: 123 LBS

## 2017-12-04 DIAGNOSIS — S50.12XA CONTUSION OF LEFT FOREARM, INITIAL ENCOUNTER: Primary | ICD-10-CM

## 2017-12-04 PROCEDURE — 99214 OFFICE O/P EST MOD 30 MIN: CPT | Mod: S$GLB,,, | Performed by: NURSE PRACTITIONER

## 2017-12-04 PROCEDURE — 99999 PR PBB SHADOW E&M-EST. PATIENT-LVL III: CPT | Mod: PBBFAC,,, | Performed by: NURSE PRACTITIONER

## 2017-12-04 NOTE — PROGRESS NOTES
Subjective:       Patient ID: Karena Young is a 77 y.o. female.    Chief Complaint: Shoulder Injury (left upper arm bruise)    78 yo female presents to Urgent Care with reports of bruise to left upper arm. She does not recall how it happened but did not have a fall. She is accompanied by her sister. She denies any pain or swelling. She is on eliquis daily for A-fib. She reports bruise is starting to turn yellow and appears to be healing. She denies putting any compresses as she does not know what to put on it. She denies any break in skin or active bleeding. Denies any other associated symptoms.      Review of Systems   Constitutional: Negative for activity change, fatigue and fever.   Respiratory: Negative for shortness of breath, wheezing and stridor.    Cardiovascular: Negative for chest pain, palpitations and leg swelling.   Musculoskeletal: Negative for gait problem, joint swelling and myalgias.   Skin: Negative for color change and rash.   Allergic/Immunologic: Negative for environmental allergies.   Neurological: Negative for dizziness, tremors, seizures, syncope, facial asymmetry, speech difficulty, weakness, light-headedness, numbness and headaches.   Hematological: Bruises/bleeds easily.   Psychiatric/Behavioral: Negative for agitation.       Objective:      Physical Exam   Constitutional: She is oriented to person, place, and time. She appears well-developed and well-nourished.   HENT:   Head: Normocephalic.   Cardiovascular: Normal rate.    Pulmonary/Chest: Effort normal and breath sounds normal.   Musculoskeletal:        Left shoulder: She exhibits normal range of motion, no tenderness, no bony tenderness, no swelling, no effusion, no crepitus, no deformity, no laceration, no pain, no spasm, normal pulse and normal strength.        Arms:  Neurological: She is alert and oriented to person, place, and time. She has normal strength and normal reflexes. No cranial nerve deficit or sensory deficit.  "Coordination normal. GCS eye subscore is 4. GCS verbal subscore is 5. GCS motor subscore is 6.   Skin: Skin is warm.   Psychiatric: She has a normal mood and affect.   Nursing note and vitals reviewed.      Assessment:       1. Contusion of left forearm, initial encounter        Plan:         Karena was seen today for shoulder injury.    Diagnoses and all orders for this visit:    Contusion of left forearm, initial encounter  Comments:  Report to ER if area becomes bigger or swelling/pain occurs.     Advised patient and sister to monitor area for warning signs discussed in office and report to ER if worsens. Denies blood work in office today stating "I just had blood work in November".Patient and sister agreed. Follow up with PCP in 24 hours or sooner. Discharged in stable condition with AVS in hand.   "

## 2017-12-04 NOTE — PATIENT INSTRUCTIONS
Bruises (Contusions)    A contusion is a bruise. A bruise happens when a blow to your body doesn't break the skin but does break blood vessels beneath the skin. Blood leaking from the broken vessels causes redness and swelling. As it heals, your bruise is likely to turn colors like purple, green, and yellow. This is normal. The bruise should fade in 2 or 3 weeks.  Factors that make you more likely to bruise  Almost everyone bruises now and then. Certain people do bruise more easily than others. You're more prone to bruising as you get older. That's because blood vessels become more fragile with age. You're also more likely to bruise if you have a clotting disorder such as hemophilia or take medications that reduce clotting, including aspirin, coumadin, newer agents.  When to go to the emergency room (ER)  Bruises almost always heal on their own without special treatment. But for some people, a bad bruise can be serious. Seek medical care if you:  · Have a clotting disorder such as hemophilia.  · Have cirrhosis or other serious liver disease.  · Take blood-thinning medications such as warfarin (Coumadin).  What to expect in the ER  A doctor will examine your bruise and ask about any health conditions you have. In some cases, you may have a test to check how well your blood clots. Other treatment will depend on your needs.  Follow-up care  Sometimes a bruise gets worse instead of better. It may become larger and more swollen. This can occur when your body walls off a small pool of blood under the skin (hematoma). In very rare cases, your doctor may need to drain excess blood from the area.  Tip:  Apply an ice pack or bag of frozen peas to a bruise (keep a thin cloth between the cold source and your skin). This can help reduce redness and swelling.   Date Last Reviewed: 12/1/2016  © 6154-3205 [employer]. [address] [place] [address], [place] [address]. All rights reserved. This information is not intended as  a substitute for professional medical care. Always follow your healthcare professional's instructions.        Bruises (Contusions)    A contusion is a bruise. A bruise happens when a blow to your body doesn't break the skin but does break blood vessels beneath the skin. Blood leaking from the broken vessels causes redness and swelling. As it heals, your bruise is likely to turn colors like purple, green, and yellow. This is normal. The bruise should fade in 2 or 3 weeks.  Factors that make you more likely to bruise  Almost everyone bruises now and then. Certain people do bruise more easily than others. You're more prone to bruising as you get older. That's because blood vessels become more fragile with age. You're also more likely to bruise if you have a clotting disorder such as hemophilia or take medications that reduce clotting, including aspirin, coumadin, newer agents.  When to go to the emergency room (ER)  Bruises almost always heal on their own without special treatment. But for some people, a bad bruise can be serious. Seek medical care if you:  · Have a clotting disorder such as hemophilia.  · Have cirrhosis or other serious liver disease.  · Take blood-thinning medications such as warfarin (Coumadin).  What to expect in the ER  A doctor will examine your bruise and ask about any health conditions you have. In some cases, you may have a test to check how well your blood clots. Other treatment will depend on your needs.  Follow-up care  Sometimes a bruise gets worse instead of better. It may become larger and more swollen. This can occur when your body walls off a small pool of blood under the skin (hematoma). In very rare cases, your doctor may need to drain excess blood from the area.  Tip:  Apply an ice pack or bag of frozen peas to a bruise (keep a thin cloth between the cold source and your skin). This can help reduce redness and swelling.   Date Last Reviewed: 12/1/2016  © 7402-2226 [employer]. [address] [place] [address], [place] [address]. All rights reserved. This information is not intended as a substitute for professional medical care. Always follow your healthcare professional's instructions.

## 2017-12-04 NOTE — PROGRESS NOTES
Subjective:       Patient ID: Karena Young is a 77 y.o. female.    Chief Complaint: Arm Pain    HPI  Review of Systems    Objective:      Physical Exam    Assessment:       1. Contusion of left forearm, initial encounter        Plan:       ***

## 2017-12-17 DIAGNOSIS — C50.411 MALIGNANT NEOPLASM OF UPPER-OUTER QUADRANT OF RIGHT BREAST IN FEMALE, ESTROGEN RECEPTOR POSITIVE: ICD-10-CM

## 2017-12-17 DIAGNOSIS — Z79.811 AROMATASE INHIBITOR USE: ICD-10-CM

## 2017-12-17 DIAGNOSIS — Z17.0 MALIGNANT NEOPLASM OF UPPER-OUTER QUADRANT OF RIGHT BREAST IN FEMALE, ESTROGEN RECEPTOR POSITIVE: ICD-10-CM

## 2017-12-18 ENCOUNTER — OFFICE VISIT (OUTPATIENT)
Dept: URGENT CARE | Facility: CLINIC | Age: 77
End: 2017-12-18
Payer: MEDICARE

## 2017-12-18 ENCOUNTER — APPOINTMENT (OUTPATIENT)
Dept: RADIOLOGY | Facility: HOSPITAL | Age: 77
End: 2017-12-18
Attending: NURSE PRACTITIONER
Payer: MEDICARE

## 2017-12-18 VITALS
HEIGHT: 65 IN | SYSTOLIC BLOOD PRESSURE: 127 MMHG | TEMPERATURE: 98 F | HEART RATE: 67 BPM | BODY MASS INDEX: 20.49 KG/M2 | DIASTOLIC BLOOD PRESSURE: 65 MMHG | OXYGEN SATURATION: 98 % | WEIGHT: 123 LBS

## 2017-12-18 DIAGNOSIS — M25.551 RIGHT HIP PAIN: ICD-10-CM

## 2017-12-18 DIAGNOSIS — M25.551 RIGHT HIP PAIN: Primary | ICD-10-CM

## 2017-12-18 PROCEDURE — 99999 PR PBB SHADOW E&M-EST. PATIENT-LVL IV: CPT | Mod: PBBFAC,,, | Performed by: NURSE PRACTITIONER

## 2017-12-18 PROCEDURE — 99214 OFFICE O/P EST MOD 30 MIN: CPT | Mod: S$GLB,,, | Performed by: NURSE PRACTITIONER

## 2017-12-18 PROCEDURE — 73502 X-RAY EXAM HIP UNI 2-3 VIEWS: CPT | Mod: TC,PO,RT

## 2017-12-18 PROCEDURE — 73502 X-RAY EXAM HIP UNI 2-3 VIEWS: CPT | Mod: 26,RT,, | Performed by: RADIOLOGY

## 2017-12-18 RX ORDER — DICLOFENAC SODIUM 30 MG/G
GEL TOPICAL
Qty: 100 G | Refills: 0 | Status: SHIPPED | OUTPATIENT
Start: 2017-12-18 | End: 2018-07-17 | Stop reason: ALTCHOICE

## 2017-12-18 RX ORDER — LETROZOLE 2.5 MG/1
TABLET, FILM COATED ORAL
Qty: 30 TABLET | Refills: 5 | Status: SHIPPED | OUTPATIENT
Start: 2017-12-18 | End: 2018-06-25 | Stop reason: SDUPTHER

## 2017-12-18 RX ORDER — HYDROCODONE BITARTRATE AND ACETAMINOPHEN 7.5; 325 MG/1; MG/1
1 TABLET ORAL EVERY 4 HOURS PRN
Qty: 15 TABLET | Refills: 0 | Status: SHIPPED | OUTPATIENT
Start: 2017-12-18 | End: 2018-01-05

## 2017-12-18 NOTE — PROGRESS NOTES
Subjective:       Patient ID: Karena Young is a 77 y.o. female.    Chief Complaint: Hip Pain    Hip Pain    Incident onset: 2 days. There was no injury mechanism. Pain location: right hip. The quality of the pain is described as aching. The pain is at a severity of 4/10. The pain is mild. The pain has been constant since onset. Pertinent negatives include no inability to bear weight, loss of motion, loss of sensation, muscle weakness, numbness or tingling. She reports no foreign bodies present. The symptoms are aggravated by movement. She has tried acetaminophen for the symptoms. The treatment provided no relief.     Review of Systems   Constitutional: Negative for fatigue and fever.   HENT:        [place]   Musculoskeletal: Positive for arthralgias. Negative for gait problem and joint swelling.   Allergic/Immunologic: Negative for environmental allergies.   Neurological: Negative for dizziness, tingling, seizures and numbness.   Psychiatric/Behavioral: Negative for agitation.       Objective:      Physical Exam   Constitutional: She is oriented to person, place, and time. She appears well-developed and well-nourished.   Musculoskeletal:        Right hip: She exhibits tenderness. She exhibits normal range of motion, normal strength, no bony tenderness, no swelling, no crepitus, no deformity and no laceration.   Neurological: She is alert and oriented to person, place, and time.   Skin: Skin is warm.   Psychiatric: She has a normal mood and affect.   Nursing note and vitals reviewed.      Assessment:       1. Right hip pain        Plan:         Karena was seen today for hip pain.    Diagnoses and all orders for this visit:    Right hip pain  -     X-Ray Hip 2 View Right; Future  -     hydrocodone-acetaminophen 7.5-325mg (NORCO) 7.5-325 mg per tablet; Take 1 tablet by mouth every 4 (four) hours as needed for Pain.  -     diclofenac sodium (SOLARAZE) 3 % gel; Apply to right hip as needed for pain daily    Xray  WNL. Family demanding stronger medication for patient pain. Will give norco with warning of side effects including drowsiness which if not supervised could lead to a fall. Family agreed with monitoring patient for injury and plan of care. Discussed if symptoms worsen to follow up with PCP or ER. Discharged in stable condition with AVS.  Follow prescribed treatment plan as directed.  Stay hydrated and rest.  Report to ER if symptoms worsen.  Follow up with PCP in 2-3 days or sooner if symptoms do not improve.

## 2017-12-18 NOTE — PATIENT INSTRUCTIONS
Hip Strain    You have a strain of the muscles around the hip joint. A muscle strain is a stretching or tearing of muscle fibers. This causes pain, especially when you move that muscle. There may also be some swelling and bruising.  Home care  · Stay off the injured leg as much as possible until you can walk on it without pain. If you have a lot of pain with walking, crutches or a walker may be prescribed. These can be rented or purchased at many pharmacies and surgical or orthopedic supply stores. Follow your healthcare provider's advice regarding when to begin putting weight on that leg.  · Apply an ice pack over the injured area for 15 to 20 minutes every 3 to 6 hours. You should do this for the first 24 to 48 hours. You can make an ice pack by filling a plastic bag that seals at the top with ice cubes and then wrapping it with a thin towel. Be careful not to injure your skin with the ice treatments. Ice should never be applied directly to skin. Continue the use of ice packs for relief of pain and swelling as needed. After 48 hours, apply heat (warm shower or warm bath) for 15 to 20 minutes several times a day, or alternate ice and heat.  · You may use over-the-counter pain medicine to control pain, unless another pain medicine was prescribed. If you have chronic liver or kidney disease or ever had a stomach ulcer or GI bleeding, talk with your healthcare provider beforeusing these medicines.  · If you play sports, you may resume these activities when you are able to hop and run on the injured leg without pain.  Follow-up care  Follow up with your healthcare provider, or as advised. If your symptoms do not begin to get better after a week, more tests may be needed.  If X-rays were taken, you will be told of any new findings that may affect your care.  When to seek medical advice  Call your healthcare provider right away if any of these occur:  · Increased swelling or bruising  · Increased pain  · Losing the  ability to put weight on the injured side  Date Last Reviewed: 11/19/2015  © 4651-9405 [employer]. [address] [place] [address], [place] [address]. All rights reserved. This information is not intended as a substitute for professional medical care. Always follow your healthcare professional's instructions.

## 2017-12-21 ENCOUNTER — HOSPITAL ENCOUNTER (EMERGENCY)
Facility: HOSPITAL | Age: 77
Discharge: HOME | End: 2017-12-21
Attending: EMERGENCY MEDICINE
Payer: MEDICARE

## 2017-12-21 ENCOUNTER — OFFICE VISIT (OUTPATIENT)
Dept: URGENT CARE | Facility: CLINIC | Age: 77
End: 2017-12-21
Payer: MEDICARE

## 2017-12-21 VITALS
DIASTOLIC BLOOD PRESSURE: 50 MMHG | OXYGEN SATURATION: 96 % | WEIGHT: 116 LBS | TEMPERATURE: 98 F | BODY MASS INDEX: 19.81 KG/M2 | HEART RATE: 66 BPM | HEIGHT: 64 IN | SYSTOLIC BLOOD PRESSURE: 80 MMHG

## 2017-12-21 VITALS
BODY MASS INDEX: 19.81 KG/M2 | OXYGEN SATURATION: 98 % | WEIGHT: 116 LBS | HEIGHT: 64 IN | TEMPERATURE: 98 F | DIASTOLIC BLOOD PRESSURE: 62 MMHG | RESPIRATION RATE: 20 BRPM | HEART RATE: 60 BPM | SYSTOLIC BLOOD PRESSURE: 128 MMHG

## 2017-12-21 DIAGNOSIS — M16.10 HIP ARTHRITIS: ICD-10-CM

## 2017-12-21 DIAGNOSIS — R06.00 DYSPNEA, UNSPECIFIED TYPE: Primary | ICD-10-CM

## 2017-12-21 DIAGNOSIS — R06.02 SOB (SHORTNESS OF BREATH): ICD-10-CM

## 2017-12-21 DIAGNOSIS — R06.02 SHORTNESS OF BREATH: Primary | ICD-10-CM

## 2017-12-21 DIAGNOSIS — R53.1 WEAKNESS: ICD-10-CM

## 2017-12-21 DIAGNOSIS — R52 PAIN: ICD-10-CM

## 2017-12-21 DIAGNOSIS — R06.02 SHORTNESS OF BREATH: ICD-10-CM

## 2017-12-21 LAB
ALBUMIN SERPL BCP-MCNC: 3.6 G/DL
ALP SERPL-CCNC: 64 U/L
ALT SERPL W/O P-5'-P-CCNC: 21 U/L
ANION GAP SERPL CALC-SCNC: 15 MMOL/L
AST SERPL-CCNC: 29 U/L
BASOPHILS # BLD AUTO: 0.02 K/UL
BASOPHILS NFR BLD: 0.2 %
BILIRUB SERPL-MCNC: 0.7 MG/DL
BNP SERPL-MCNC: 77 PG/ML
BUN SERPL-MCNC: 35 MG/DL
CALCIUM SERPL-MCNC: 9.9 MG/DL
CHLORIDE SERPL-SCNC: 96 MMOL/L
CO2 SERPL-SCNC: 28 MMOL/L
CREAT SERPL-MCNC: 1.4 MG/DL
DIFFERENTIAL METHOD: ABNORMAL
EOSINOPHIL # BLD AUTO: 0.1 K/UL
EOSINOPHIL NFR BLD: 0.8 %
ERYTHROCYTE [DISTWIDTH] IN BLOOD BY AUTOMATED COUNT: 12.8 %
EST. GFR  (AFRICAN AMERICAN): 42 ML/MIN/1.73 M^2
EST. GFR  (NON AFRICAN AMERICAN): 36 ML/MIN/1.73 M^2
GLUCOSE SERPL-MCNC: 94 MG/DL
HCT VFR BLD AUTO: 40.4 %
HGB BLD-MCNC: 13.9 G/DL
INR PPP: 1
LYMPHOCYTES # BLD AUTO: 0.7 K/UL
LYMPHOCYTES NFR BLD: 8.1 %
MCH RBC QN AUTO: 31.2 PG
MCHC RBC AUTO-ENTMCNC: 34.4 G/DL
MCV RBC AUTO: 91 FL
MONOCYTES # BLD AUTO: 0.9 K/UL
MONOCYTES NFR BLD: 10.1 %
NEUTROPHILS # BLD AUTO: 7.2 K/UL
NEUTROPHILS NFR BLD: 80.8 %
PLATELET # BLD AUTO: 172 K/UL
PMV BLD AUTO: 10.5 FL
POTASSIUM SERPL-SCNC: 3.6 MMOL/L
PROT SERPL-MCNC: 6.6 G/DL
PROTHROMBIN TIME: 10.2 SEC
RBC # BLD AUTO: 4.45 M/UL
SODIUM SERPL-SCNC: 139 MMOL/L
TROPONIN I SERPL DL<=0.01 NG/ML-MCNC: 0.03 NG/ML
WBC # BLD AUTO: 8.97 K/UL

## 2017-12-21 PROCEDURE — 85610 PROTHROMBIN TIME: CPT

## 2017-12-21 PROCEDURE — 85025 COMPLETE CBC W/AUTO DIFF WBC: CPT

## 2017-12-21 PROCEDURE — 99999 PR PBB SHADOW E&M-EST. PATIENT-LVL IV: CPT | Mod: PBBFAC,,, | Performed by: NURSE PRACTITIONER

## 2017-12-21 PROCEDURE — 84484 ASSAY OF TROPONIN QUANT: CPT

## 2017-12-21 PROCEDURE — 83880 ASSAY OF NATRIURETIC PEPTIDE: CPT

## 2017-12-21 PROCEDURE — 99284 EMERGENCY DEPT VISIT MOD MDM: CPT | Mod: 25

## 2017-12-21 PROCEDURE — 93005 ELECTROCARDIOGRAM TRACING: CPT

## 2017-12-21 PROCEDURE — 93010 ELECTROCARDIOGRAM REPORT: CPT | Mod: ,,, | Performed by: INTERNAL MEDICINE

## 2017-12-21 PROCEDURE — 99212 OFFICE O/P EST SF 10 MIN: CPT | Mod: S$GLB,,, | Performed by: NURSE PRACTITIONER

## 2017-12-21 PROCEDURE — 80053 COMPREHEN METABOLIC PANEL: CPT

## 2017-12-21 RX ORDER — SODIUM CHLORIDE 9 MG/ML
INJECTION, SOLUTION INTRAVENOUS
Status: DISCONTINUED | OUTPATIENT
Start: 2017-12-21 | End: 2017-12-21

## 2017-12-21 RX ORDER — TRAMADOL HYDROCHLORIDE 50 MG/1
50 TABLET ORAL EVERY 6 HOURS PRN
Qty: 12 TABLET | Refills: 0 | Status: SHIPPED | OUTPATIENT
Start: 2017-12-21 | End: 2017-12-31

## 2017-12-21 NOTE — PROGRESS NOTES
Subjective:       Patient ID: Karena Young is a 77 y.o. female.    Chief Complaint: Shortness of Breath    77 year old female present to Urgent Care with daughter reporting shortness of breath and weakness that has been present for about 3 days. Patients vitals were: BP 80/50, HR 66 and Pulse Ox 96 percent on room air.      Fatigue   Associated symptoms include fatigue and weakness. Pertinent negatives include no abdominal pain, chest pain, chills, fever, nausea, numbness, rash, sore throat or vomiting. Associated symptoms comments: Shortness of breath.. Nothing aggravates the symptoms. She has tried nothing for the symptoms.     Review of Systems   Constitutional: Positive for activity change, appetite change and fatigue. Negative for chills, fever and unexpected weight change.   HENT: Negative for ear pain, sinus pressure, sore throat and trouble swallowing.    Eyes: Negative for visual disturbance.   Respiratory: Positive for shortness of breath.    Cardiovascular: Negative for chest pain.   Gastrointestinal: Negative for abdominal pain, diarrhea, nausea and vomiting.   Endocrine: Negative for cold intolerance, polyphagia and polyuria.   Genitourinary: Negative for decreased urine volume and dysuria.   Musculoskeletal: Negative for back pain.   Skin: Negative for rash.   Allergic/Immunologic: Negative for environmental allergies and food allergies.   Neurological: Positive for dizziness and weakness. Negative for tremors and numbness.   Hematological: Does not bruise/bleed easily.   Psychiatric/Behavioral: Negative for confusion and hallucinations. The patient is not nervous/anxious and is not hyperactive.    All other systems reviewed and are negative.      Objective:     Physical Exam   Constitutional: She is oriented to person, place, and time. She appears well-developed and well-nourished. She appears ill.   HENT:   Head: Normocephalic and atraumatic.   Right Ear: External ear normal.   Left Ear:  External ear normal.   Nose: Nose normal.   Mouth/Throat: Oropharynx is clear and moist.   Eyes: Conjunctivae and EOM are normal. Pupils are equal, round, and reactive to light.   Neck: Normal range of motion. Neck supple.   Cardiovascular: Normal rate, regular rhythm, normal heart sounds and intact distal pulses.    No murmur heard.  HR: 66    Pulmonary/Chest: Effort normal and breath sounds normal. She has no wheezes.   Abdominal: Soft. Bowel sounds are normal. There is no tenderness.   Musculoskeletal: Normal range of motion.   Neurological: She is alert and oriented to person, place, and time. She has normal reflexes.   Skin: Skin is warm and dry. No rash noted.   Psychiatric: She has a normal mood and affect. Her behavior is normal. Judgment and thought content normal.   Nursing note and vitals reviewed.  Recommended that patient be seen in ER for evaluation of Shortness of Breath at rest and weakness. Daughter transporting patient to ER at [facility] on [address]. Report given to Dianne.   Assessment:     1. Shortness of breath    2. Weakness      Plan:   Karena was seen today for shortness of breath.    Diagnoses and all orders for this visit:    Shortness of breath  -     X-Ray Chest PA And Lateral; Future    Weakness  -     Cancel: POCT URINE DIPSTICK WITHOUT MICROSCOPE    Other orders  -     Discontinue: 0.9%  NaCl infusion; Inject into the vein one time.

## 2017-12-21 NOTE — ED PROVIDER NOTES
SCRIBE #1 NOTE: I, Andriy Islas, am scribing for, and in the presence of, Tulio Patel Jr., MD. I have scribed the entire note.      History      Chief Complaint   Patient presents with    Shortness of Breath     patient c/o SOB and fatigue, patient also c/o right hip pain        Review of patient's allergies indicates:  No Known Allergies     HPI   HPI    12/21/2017, 2:31 PM   History obtained from the patient and daughter      History of Present Illness: Karena Yougn is a 77 y.o. female patient with a PMHx of AFIB, who presents to the Emergency Department for SOB which onset gradually 3 days ago. Sxs are constant, worsening, and moderate in severity. Daughter reports pt is chronically SOB but sxs have been worsening this week. Pt was seen at [facility] in [place] earlier this week for right hip strain. Daughter reports pt fell today onto her left side 2 days ago and has a bruise to left shoulder. Pt denies any pain to her left shoulder. There are no mitigating or exacerbating factors noted. Associated sxs include fatigue.  Pt denies any fever, N/V/D, CP, head trauma, numbness, leg swelling, calf pain, cough, dizziness, and all other sxs at this time. Pt is currently taking ELIQUIS. No further complaints or concerns at this time.       Arrival mode: Personal vehicle     PCP: Lee Dwyer MD       Past Medical History:  Past Medical History:   Diagnosis Date    *Atrial fibrillation     *Atrial flutter     Breast cancer     Cardiomyopathy     HOCM (hypertrophic obstructive cardiomyopathy) 9/6/2013    Hyperlipidemia     Hypertension     Macular degeneration     Orthostatic hypotension     Ventricular tachycardia        Past Surgical History:  Past Surgical History:   Procedure Laterality Date    BREAST BIOPSY      CARDIAC ELECTROPHYSIOLOGY STUDY AND ABLATION  4/06    CATARACT EXTRACTION      ICD implantation      INSERT / REPLACE / REMOVE PACEMAKER           Family History:  Family  History   Problem Relation Age of Onset    Cancer Mother     Heart disease Mother     Heart disease Father     Cancer Brother 60       Social History:  Social History     Social History Main Topics    Smoking status: Never Smoker    Smokeless tobacco: Never Used    Alcohol use No    Drug use: No    Sexual activity: Not Currently     Partners: Male       ROS   Review of Systems   Constitutional: Positive for fatigue. Negative for fever.   HENT: Negative for sore throat.    Respiratory: Positive for shortness of breath. Negative for cough.    Cardiovascular: Negative for chest pain.   Gastrointestinal: Negative for abdominal pain, constipation, diarrhea, nausea and vomiting.   Genitourinary: Negative for dysuria.   Musculoskeletal: Negative for back pain.   Skin: Negative for rash.   Neurological: Negative for dizziness, syncope, weakness and headaches.   Hematological: Does not bruise/bleed easily.       Physical Exam      Initial Vitals [12/21/17 1404]   BP Pulse Resp Temp SpO2   (!) 106/56 63 16 97.9 °F (36.6 °C) 97 %      MAP       72.67          Physical Exam  Nursing Notes and Vital Signs Reviewed.  Constitutional: Patient is in no acute distress. Well-developed and well-nourished.  Head: Atraumatic. Normocephalic.  Eyes: PERRL. EOM intact. Conjunctivae are not pale. No scleral icterus.  ENT: Mucous membranes are moist. Oropharynx is clear and symmetric.    Neck: Supple. Full ROM. No lymphadenopathy.  Cardiovascular: Regular rate. Regular rhythm. No murmurs, rubs, or gallops. Distal pulses are 2+ and symmetric.  Pulmonary/Chest: No respiratory distress. Clear to auscultation bilaterally. No wheezing or rales.  Abdominal: Soft and non-distended.  There is no tenderness.  No rebound, guarding, or rigidity. Good bowel sounds.  Genitourinary: No CVA tenderness  Musculoskeletal: Moves all extremities. No obvious deformities. No edema. No calf tenderness.  Skin: Warm and dry.  Neurological:  Alert, awake, and  "appropriate.  Normal speech.  No acute focal neurological deficits are appreciated.  Psychiatric: Normal affect. Good eye contact. Appropriate in content.    ED Course    Procedures  ED Vital Signs:  Vitals:    12/21/17 1404 12/21/17 1432 12/21/17 1545   BP: (!) 106/56 (!) 148/72 128/60   Pulse: 63 60 60   Resp: 16 14 18   Temp: 97.9 °F (36.6 °C)     SpO2: 97% 98% 97%   Weight: 52.6 kg (116 lb)     Height: 5' 4" (1.626 m)         Abnormal Lab Results:  Labs Reviewed   CBC W/ AUTO DIFFERENTIAL - Abnormal; Notable for the following:        Result Value    MCH 31.2 (*)     Lymph # 0.7 (*)     Gran% 80.8 (*)     Lymph% 8.1 (*)     All other components within normal limits   COMPREHENSIVE METABOLIC PANEL - Abnormal; Notable for the following:     BUN, Bld 35 (*)     eGFR if  42 (*)     eGFR if non  36 (*)     All other components within normal limits   TROPONIN I - Abnormal; Notable for the following:     Troponin I 0.028 (*)     All other components within normal limits   B-TYPE NATRIURETIC PEPTIDE   PROTIME-INR        All Lab Results:  Results for orders placed or performed during the hospital encounter of 12/21/17   CBC auto differential   Result Value Ref Range    WBC 8.97 3.90 - 12.70 K/uL    RBC 4.45 4.00 - 5.40 M/uL    Hemoglobin 13.9 12.0 - 16.0 g/dL    Hematocrit 40.4 37.0 - 48.5 %    MCV 91 82 - 98 fL    MCH 31.2 (H) 27.0 - 31.0 pg    MCHC 34.4 32.0 - 36.0 g/dL    RDW 12.8 11.5 - 14.5 %    Platelets 172 150 - 350 K/uL    MPV 10.5 9.2 - 12.9 fL    Gran # 7.2 1.8 - 7.7 K/uL    Lymph # 0.7 (L) 1.0 - 4.8 K/uL    Mono # 0.9 0.3 - 1.0 K/uL    Eos # 0.1 0.0 - 0.5 K/uL    Baso # 0.02 0.00 - 0.20 K/uL    Gran% 80.8 (H) 38.0 - 73.0 %    Lymph% 8.1 (L) 18.0 - 48.0 %    Mono% 10.1 4.0 - 15.0 %    Eosinophil% 0.8 0.0 - 8.0 %    Basophil% 0.2 0.0 - 1.9 %    Differential Method Automated    Comprehensive metabolic panel   Result Value Ref Range    Sodium 139 136 - 145 mmol/L    Potassium 3.6 " 3.5 - 5.1 mmol/L    Chloride 96 95 - 110 mmol/L    CO2 28 23 - 29 mmol/L    Glucose 94 70 - 110 mg/dL    BUN, Bld 35 (H) 8 - 23 mg/dL    Creatinine 1.4 0.5 - 1.4 mg/dL    Calcium 9.9 8.7 - 10.5 mg/dL    Total Protein 6.6 6.0 - 8.4 g/dL    Albumin 3.6 3.5 - 5.2 g/dL    Total Bilirubin 0.7 0.1 - 1.0 mg/dL    Alkaline Phosphatase 64 55 - 135 U/L    AST 29 10 - 40 U/L    ALT 21 10 - 44 U/L    Anion Gap 15 8 - 16 mmol/L    eGFR if African American 42 (A) >60 mL/min/1.73 m^2    eGFR if non African American 36 (A) >60 mL/min/1.73 m^2   Troponin I   Result Value Ref Range    Troponin I 0.028 (H) 0.000 - 0.026 ng/mL   Brain natriuretic peptide   Result Value Ref Range    BNP 77 0 - 99 pg/mL   Protime-INR   Result Value Ref Range    Prothrombin Time 10.2 9.0 - 12.5 sec    INR 1.0 0.8 - 1.2         Imaging Results:  Imaging Results          X-Ray Chest AP Portable (Final result)  Result time 17 15:25:43    Final result by Sharyn Luna MD (17 15:25:43)                 Impression:     No acute cardiopulmonary disease.      Electronically signed by: SHARYN LUNA MD  Date:     17  Time:    15:25              Narrative:    Exam: Portable chest xray    History:    Congestive heart failure.  Shortness of breath.    Findings:     The heart is normal and the lungs are clear.  Aortic atherosclerosis.  Pacemaker left side.                             [facility] [address]  [place] [address]        Radiology Result      Name:   :  Patient MRN:   Karena Young 1940 [id number]   Account Number: Room & Bed Accession Number:   [id number]   [id number]   Authorizing Physician: Patient Class: Diagnosis:   Venecia Francisco OP- Outpatient Diagnostic Testing Right hip pain [M25.551 (ICD-10-CM)]   Procedure:  Exam Date: Reason for Exam:   X-Ray Hip 2 View Right 2017 right hip pain          RESULTS:  Technique: 2 views were obtained of the right hip.  AP view of the pelvis was  included.     Comparison: none     Findings: There is no radiographic evidence of acute osseous, articular, or soft tissue abnormality. Hip joint spaces are well preserved. Degenerative findings noted in the visualized lower lumbar spine.  There is generalized osteopenia.  IMPRESSION:      As above. No acute findings.        Electronically signed by: COSMO TO MD  Date:                                            12/18/17  Time:                                           09:30           Signed By:  Cosmo To MD on 12/18/2017  9:30 AM                       The EKG was ordered, reviewed, and independently interpreted by the ED provider.  Interpretation time: 14:29  Rate: 60 BPM  Rhythm: normal sinus rhythm  Interpretation: YONI. No STEMI.      The Emergency Provider reviewed the vital signs and test results, which are outlined above.    ED Discussion     4:06 PM: Reassessed pt. Pt states their condition has improved at this time.  Discussed with pt all pertinent ED information and results. Discussed plan of treatment with pt. Gave pt all f/u and return to the ED instructions. All questions and concerns were addressed at this time. Pt understands and agrees to plan as discussed. Pt is stable for discharge.       ED Medication(s):  Medications - No data to display    New Prescriptions    TRAMADOL (ULTRAM) 50 MG TABLET    Take 1 tablet (50 mg total) by mouth every 6 (six) hours as needed.       Follow-up Information     Lee Dwyer MD. Call in 2 days.    Specialty:  Family Medicine  Contact information:  [address] [place] [address] [phone]                     Medical Decision Making    Medical Decision Making:   Clinical Tests:   Lab Tests: Reviewed and Ordered  Radiological Study: Reviewed and Ordered  Medical Tests: Reviewed and Ordered           Scribe Attestation:   Scribe #1: I performed the above scribed service and the documentation accurately describes the services I  performed. I attest to the accuracy of the note.    Attending:   Physician Attestation Statement for Scribe #1: I, Tulio Patel Jr., MD, personally performed the services described in this documentation, as scribed by Andriy Islas, in my presence, and it is both accurate and complete.          Clinical Impression       ICD-10-CM ICD-9-CM   1. Dyspnea, unspecified type R06.00 786.09   2. SOB (shortness of breath) R06.02 786.05   3. Pain R52 780.96   4. Shortness of breath R06.02 786.05   5. Hip arthritis M16.10 716.95       Disposition:   Disposition: Discharged  Condition: Stable         Tulio Patel Jr., MD  12/21/17 1507

## 2017-12-29 ENCOUNTER — OFFICE VISIT (OUTPATIENT)
Dept: HEMATOLOGY/ONCOLOGY | Facility: CLINIC | Age: 77
End: 2017-12-29
Payer: MEDICARE

## 2017-12-29 ENCOUNTER — LAB VISIT (OUTPATIENT)
Dept: LAB | Facility: HOSPITAL | Age: 77
End: 2017-12-29
Attending: INTERNAL MEDICINE
Payer: MEDICARE

## 2017-12-29 VITALS
BODY MASS INDEX: 19.99 KG/M2 | WEIGHT: 117.06 LBS | TEMPERATURE: 96 F | HEART RATE: 68 BPM | HEIGHT: 64 IN | SYSTOLIC BLOOD PRESSURE: 141 MMHG | OXYGEN SATURATION: 99 % | DIASTOLIC BLOOD PRESSURE: 89 MMHG | RESPIRATION RATE: 16 BRPM

## 2017-12-29 DIAGNOSIS — C50.411 MALIGNANT NEOPLASM OF UPPER-OUTER QUADRANT OF RIGHT BREAST IN FEMALE, ESTROGEN RECEPTOR POSITIVE: Primary | ICD-10-CM

## 2017-12-29 DIAGNOSIS — Z17.0 MALIGNANT NEOPLASM OF UPPER-OUTER QUADRANT OF RIGHT BREAST IN FEMALE, ESTROGEN RECEPTOR POSITIVE: Primary | ICD-10-CM

## 2017-12-29 DIAGNOSIS — Z17.0 MALIGNANT NEOPLASM OF UPPER-OUTER QUADRANT OF RIGHT BREAST IN FEMALE, ESTROGEN RECEPTOR POSITIVE: ICD-10-CM

## 2017-12-29 DIAGNOSIS — C50.411 MALIGNANT NEOPLASM OF UPPER-OUTER QUADRANT OF RIGHT BREAST IN FEMALE, ESTROGEN RECEPTOR POSITIVE: ICD-10-CM

## 2017-12-29 DIAGNOSIS — M85.89 OSTEOPENIA OF MULTIPLE SITES: ICD-10-CM

## 2017-12-29 DIAGNOSIS — Z79.811 AROMATASE INHIBITOR USE: ICD-10-CM

## 2017-12-29 LAB
ALBUMIN SERPL BCP-MCNC: 3.7 G/DL
ALP SERPL-CCNC: 90 U/L
ALT SERPL W/O P-5'-P-CCNC: 39 U/L
ANION GAP SERPL CALC-SCNC: 9 MMOL/L
AST SERPL-CCNC: 29 U/L
BASOPHILS # BLD AUTO: 0.02 K/UL
BASOPHILS NFR BLD: 0.3 %
BILIRUB SERPL-MCNC: 0.4 MG/DL
BUN SERPL-MCNC: 29 MG/DL
CALCIUM SERPL-MCNC: 10 MG/DL
CHLORIDE SERPL-SCNC: 98 MMOL/L
CO2 SERPL-SCNC: 34 MMOL/L
CREAT SERPL-MCNC: 1.2 MG/DL
DIFFERENTIAL METHOD: ABNORMAL
EOSINOPHIL # BLD AUTO: 0.1 K/UL
EOSINOPHIL NFR BLD: 1.8 %
ERYTHROCYTE [DISTWIDTH] IN BLOOD BY AUTOMATED COUNT: 12.7 %
EST. GFR  (AFRICAN AMERICAN): 50 ML/MIN/1.73 M^2
EST. GFR  (NON AFRICAN AMERICAN): 44 ML/MIN/1.73 M^2
GLUCOSE SERPL-MCNC: 89 MG/DL
HCT VFR BLD AUTO: 41.1 %
HGB BLD-MCNC: 13.6 G/DL
LYMPHOCYTES # BLD AUTO: 0.6 K/UL
LYMPHOCYTES NFR BLD: 10.3 %
MCH RBC QN AUTO: 30.8 PG
MCHC RBC AUTO-ENTMCNC: 33.1 G/DL
MCV RBC AUTO: 93 FL
MONOCYTES # BLD AUTO: 0.8 K/UL
MONOCYTES NFR BLD: 13 %
NEUTROPHILS # BLD AUTO: 4.6 K/UL
NEUTROPHILS NFR BLD: 74.6 %
PLATELET # BLD AUTO: 232 K/UL
PMV BLD AUTO: 10.3 FL
POTASSIUM SERPL-SCNC: 3.7 MMOL/L
PROT SERPL-MCNC: 6.8 G/DL
RBC # BLD AUTO: 4.41 M/UL
SODIUM SERPL-SCNC: 141 MMOL/L
WBC # BLD AUTO: 6.1 K/UL

## 2017-12-29 PROCEDURE — 99999 PR PBB SHADOW E&M-EST. PATIENT-LVL III: CPT | Mod: PBBFAC,,, | Performed by: INTERNAL MEDICINE

## 2017-12-29 PROCEDURE — 36415 COLL VENOUS BLD VENIPUNCTURE: CPT | Mod: PO

## 2017-12-29 PROCEDURE — 99214 OFFICE O/P EST MOD 30 MIN: CPT | Mod: S$GLB,,, | Performed by: INTERNAL MEDICINE

## 2017-12-29 PROCEDURE — 85025 COMPLETE CBC W/AUTO DIFF WBC: CPT | Mod: PO

## 2017-12-29 PROCEDURE — 80053 COMPREHEN METABOLIC PANEL: CPT | Mod: PO

## 2017-12-29 NOTE — PROGRESS NOTES
Hematology/Oncology Office Note  [facility] Diagnosis: Right sided breast cancer, ER+/AR neg/Upg6Ctf neg     Stage: Unknown. pT1b     Pathology: Infiltrating ductal carcinoma, 0.6cm, margins negative. ER+/AR neg/Ipt2Ywx neg.     Prior Treatment: Right breast lumpectomy on 5/5/15 by Dr. Pacheco     Current Treatment: Adjuvant endocrine therapy, started 6/2015         CC:  Breast cancer    Referred by:  No ref. provider found    History of Present Illness:  Mr. Young is a 76 y/o female with hypertrophic cardiomyopathy, ICD, and right-sided breast cancer. She had an abnormal screening mammogram in March 2015, followed by abnormal diagnostic mammogram and breast ultrasound. She underwent initial biopsy at [facility], which was positive for breast cancer. She then underwent R breast lumpectomy by Dr. Pacheco on 5/5/15. She did not undergo sentinel LN dissection due to comorbidities and feeling that she would likely not be a candidate for chemotherapy. After our intial meeting, we did discuss that due to her decreased performance status and cardiomyopathy, she was not a candidate for chemotherapy. She also met with Radiation Oncology. They are also recommended against adjuvant radiation. She is taking adjuvant Letrozole. She denies any recurrent hospitalizations for health problems aside from cardiac procedures for ICD/pacemaker implantation, ablation surgery. No recurrent infections. No chronic LE edema. No pulmonary edema. She has hypertrophic obstructive cardiomyopathy, causing diastolic dysfunction, but does not require Lasix. She does have significant SOB and HOBBS. She gets quite winded just walked on her driveway to her mailbox. She cannot walk a full block without stopping to rest.        I have reviewed and updated the HPI, ROS, PMHx, Social Hx, Family Hx and treatment history.    Today's visit:  Patient presents today with complaints of Zometa.  She claims that Zometa gives her hallucinations after the  infusion.  She remains compliant with letrozole and is tolerating treatment well    Past Medical History:   Diagnosis Date    *Atrial fibrillation     *Atrial flutter     Breast cancer     Cardiomyopathy     HOCM (hypertrophic obstructive cardiomyopathy) 9/6/2013    Hyperlipidemia     Hypertension     Macular degeneration     Orthostatic hypotension     Ventricular tachycardia          Social History:  No tobacco, alcohol, or illicit drugs      Family History: family history includes Cancer in her mother; Cancer (age of onset: 60) in her brother; Heart disease in her father and mother.        HPI    Review of Systems   Constitutional: Positive for appetite change. Negative for activity change, fatigue, fever and unexpected weight change.   HENT: Negative for congestion, dental problem, facial swelling, mouth sores, nosebleeds, sore throat and trouble swallowing.    Eyes: Negative.  Negative for pain.   Respiratory: Negative for apnea, cough, choking, chest tightness and shortness of breath.    Cardiovascular: Negative for chest pain, palpitations and leg swelling.   Gastrointestinal: Negative for abdominal distention, blood in stool, diarrhea, nausea and vomiting.   Endocrine: Negative.    Genitourinary: Negative for difficulty urinating, dyspareunia, frequency, hematuria, pelvic pain and vaginal bleeding.   Musculoskeletal: Positive for arthralgias. Negative for back pain, gait problem, joint swelling, myalgias and neck pain.   Skin: Negative for pallor, rash and wound.   Allergic/Immunologic: Negative.  Negative for environmental allergies.   Neurological: Negative for tremors, facial asymmetry, speech difficulty, weakness and light-headedness.   Hematological: Negative for adenopathy. Does not bruise/bleed easily.   Psychiatric/Behavioral: Negative for agitation and hallucinations. The patient is not nervous/anxious and is not hyperactive.        Objective:       Vitals:    12/29/17 1436   BP: (!)  "141/89   Pulse: 68   Resp: 16   Temp: 96 °F (35.6 °C)   SpO2: 99%   Weight: 53.1 kg (117 lb 1 oz)   Height: 5' 4" (1.626 m)     Physical Exam   Constitutional: She is oriented to person, place, and time. She appears well-developed and well-nourished. No distress.   Well groomed   HENT:   Head: Normocephalic and atraumatic.   Right Ear: External ear normal.   Left Ear: External ear normal.   Nose: Nose normal. Right sinus exhibits no maxillary sinus tenderness and no frontal sinus tenderness. Left sinus exhibits no maxillary sinus tenderness and no frontal sinus tenderness.   Mouth/Throat: Oropharynx is clear and moist and mucous membranes are normal. Normal dentition.   Eyes: Conjunctivae, EOM and lids are normal. Pupils are equal, round, and reactive to light. Lids are everted and swept, no foreign bodies found. No scleral icterus.   Neck: Trachea normal and normal range of motion. Neck supple. No JVD present. No tracheal deviation present. No thyroid mass and no thyromegaly present.   No crepitus   Cardiovascular: Normal rate, regular rhythm, normal heart sounds, intact distal pulses and normal pulses.  Exam reveals no gallop and no friction rub.    No murmur heard.  Pulmonary/Chest: Effort normal and breath sounds normal. She has no wheezes. She has no rales. She exhibits no tenderness.   Abdominal: Soft. Normal appearance and bowel sounds are normal. She exhibits no distension and no mass. There is no hepatosplenomegaly. There is no tenderness. There is no rebound and no guarding.   Musculoskeletal: Normal range of motion. She exhibits no edema or tenderness.   Lymphadenopathy:     She has no cervical adenopathy.   Neurological: She is alert and oriented to person, place, and time. No cranial nerve deficit. She exhibits normal muscle tone. Coordination normal.   Skin: Skin is warm, dry and intact. No rash noted.   Psychiatric: She has a normal mood and affect. Her behavior is normal. Judgment and thought content " normal.       Lab Results   Component Value Date    WBC 6.10 12/29/2017    HGB 13.6 12/29/2017    HCT 41.1 12/29/2017    MCV 93 12/29/2017     12/29/2017     Lab Results   Component Value Date    CREATININE 1.2 12/29/2017    BUN 29 (H) 12/29/2017     12/29/2017    K 3.7 12/29/2017    CL 98 12/29/2017    CO2 34 (H) 12/29/2017     Lab Results   Component Value Date    ALT 39 12/29/2017    AST 29 12/29/2017    ALKPHOS 90 12/29/2017    BILITOT 0.4 12/29/2017         Assessment:       Karena Young is a 77 y.o. female with hypertrophic obstructive cardiomyopathy, CKD comes in for management of breast cancer.  She was previously followed by Dr. Hu in the hematology/oncology clinic and I am seeing the patient for the first time today in Dr. Hu's absence She has a history of Right sided breast cancer: Estrogen positive, CO and [id number] negative. pT1b tumor s/p lumpectomy. Previously discussed the reasons for surgical staging in helping to determine whether adjuvant chemotherapy is recommended or not. Also discussed the risks and benefits of adjuvant chemotherapy. In this patient with advanced age, comorbidities and most importantly significant dyspnea on exertion, would not recommend adjuvant chemotherapy. Do feel that the risks of chemotherapy including infection outweigh the benefits in her case. Therefore, we discussed that she does not need to undergo further surgical staging as it would not change our management. As her tumor is relatively small and has hormone receptor positive / [id number] negative pattern, she has a good prognosis with adjuvant endocrine therapy for 5 yrs. She was evaluated by [facility] for adjuvant radiation therapy and she was advised against pursuing radiation.   Patient continues to tolerate letrozole well, however, she reports that Zometa gives her hallucinations.  We will discontinue Zometa and start Prolia 60 mg every 6 months.      Stage I pT1b, N0, M0 ER/CO positive, HER-2  negative infiltrating ductal carcinoma of the right breast status post lumpectomy (no sentinel lymph node biopsy due to comorbidities)  --letrozole 2.5 mg daily  -- RTC q3 months   -- Mammogram due approx 4/2018     Osteopenia: Seen on DEXA 3/26/15. She was started on Calcium and Vit D.   --Patient unable to tolerate Zometa  --Discontinue Zometa and proceed with Prolia 60 mg every 6 months       Hypertrophic obstructive cardiomyopathy: s/p ETOH ablation 4/2006.   -- Rollator walker requested / ordered at past visit.     Atrial fibrillation: Had transient Atrial flutter and Afib in 2009 and 2010. Also had VT and was placed on Amiodarone for that with no further Afib. Has ICD/pacemaker.    --Currently on full anticoagulation

## 2018-01-05 ENCOUNTER — OFFICE VISIT (OUTPATIENT)
Dept: INTERNAL MEDICINE | Facility: CLINIC | Age: 78
End: 2018-01-05
Payer: MEDICARE

## 2018-01-05 VITALS
BODY MASS INDEX: 20.4 KG/M2 | SYSTOLIC BLOOD PRESSURE: 92 MMHG | TEMPERATURE: 97 F | HEART RATE: 60 BPM | DIASTOLIC BLOOD PRESSURE: 62 MMHG | WEIGHT: 119.5 LBS | HEIGHT: 64 IN

## 2018-01-05 DIAGNOSIS — I10 ESSENTIAL HYPERTENSION: ICD-10-CM

## 2018-01-05 DIAGNOSIS — I42.1 HOCM (HYPERTROPHIC OBSTRUCTIVE CARDIOMYOPATHY): Primary | ICD-10-CM

## 2018-01-05 DIAGNOSIS — E55.9 VITAMIN D DEFICIENCY: ICD-10-CM

## 2018-01-05 DIAGNOSIS — N18.30 CKD (CHRONIC KIDNEY DISEASE) STAGE 3, GFR 30-59 ML/MIN: ICD-10-CM

## 2018-01-05 DIAGNOSIS — R53.1 WEAKNESS: ICD-10-CM

## 2018-01-05 DIAGNOSIS — E78.5 HYPERLIPIDEMIA, UNSPECIFIED HYPERLIPIDEMIA TYPE: ICD-10-CM

## 2018-01-05 DIAGNOSIS — I95.9 HYPOTENSION, UNSPECIFIED HYPOTENSION TYPE: ICD-10-CM

## 2018-01-05 DIAGNOSIS — I48.20 CHRONIC ATRIAL FIBRILLATION: ICD-10-CM

## 2018-01-05 PROCEDURE — G0009 ADMIN PNEUMOCOCCAL VACCINE: HCPCS | Mod: S$GLB,,, | Performed by: FAMILY MEDICINE

## 2018-01-05 PROCEDURE — G0008 ADMIN INFLUENZA VIRUS VAC: HCPCS | Mod: S$GLB,,, | Performed by: FAMILY MEDICINE

## 2018-01-05 PROCEDURE — 90732 PPSV23 VACC 2 YRS+ SUBQ/IM: CPT | Mod: S$GLB,,, | Performed by: FAMILY MEDICINE

## 2018-01-05 PROCEDURE — 99214 OFFICE O/P EST MOD 30 MIN: CPT | Mod: S$GLB,,, | Performed by: FAMILY MEDICINE

## 2018-01-05 PROCEDURE — 90662 IIV NO PRSV INCREASED AG IM: CPT | Mod: S$GLB,,, | Performed by: FAMILY MEDICINE

## 2018-01-05 PROCEDURE — 99999 PR PBB SHADOW E&M-EST. PATIENT-LVL IV: CPT | Mod: PBBFAC,,, | Performed by: FAMILY MEDICINE

## 2018-01-05 NOTE — PROGRESS NOTES
"Subjective:      Patient ID: Karena Young is a 77 y.o. female.    Chief Complaint: Follow-up (6 mo )    HPI  78 yo female with A. Fib, HOCM with defibrillator in place, memory loss, HTN, Hyperlipidemia here for f/u.  She is now living with her daughter, will be getting her own place next door.  She was seen in ER on several occasions in past few mos.  SOB/A. Fib and had ablation with Dr. Keke Aguilar.  Pt is on eliquis, but been out of script for a few wks b/c of cost.    She is c/o weakness, lisandra with standing.  Her BP is running low and has been on a few recent visits to .  She had a fall and hurt the hip.  Xrays with no fracture, just OA.  She took pain meds/tramadol and she did not react well.  Now, the hip is better.  She is ambulating with a rollator walker.  No falls.    Appetite is good, slowly putting some weight back on.  Breast CA/on oral med.  Monitor by Dr. Richardson    Past Medical History:   Diagnosis Date    *Atrial fibrillation     *Atrial flutter     Breast cancer     Cardiomyopathy     HOCM (hypertrophic obstructive cardiomyopathy) 9/6/2013    Hyperlipidemia     Hypertension     Macular degeneration     Orthostatic hypotension     Ventricular tachycardia      Family History   Problem Relation Age of Onset    Cancer Mother     Heart disease Mother     Heart disease Father     Cancer Brother 60     Past Surgical History:   Procedure Laterality Date    BREAST BIOPSY      CARDIAC ELECTROPHYSIOLOGY STUDY AND ABLATION  4/06    CATARACT EXTRACTION      ICD implantation      INSERT / REPLACE / REMOVE PACEMAKER       Social History   Substance Use Topics    Smoking status: Never Smoker    Smokeless tobacco: Never Used    Alcohol use No       BP 92/62   Pulse 60   Temp 97.2 °F (36.2 °C) (Tympanic)   Ht 5' 4" (1.626 m)   Wt 54.2 kg (119 lb 7.8 oz)   BMI 20.51 kg/m²     Review of Systems   Constitutional: Positive for activity change, fatigue and unexpected weight change. Negative " for appetite change, chills, diaphoresis and fever.   HENT: Negative for ear pain, hearing loss, postnasal drip, rhinorrhea and tinnitus.    Eyes: Negative for visual disturbance.   Respiratory: Negative for cough, shortness of breath and wheezing.    Cardiovascular: Negative for chest pain, palpitations and leg swelling.   Gastrointestinal: Negative for abdominal distention, abdominal pain, constipation and diarrhea.   Genitourinary: Negative for dysuria, frequency, hematuria and urgency.   Musculoskeletal: Positive for gait problem. Negative for arthralgias and back pain.   Neurological: Positive for dizziness and weakness. Negative for headaches.   Hematological: Negative for adenopathy.   Psychiatric/Behavioral: Positive for confusion. Negative for agitation and behavioral problems.     Objective:     Physical Exam   Constitutional: She is oriented to person, place, and time. She appears well-developed and well-nourished.   HENT:   Mouth/Throat: Oropharynx is clear and moist.   Eyes: Pupils are equal, round, and reactive to light.   Neck: Normal range of motion. Neck supple.   Cardiovascular: Normal rate, regular rhythm and normal heart sounds.    Pulmonary/Chest: Effort normal and breath sounds normal. No respiratory distress. She has no wheezes. She has no rales.   Abdominal: Soft. Bowel sounds are normal. She exhibits no distension. There is no tenderness.   Musculoskeletal: She exhibits no edema.   Neurological: She is alert and oriented to person, place, and time.   Skin: Skin is warm and dry.   Psychiatric: She has a normal mood and affect. Her behavior is normal. Judgment and thought content normal.   Nursing note and vitals reviewed.      Lab Results   Component Value Date    WBC 6.10 12/29/2017    HGB 13.6 12/29/2017    HCT 41.1 12/29/2017     12/29/2017    CHOL 169 03/17/2016    TRIG 77 03/17/2016    HDL 56 03/17/2016    ALT 39 12/29/2017    AST 29 12/29/2017     12/29/2017    K 3.7  12/29/2017    CL 98 12/29/2017    CREATININE 1.2 12/29/2017    BUN 29 (H) 12/29/2017    CO2 34 (H) 12/29/2017    TSH 2.845 11/11/2017    INR 1.0 12/21/2017       Assessment:     1. HOCM (hypertrophic obstructive cardiomyopathy)    2. Chronic atrial fibrillation    3. CKD (chronic kidney disease) stage 3, GFR 30-59 ml/min    4. Essential hypertension    5. Hyperlipidemia, unspecified hyperlipidemia type    6. Vitamin D deficiency    7. Weakness    8. Hypotension, unspecified hypotension type       Plan:   HOCM (hypertrophic obstructive cardiomyopathy)    Chronic atrial fibrillation    CKD (chronic kidney disease) stage 3, GFR 30-59 ml/min    Essential hypertension    Hyperlipidemia, unspecified hyperlipidemia type  -     TSH; Future; Expected date: 01/05/2018    Vitamin D deficiency  -     Vitamin D; Future; Expected date: 01/05/2018    Weakness    Hypotension, unspecified hypotension type    Other orders  -     apixaban 5 mg Tab; Take 1 tablet (5 mg total) by mouth 2 (two) times daily.  Dispense: 60 tablet; Refill: 2  -     (In Office Administered) Pneumococcal Polysaccharide Vaccine (23 Valent) (SQ/IM)  -     Influenza - High Dose (65+) (PF) (IM)    BP is too low.  Stop the HCTZ 12.5mg.  Monitor BP at home and report to MD if SBP remains under 100 or DBP under 60, lisandra if pt feeling weak.  Have asked the pt to drink more water  Pt is a fall risk/has memory impairment.  I have refilled the anticoagulant and discussed with pt and daughter that she is higher risk b/c of her fall risk.  Fall precautions/close monitoring  Update labs  Flu and Pneumo 23 updated today  Keep specialty f/u  F/u with me 3-4 mos

## 2018-01-15 ENCOUNTER — CLINICAL SUPPORT (OUTPATIENT)
Dept: ELECTROPHYSIOLOGY | Facility: CLINIC | Age: 78
End: 2018-01-15
Payer: MEDICARE

## 2018-01-15 DIAGNOSIS — Z95.810 PRESENCE OF AUTOMATIC CARDIOVERTER/DEFIBRILLATOR (AICD): ICD-10-CM

## 2018-01-22 ENCOUNTER — TELEPHONE (OUTPATIENT)
Dept: ELECTROPHYSIOLOGY | Facility: CLINIC | Age: 78
End: 2018-01-22

## 2018-01-22 NOTE — TELEPHONE ENCOUNTER
Spoke to patient's daughter re: missed AICD remote transmission 1/15/17.  Per daughter, Ms. Young is in the process of moving and her remote monitor has NOT been connected from approx 1 month.  She will retreive the monitor from her brothers' home and notify the clinic if she does not have a WireX adaptor.  Daughter believes her mother was connecting via landline phone but she will no longer have phone service at her new residence.  We will need to issue a WireX to patient if she does not have one.  May need to contact [employer] directly to have them ship directly to patient since she lives out of town.

## 2018-01-25 ENCOUNTER — TELEPHONE (OUTPATIENT)
Dept: ELECTROPHYSIOLOGY | Facility: CLINIC | Age: 78
End: 2018-01-25

## 2018-01-25 DIAGNOSIS — I48.92 ATRIAL FLUTTER, UNSPECIFIED TYPE: Primary | ICD-10-CM

## 2018-02-05 DIAGNOSIS — I95.1 ORTHOSTATIC HYPOTENSION: ICD-10-CM

## 2018-02-05 RX ORDER — LISINOPRIL 10 MG/1
TABLET ORAL
Qty: 30 TABLET | Refills: 11 | Status: SHIPPED | OUTPATIENT
Start: 2018-02-05 | End: 2019-02-27 | Stop reason: ALTCHOICE

## 2018-02-19 ENCOUNTER — TELEPHONE (OUTPATIENT)
Dept: ELECTROPHYSIOLOGY | Facility: CLINIC | Age: 78
End: 2018-02-19

## 2018-02-19 ENCOUNTER — TELEPHONE (OUTPATIENT)
Dept: INTERNAL MEDICINE | Facility: CLINIC | Age: 78
End: 2018-02-19

## 2018-02-19 NOTE — TELEPHONE ENCOUNTER
Patient is still waiting for her Wire-X box from [facility] to come in so she can connect her home monitor. I sent Jose with [facility] an Email to check the status of her Wire-X. I will reschedule her remote transmission out 2 weeks.

## 2018-02-21 ENCOUNTER — TELEPHONE (OUTPATIENT)
Dept: ELECTROPHYSIOLOGY | Facility: CLINIC | Age: 78
End: 2018-02-21

## 2018-02-21 NOTE — TELEPHONE ENCOUNTER
Spoke to pt sister who said pt ICD shocked her x 5. Pt sister called 911, pt is currently @ [facility], she is being d/c today. Pt sister will bring records with her to appt tomorrow 2/22.

## 2018-02-22 ENCOUNTER — OFFICE VISIT (OUTPATIENT)
Dept: ELECTROPHYSIOLOGY | Facility: CLINIC | Age: 78
End: 2018-02-22
Payer: MEDICARE

## 2018-02-22 ENCOUNTER — HOSPITAL ENCOUNTER (OUTPATIENT)
Dept: CARDIOLOGY | Facility: CLINIC | Age: 78
Discharge: HOME | End: 2018-02-22
Payer: MEDICARE

## 2018-02-22 ENCOUNTER — TELEPHONE (OUTPATIENT)
Dept: ELECTROPHYSIOLOGY | Facility: CLINIC | Age: 78
End: 2018-02-22

## 2018-02-22 VITALS — HEIGHT: 64 IN | BODY MASS INDEX: 20.4 KG/M2 | WEIGHT: 119.5 LBS | HEART RATE: 98 BPM

## 2018-02-22 DIAGNOSIS — I48.92 ATRIAL FLUTTER, UNSPECIFIED TYPE: Primary | ICD-10-CM

## 2018-02-22 DIAGNOSIS — Z95.810 IMPLANTABLE CARDIOVERTER-DEFIBRILLATOR (ICD) IN SITU: ICD-10-CM

## 2018-02-22 DIAGNOSIS — Z45.02 ENCOUNTER FOR FITTING OR ADJUSTMENT OF IMPLANTABLE CARDIOVERTER-DEFIBRILLATOR (ICD): ICD-10-CM

## 2018-02-22 DIAGNOSIS — I45.10 RBBB: ICD-10-CM

## 2018-02-22 DIAGNOSIS — Z79.899 AT RISK FOR AMIODARONE TOXICITY WITH LONG TERM USE: ICD-10-CM

## 2018-02-22 DIAGNOSIS — I42.1 HOCM (HYPERTROPHIC OBSTRUCTIVE CARDIOMYOPATHY): ICD-10-CM

## 2018-02-22 DIAGNOSIS — I48.92 ATRIAL FLUTTER, UNSPECIFIED TYPE: ICD-10-CM

## 2018-02-22 DIAGNOSIS — I10 ESSENTIAL HYPERTENSION: ICD-10-CM

## 2018-02-22 DIAGNOSIS — E78.5 HYPERLIPIDEMIA WITH TARGET LDL LESS THAN 130: Chronic | ICD-10-CM

## 2018-02-22 DIAGNOSIS — Z91.89 AT RISK FOR AMIODARONE TOXICITY WITH LONG TERM USE: ICD-10-CM

## 2018-02-22 DIAGNOSIS — I47.29 PAROXYSMAL VENTRICULAR TACHYCARDIA: ICD-10-CM

## 2018-02-22 PROCEDURE — 93000 ELECTROCARDIOGRAM COMPLETE: CPT | Mod: S$GLB,,, | Performed by: INTERNAL MEDICINE

## 2018-02-22 PROCEDURE — 1159F MED LIST DOCD IN RCRD: CPT | Mod: S$GLB,,, | Performed by: INTERNAL MEDICINE

## 2018-02-22 PROCEDURE — 99215 OFFICE O/P EST HI 40 MIN: CPT | Mod: S$GLB,,, | Performed by: INTERNAL MEDICINE

## 2018-02-22 PROCEDURE — 1126F AMNT PAIN NOTED NONE PRSNT: CPT | Mod: S$GLB,,, | Performed by: INTERNAL MEDICINE

## 2018-02-22 PROCEDURE — 99999 PR PBB SHADOW E&M-EST. PATIENT-LVL III: CPT | Mod: PBBFAC,,, | Performed by: INTERNAL MEDICINE

## 2018-02-22 PROCEDURE — 3008F BODY MASS INDEX DOCD: CPT | Mod: S$GLB,,, | Performed by: INTERNAL MEDICINE

## 2018-02-22 NOTE — TELEPHONE ENCOUNTER
ABLATION EDUCATION CHECKLIST    2/22/18  PRE - PROCEDURE LABS HAVE BEEN ORDERED FOR YOU @ [facility]  BE SURE TO ARRIVE AT YOUR SCHEDULED TIME FOR THIS LAB WORK!  (YOU DO NOT HAVE TO FAST FOR THIS LABWORK!!!!)    2/26/18   Report to Cardiology Waiting Room on 3rd floor of the Hospital    (Do not report to clinic)  Directions for Reporting to Cardiology Waiting Area in the Hospital  If you park in the Parking Garage:  Take elevators to the 2nd floor  Walk up ramp and turn right by Gold Elevators  Take elevator to the 3rd floor  Upon exiting the elevator, turn away from the clinic areas  Walk long sandoval around to front of hospital to area with windows overlooking [facility]  Check in at Reception Desk  OR  If family is dropping you off:  Have them drop you off at the front of the Hospital  (Near the ER, where all the flags are hung).  Take the E elevators to the 3rd floor.  Check in at the Reception Desk in the waiting room.    Do not eat or drink anything after: 12 mn on the night before your procedure    Medications:   Take your evening dose of Eliquis at 5 pm on the evening before your procedure  Hold your Eliquis on the morning of your procedure  You may take your other usual morning medications with a sip of water    You will be spending the night after your procedure  You will need someone to drive you home the day after your procedure.    Your pain during your procedure will be managed by the anesthesia team.     THE ABOVE INSTRUCTIONS WERE GIVEN TO THE PATIENT VERBALLY AND THEY VERBALIZED UNDERSTANDING.  THEY DO NOT REQUIRE ANY SPECIAL NEEDS AND DO NOT HAVE ANY LEARNING BARRIERS.    Any need to reschedule or cancel procedures, or any questions regarding your procedures should be addressed directly with the Arrhythmia Department Nurses at the following phone number: [phone]

## 2018-02-22 NOTE — PROGRESS NOTES
"  Subjective:   Patient ID:  Karena Young is a 78 y.o. female     Chief complaint:Atrial Flutter and hospital f/u      HPI    Background as recorded in my note (12/12/16):  73 woman   History of HOCM s/p ETOH ablation 4/06, ventricular tachycardia, ICD, HTN, orthostatic hypotension, and HLP.Also noted to have transient A Flutter and AF in 2009 and 2010. Has had no AF since amiodarone -- She also later had VT and is on amio for that now -- since 2012   ICD initially implanted by Dr Gonzales in 11/04 for syncope with VTNS>>replaced in 6/2008   Had a syncopal spell 2/20/12 -- ICD revealed event at 3:44PM-- 4 sec VTNS, also had NSVT x 7; longest 7 seconds on 1/1/12>>started on amiodarone and most recent ICD interrogation revealed no NSVT. She has had no further syncope and is without complaints of dizziness. She denies any palpitations, sob, chest pain, or edema.   In past had AFl as well as AF noted by ICD and ATPed--   Mild CKD -- Cr 1.3 to 1.4 -- stable   CHADS=1 (HTN) on aspirin   DLCO normal 6/5/12, 6/2014 82%, FVC 73% TSH 6/15 was WNL -- 2.5  Had ICD replacement and lead was buried deeper in pocket In August 2013 -- she is doing well and feels quite good --her main c/o at this time is "hallcucinations" at night. She has some HOBBS (climbing stairs etc -- class II)   ICD eval today shows no dysrhythmias   V pacing was 0% and A was 76%-- battery at [facility]-- she had an ICD shock in may during breast bx / cautery -- there were multiple noise pick ups but only one shock-- she was asleep.  Her only c/o is occ orthostatic dizziness -- this is mild but noticeable -- BPs at home are 100-110/60 to 70  She has no SOB and she can walk ~ a block or so s issues     Last echo from 6/2014:  >>  1 - Concentric remodeling.   2 - Normal left ventricular systolic function (EF 60-65%).   3 - Normal right ventricular systolic function .   4 - Left ventricular diastolic dysfunction.   5 - Severe left atrial enlargement. BAXTER 42  6 - " "Mild aortic regurgitation.   7 - Mild mitral regurgitation.   8 - Mild pulmonary hypertension.   9 - Mildly elevated central venous pressure.    ECG today shows AR pacing with RBBB-- 60 bpm-      Update till 12/12/16:  She has been on slo mo for a year or more but stable  Gets HOBBS at less than a block  Sleeps 12 hrs a night and then takes a 2 hr nap  She has lost her  recently last sept and she has moved to her sister's house.   ECG today shows AR pacing with FDAVB and RBBB     Update 10/15/17:  She had two ICD shocks last week -- she was ASxc prior. The shocks were one at a time --  by 3 min. This was related to A Flutter  She feels O/W well -- no Fc limitations  I have reviewed the actual image of the ECG tracing obtained today and it shows NSR with RBBB,FDAVB- mild    Update since then:  She had EP/RFA in November: >>  Pacing demonstrated bidirectional conduction flow across the cavotricuspid isthmus.  Atrial burst pacing was performed and atrial flutter was induced.  This appeared to be atrial type 1 flutter with counterclockwise activation within the right atrium.    The cycle length was 320 milliseconds.  Unfortunately, entrainment pacing was not doable as the tachycardia would either terminate or transform into get an irregular type 2 atrial flutter, which would often terminate spontaneously.  On the strength of the baseline ECG findings, we decided to proceed with standard radiofrequency ablation of the cavotricuspid isthmus.   She was kept on amio and did well till a couple days ago  She has had multiple shocks two days ago -- 5 total shocks in the VT zone -- EGMs c/w AVNRT vs AT with 1:1 AG conduction and long FDAVB,  msec  Closer review suggests the latter with A falling eventually in the blinding period thus initiating "VT" detection.  I have reviewed the actual image of the ECG tracing obtained today and it shows A flutter with 2:1 block -- still looks like CTI but CW. Has " RBBB  With QRSd 124.    Current Outpatient Prescriptions   Medication Sig    amiodarone (PACERONE) 200 MG Tab Take 1 tablet (200 mg total) by mouth once daily.    apixaban 5 mg Tab Take 1 tablet (5 mg total) by mouth 2 (two) times daily.    aspirin 81 mg Tab Take 1 tablet by mouth Daily.     atorvastatin (LIPITOR) 20 MG tablet TAKE 1 TABLET BY MOUTH EVERY DAY    B2/VITS A,C,E/LUT/ZEAXANTH/MIN (ICAPS ORAL) Take by mouth 2 (two) times daily.    calcium citrate-vitamin D3 315-200 mg (CITRACAL+D) 315-200 mg-unit per tablet Take 1 tablet by mouth 2 (two) times daily.      cholecalciferol, vitamin D3, 1,000 unit capsule Take 1 capsule (1,000 Units total) by mouth once daily.    diclofenac sodium (SOLARAZE) 3 % gel Apply to right hip as needed for pain daily    donepezil (ARICEPT) 10 MG tablet Take 1 tablet (10 mg total) by mouth every evening.    letrozole (FEMARA) 2.5 mg Tab TAKE 1 TABLET(2.5 MG) BY MOUTH EVERY DAY    lisinopril 10 MG tablet TAKE 1 TABLET BY MOUTH EVERY DAY    metoprolol tartrate (LOPRESSOR) 100 MG tablet Take 1 tablet (100 mg total) by mouth 2 (two) times daily.    walker (ULTRA-LIGHT ROLLATOR) Misc 1 each by Misc.(Non-Drug; Combo Route) route daily as needed.     No current facility-administered medications for this visit.      Review of Systems   Constitution: Negative for decreased appetite, weakness, weight gain and weight loss.   HENT: Negative for nosebleeds.    Eyes: Negative for blurred vision and visual disturbance.   Cardiovascular: Negative for chest pain, claudication, cyanosis, dyspnea on exertion, irregular heartbeat, leg swelling, near-syncope, orthopnea, palpitations, paroxysmal nocturnal dyspnea and syncope.   Respiratory: Negative for cough, shortness of breath and wheezing.    Endocrine: Negative for heat intolerance.   Skin: Negative for rash.   Musculoskeletal: Negative for muscle weakness and myalgias.   Gastrointestinal: Negative for abdominal pain, anorexia, melena,  nausea and vomiting.   Genitourinary: Negative for menorrhagia.   Neurological: Negative for excessive daytime sleepiness, dizziness, headaches, loss of balance, seizures and vertigo.   Psychiatric/Behavioral: Negative for altered mental status and depression. The patient is not nervous/anxious.        Objective:   Physical Exam   Constitutional: She is oriented to person, place, and time. She appears well-developed and well-nourished.   HENT:   Head: Normocephalic and atraumatic.   Right Ear: External ear normal.   Left Ear: External ear normal.   Eyes: Conjunctivae are normal. Pupils are equal, round, and reactive to light. Left eye exhibits no discharge. No scleral icterus.   Neck: Normal range of motion. Neck supple. No thyromegaly present.   Cardiovascular: Normal rate, regular rhythm, normal heart sounds and intact distal pulses.  Exam reveals no gallop, no S3, no S4, no friction rub, no midsystolic click and no opening snap.    No murmur heard.  Pulses:       Carotid pulses are 2+ on the right side, and 2+ on the left side.       Radial pulses are 2+ on the right side, and 2+ on the left side.        Dorsalis pedis pulses are 2+ on the right side, and 2+ on the left side.        Posterior tibial pulses are 2+ on the right side, and 2+ on the left side.   Pulmonary/Chest: Effort normal and breath sounds normal.   Device pocket is in excellent repair   Abdominal: Soft. She exhibits no distension. There is no hepatomegaly. There is no tenderness. There is no guarding.   Musculoskeletal:        Right ankle: She exhibits no swelling.        Left ankle: She exhibits no swelling.        Right lower leg: She exhibits no swelling.        Left lower leg: She exhibits no swelling.   Neurological: She is alert and oriented to person, place, and time. She has normal strength. No cranial nerve deficit. Gait normal.   Skin: Skin is warm, dry and intact. No rash noted. No cyanosis. Nails show no clubbing.   Psychiatric: She  "has a normal mood and affect. Her speech is normal and behavior is normal. Thought content normal. Cognition and memory are normal.   Nursing note and vitals reviewed.    Pulse 98   Ht 5' 4" (1.626 m)   Wt 54.2 kg (119 lb 7.8 oz)   BMI 20.51 kg/m²      Assessment:    ICD shocks due to A flutter-- the ECG suggests that the CTI line has leaks in it -- will reablate  1. Atrial flutter, unspecified type    2. At risk for amiodarone toxicity with long term use    3. HOCM (hypertrophic obstructive cardiomyopathy)    4. Encounter for fitting or adjustment of implantable cardioverter-defibrillator (ICD)    5. Essential hypertension    6. Hyperlipidemia with target LDL less than 130    7. Implantable cardioverter-defibrillator (ICD) in situ    8. Paroxysmal ventricular tachycardia    9. RBBB        Plan:    EP/RFA of the CTI -- irrigation cath, DEMOND,  No MARIAELENA, mod sedation  I have discussed the procedure in detail with the patient. I described its benefits and risks. I reviewed alternative therapies and discussed their potential value. The patient was given ample opportunity to express concerns and ask questions and I provided appropriate responses and  answers to such.The patient understands and agrees to proceed.  Consent form was signed today by patient and myself and appropriately witnessed.     No orders of the defined types were placed in this encounter.    Follow-up post op.  There are no discontinued medications.  New Prescriptions    No medications on file     Modified Medications    No medications on file              "

## 2018-02-26 ENCOUNTER — ANESTHESIA EVENT (OUTPATIENT)
Dept: MEDSURG UNIT | Facility: HOSPITAL | Age: 78
End: 2018-02-26
Payer: COMMERCIAL

## 2018-02-26 ENCOUNTER — ANESTHESIA (OUTPATIENT)
Dept: MEDSURG UNIT | Facility: HOSPITAL | Age: 78
End: 2018-02-26
Payer: COMMERCIAL

## 2018-02-26 ENCOUNTER — HOSPITAL ENCOUNTER (OUTPATIENT)
Facility: HOSPITAL | Age: 78
Discharge: HOME | End: 2018-02-26
Attending: INTERNAL MEDICINE | Admitting: INTERNAL MEDICINE
Payer: MEDICARE

## 2018-02-26 VITALS
HEART RATE: 66 BPM | TEMPERATURE: 98 F | SYSTOLIC BLOOD PRESSURE: 116 MMHG | WEIGHT: 119 LBS | DIASTOLIC BLOOD PRESSURE: 58 MMHG | BODY MASS INDEX: 20.32 KG/M2 | OXYGEN SATURATION: 97 % | RESPIRATION RATE: 18 BRPM | HEIGHT: 64 IN

## 2018-02-26 DIAGNOSIS — I48.19 PERSISTENT ATRIAL FIBRILLATION: ICD-10-CM

## 2018-02-26 DIAGNOSIS — I48.92 ATRIAL FLUTTER: ICD-10-CM

## 2018-02-26 DIAGNOSIS — I48.0 PAROXYSMAL ATRIAL FIBRILLATION: Primary | ICD-10-CM

## 2018-02-26 DIAGNOSIS — I48.3 TYPICAL ATRIAL FLUTTER: ICD-10-CM

## 2018-02-26 DIAGNOSIS — Z95.810 IMPLANTABLE CARDIOVERTER-DEFIBRILLATOR (ICD) IN SITU: ICD-10-CM

## 2018-02-26 PROCEDURE — 93010 ELECTROCARDIOGRAM REPORT: CPT | Mod: 76,,, | Performed by: INTERNAL MEDICINE

## 2018-02-26 PROCEDURE — 93010 ELECTROCARDIOGRAM REPORT: CPT | Mod: ,,, | Performed by: INTERNAL MEDICINE

## 2018-02-26 PROCEDURE — 63600175 PHARM REV CODE 636 W HCPCS: Performed by: NURSE ANESTHETIST, CERTIFIED REGISTERED

## 2018-02-26 PROCEDURE — 25000003 PHARM REV CODE 250

## 2018-02-26 PROCEDURE — 25000003 PHARM REV CODE 250: Performed by: NURSE PRACTITIONER

## 2018-02-26 PROCEDURE — 37000009 HC ANESTHESIA EA ADD 15 MINS: Performed by: INTERNAL MEDICINE

## 2018-02-26 PROCEDURE — 37000008 HC ANESTHESIA 1ST 15 MINUTES: Performed by: INTERNAL MEDICINE

## 2018-02-26 PROCEDURE — D9220A PRA ANESTHESIA: Mod: CRNA,,, | Performed by: NURSE ANESTHETIST, CERTIFIED REGISTERED

## 2018-02-26 PROCEDURE — 27100025 HC TUBING, SET FLUID WARMER: Performed by: NURSE ANESTHETIST, CERTIFIED REGISTERED

## 2018-02-26 PROCEDURE — D9220A PRA ANESTHESIA: Mod: ANES,,, | Performed by: ANESTHESIOLOGY

## 2018-02-26 PROCEDURE — 25000003 PHARM REV CODE 250: Performed by: NURSE ANESTHETIST, CERTIFIED REGISTERED

## 2018-02-26 PROCEDURE — 93005 ELECTROCARDIOGRAM TRACING: CPT

## 2018-02-26 PROCEDURE — 27201037 HC PRESSURE MONITORING SET UP

## 2018-02-26 PROCEDURE — 63600175 PHARM REV CODE 636 W HCPCS

## 2018-02-26 RX ORDER — NAPROXEN SODIUM 220 MG/1
81 TABLET, FILM COATED ORAL DAILY
Status: DISCONTINUED | OUTPATIENT
Start: 2018-02-26 | End: 2018-02-26 | Stop reason: HOSPADM

## 2018-02-26 RX ORDER — SODIUM CHLORIDE 9 MG/ML
INJECTION, SOLUTION INTRAVENOUS CONTINUOUS
Status: DISCONTINUED | OUTPATIENT
Start: 2018-02-26 | End: 2018-02-26 | Stop reason: HOSPADM

## 2018-02-26 RX ORDER — METOPROLOL TARTRATE 100 MG/1
100 TABLET ORAL 2 TIMES DAILY
Status: DISCONTINUED | OUTPATIENT
Start: 2018-02-26 | End: 2018-02-26 | Stop reason: HOSPADM

## 2018-02-26 RX ORDER — ONDANSETRON HYDROCHLORIDE 2 MG/ML
INJECTION, SOLUTION INTRAMUSCULAR; INTRAVENOUS
Status: DISCONTINUED | OUTPATIENT
Start: 2018-02-26 | End: 2018-02-26

## 2018-02-26 RX ORDER — FENTANYL CITRATE 50 UG/ML
INJECTION, SOLUTION INTRAMUSCULAR; INTRAVENOUS
Status: DISCONTINUED | OUTPATIENT
Start: 2018-02-26 | End: 2018-02-26

## 2018-02-26 RX ORDER — PROPOFOL 10 MG/ML
VIAL (ML) INTRAVENOUS CONTINUOUS PRN
Status: DISCONTINUED | OUTPATIENT
Start: 2018-02-26 | End: 2018-02-26

## 2018-02-26 RX ORDER — GLYCOPYRROLATE 0.2 MG/ML
INJECTION INTRAMUSCULAR; INTRAVENOUS
Status: DISCONTINUED | OUTPATIENT
Start: 2018-02-26 | End: 2018-02-26

## 2018-02-26 RX ORDER — PROPOFOL 10 MG/ML
VIAL (ML) INTRAVENOUS
Status: DISCONTINUED | OUTPATIENT
Start: 2018-02-26 | End: 2018-02-26

## 2018-02-26 RX ORDER — SODIUM CHLORIDE 0.9 % (FLUSH) 0.9 %
3 SYRINGE (ML) INJECTION
Status: DISCONTINUED | OUTPATIENT
Start: 2018-02-26 | End: 2018-02-26

## 2018-02-26 RX ORDER — LIDOCAINE HCL/PF 100 MG/5ML
SYRINGE (ML) INTRAVENOUS
Status: DISCONTINUED | OUTPATIENT
Start: 2018-02-26 | End: 2018-02-26

## 2018-02-26 RX ORDER — FENTANYL CITRATE 50 UG/ML
25 INJECTION, SOLUTION INTRAMUSCULAR; INTRAVENOUS EVERY 30 MIN PRN
Status: DISCONTINUED | OUTPATIENT
Start: 2018-02-26 | End: 2018-02-26

## 2018-02-26 RX ADMIN — PROPOFOL 10 MG: 10 INJECTION, EMULSION INTRAVENOUS at 07:02

## 2018-02-26 RX ADMIN — ONDANSETRON 4 MG: 2 INJECTION, SOLUTION INTRAMUSCULAR; INTRAVENOUS at 08:02

## 2018-02-26 RX ADMIN — PROPOFOL 15 MG: 10 INJECTION, EMULSION INTRAVENOUS at 08:02

## 2018-02-26 RX ADMIN — LIDOCAINE HYDROCHLORIDE 10 MG: 20 INJECTION, SOLUTION INTRAVENOUS at 07:02

## 2018-02-26 RX ADMIN — SODIUM CHLORIDE: 0.9 INJECTION, SOLUTION INTRAVENOUS at 07:02

## 2018-02-26 RX ADMIN — APIXABAN 5 MG: 2.5 TABLET, FILM COATED ORAL at 03:02

## 2018-02-26 RX ADMIN — GLYCOPYRROLATE 0.1 MG: 0.2 INJECTION, SOLUTION INTRAMUSCULAR; INTRAVENOUS at 07:02

## 2018-02-26 RX ADMIN — FENTANYL CITRATE 25 MCG: 50 INJECTION, SOLUTION INTRAMUSCULAR; INTRAVENOUS at 07:02

## 2018-02-26 RX ADMIN — EPHEDRINE SULFATE 5 MG: 50 INJECTION, SOLUTION INTRAMUSCULAR; INTRAVENOUS; SUBCUTANEOUS at 08:02

## 2018-02-26 RX ADMIN — PROPOFOL 50 MCG/KG/MIN: 10 INJECTION, EMULSION INTRAVENOUS at 07:02

## 2018-02-26 NOTE — PROGRESS NOTES
Pt ambulated around unit.  R groin remained c/d/i without redness or swelling.  Will continue to monitor.

## 2018-02-26 NOTE — H&P (VIEW-ONLY)
"  Subjective:   Patient ID:  Karena Young is a 78 y.o. female     Chief complaint:Atrial Flutter and hospital f/u      HPI    Background as recorded in my note (12/12/16):  73 woman   History of HOCM s/p ETOH ablation 4/06, ventricular tachycardia, ICD, HTN, orthostatic hypotension, and HLP.Also noted to have transient A Flutter and AF in 2009 and 2010. Has had no AF since amiodarone -- She also later had VT and is on amio for that now -- since 2012   ICD initially implanted by Dr Gonzales in 11/04 for syncope with VTNS>>replaced in 6/2008   Had a syncopal spell 2/20/12 -- ICD revealed event at 3:44PM-- 4 sec VTNS, also had NSVT x 7; longest 7 seconds on 1/1/12>>started on amiodarone and most recent ICD interrogation revealed no NSVT. She has had no further syncope and is without complaints of dizziness. She denies any palpitations, sob, chest pain, or edema.   In past had AFl as well as AF noted by ICD and ATPed--   Mild CKD -- Cr 1.3 to 1.4 -- stable   CHADS=1 (HTN) on aspirin   DLCO normal 6/5/12, 6/2014 82%, FVC 73% TSH 6/15 was WNL -- 2.5  Had ICD replacement and lead was buried deeper in pocket In August 2013 -- she is doing well and feels quite good --her main c/o at this time is "hallcucinations" at night. She has some OHBBS (climbing stairs etc -- class II)   ICD eval today shows no dysrhythmias   V pacing was 0% and A was 76%-- battery at [facility]-- she had an ICD shock in may during breast bx / cautery -- there were multiple noise pick ups but only one shock-- she was asleep.  Her only c/o is occ orthostatic dizziness -- this is mild but noticeable -- BPs at home are 100-110/60 to 70  She has no SOB and she can walk ~ a block or so s issues     Last echo from 6/2014:  >>  1 - Concentric remodeling.   2 - Normal left ventricular systolic function (EF 60-65%).   3 - Normal right ventricular systolic function .   4 - Left ventricular diastolic dysfunction.   5 - Severe left atrial enlargement. BAXTER 42  6 - " "Mild aortic regurgitation.   7 - Mild mitral regurgitation.   8 - Mild pulmonary hypertension.   9 - Mildly elevated central venous pressure.    ECG today shows AR pacing with RBBB-- 60 bpm-      Update till 12/12/16:  She has been on slo mo for a year or more but stable  Gets HOBBS at less than a block  Sleeps 12 hrs a night and then takes a 2 hr nap  She has lost her  recently last sept and she has moved to her sister's house.   ECG today shows AR pacing with FDAVB and RBBB     Update 10/15/17:  She had two ICD shocks last week -- she was ASxc prior. The shocks were one at a time --  by 3 min. This was related to A Flutter  She feels O/W well -- no Fc limitations  I have reviewed the actual image of the ECG tracing obtained today and it shows NSR with RBBB,FDAVB- mild    Update since then:  She had EP/RFA in November: >>  Pacing demonstrated bidirectional conduction flow across the cavotricuspid isthmus.  Atrial burst pacing was performed and atrial flutter was induced.  This appeared to be atrial type 1 flutter with counterclockwise activation within the right atrium.    The cycle length was 320 milliseconds.  Unfortunately, entrainment pacing was not doable as the tachycardia would either terminate or transform into get an irregular type 2 atrial flutter, which would often terminate spontaneously.  On the strength of the baseline ECG findings, we decided to proceed with standard radiofrequency ablation of the cavotricuspid isthmus.   She was kept on amio and did well till a couple days ago  She has had multiple shocks two days ago -- 5 total shocks in the VT zone -- EGMs c/w AVNRT vs AT with 1:1 AG conduction and long FDAVB,  msec  Closer review suggests the latter with A falling eventually in the blinding period thus initiating "VT" detection.  I have reviewed the actual image of the ECG tracing obtained today and it shows A flutter with 2:1 block -- still looks like CTI but CW. Has " RBBB  With QRSd 124.    Current Outpatient Prescriptions   Medication Sig    amiodarone (PACERONE) 200 MG Tab Take 1 tablet (200 mg total) by mouth once daily.    apixaban 5 mg Tab Take 1 tablet (5 mg total) by mouth 2 (two) times daily.    aspirin 81 mg Tab Take 1 tablet by mouth Daily.     atorvastatin (LIPITOR) 20 MG tablet TAKE 1 TABLET BY MOUTH EVERY DAY    B2/VITS A,C,E/LUT/ZEAXANTH/MIN (ICAPS ORAL) Take by mouth 2 (two) times daily.    calcium citrate-vitamin D3 315-200 mg (CITRACAL+D) 315-200 mg-unit per tablet Take 1 tablet by mouth 2 (two) times daily.      cholecalciferol, vitamin D3, 1,000 unit capsule Take 1 capsule (1,000 Units total) by mouth once daily.    diclofenac sodium (SOLARAZE) 3 % gel Apply to right hip as needed for pain daily    donepezil (ARICEPT) 10 MG tablet Take 1 tablet (10 mg total) by mouth every evening.    letrozole (FEMARA) 2.5 mg Tab TAKE 1 TABLET(2.5 MG) BY MOUTH EVERY DAY    lisinopril 10 MG tablet TAKE 1 TABLET BY MOUTH EVERY DAY    metoprolol tartrate (LOPRESSOR) 100 MG tablet Take 1 tablet (100 mg total) by mouth 2 (two) times daily.    walker (ULTRA-LIGHT ROLLATOR) Misc 1 each by Misc.(Non-Drug; Combo Route) route daily as needed.     No current facility-administered medications for this visit.      Review of Systems   Constitution: Negative for decreased appetite, weakness, weight gain and weight loss.   HENT: Negative for nosebleeds.    Eyes: Negative for blurred vision and visual disturbance.   Cardiovascular: Negative for chest pain, claudication, cyanosis, dyspnea on exertion, irregular heartbeat, leg swelling, near-syncope, orthopnea, palpitations, paroxysmal nocturnal dyspnea and syncope.   Respiratory: Negative for cough, shortness of breath and wheezing.    Endocrine: Negative for heat intolerance.   Skin: Negative for rash.   Musculoskeletal: Negative for muscle weakness and myalgias.   Gastrointestinal: Negative for abdominal pain, anorexia, melena,  nausea and vomiting.   Genitourinary: Negative for menorrhagia.   Neurological: Negative for excessive daytime sleepiness, dizziness, headaches, loss of balance, seizures and vertigo.   Psychiatric/Behavioral: Negative for altered mental status and depression. The patient is not nervous/anxious.        Objective:   Physical Exam   Constitutional: She is oriented to person, place, and time. She appears well-developed and well-nourished.   HENT:   Head: Normocephalic and atraumatic.   Right Ear: External ear normal.   Left Ear: External ear normal.   Eyes: Conjunctivae are normal. Pupils are equal, round, and reactive to light. Left eye exhibits no discharge. No scleral icterus.   Neck: Normal range of motion. Neck supple. No thyromegaly present.   Cardiovascular: Normal rate, regular rhythm, normal heart sounds and intact distal pulses.  Exam reveals no gallop, no S3, no S4, no friction rub, no midsystolic click and no opening snap.    No murmur heard.  Pulses:       Carotid pulses are 2+ on the right side, and 2+ on the left side.       Radial pulses are 2+ on the right side, and 2+ on the left side.        Dorsalis pedis pulses are 2+ on the right side, and 2+ on the left side.        Posterior tibial pulses are 2+ on the right side, and 2+ on the left side.   Pulmonary/Chest: Effort normal and breath sounds normal.   Device pocket is in excellent repair   Abdominal: Soft. She exhibits no distension. There is no hepatomegaly. There is no tenderness. There is no guarding.   Musculoskeletal:        Right ankle: She exhibits no swelling.        Left ankle: She exhibits no swelling.        Right lower leg: She exhibits no swelling.        Left lower leg: She exhibits no swelling.   Neurological: She is alert and oriented to person, place, and time. She has normal strength. No cranial nerve deficit. Gait normal.   Skin: Skin is warm, dry and intact. No rash noted. No cyanosis. Nails show no clubbing.   Psychiatric: She  "has a normal mood and affect. Her speech is normal and behavior is normal. Thought content normal. Cognition and memory are normal.   Nursing note and vitals reviewed.    Pulse 98   Ht 5' 4" (1.626 m)   Wt 54.2 kg (119 lb 7.8 oz)   BMI 20.51 kg/m²      Assessment:    ICD shocks due to A flutter-- the ECG suggests that the CTI line has leaks in it -- will reablate  1. Atrial flutter, unspecified type    2. At risk for amiodarone toxicity with long term use    3. HOCM (hypertrophic obstructive cardiomyopathy)    4. Encounter for fitting or adjustment of implantable cardioverter-defibrillator (ICD)    5. Essential hypertension    6. Hyperlipidemia with target LDL less than 130    7. Implantable cardioverter-defibrillator (ICD) in situ    8. Paroxysmal ventricular tachycardia    9. RBBB        Plan:    EP/RFA of the CTI -- irrigation cath, DEMOND,  No MARIAELENA, mod sedation  I have discussed the procedure in detail with the patient. I described its benefits and risks. I reviewed alternative therapies and discussed their potential value. The patient was given ample opportunity to express concerns and ask questions and I provided appropriate responses and  answers to such.The patient understands and agrees to proceed.  Consent form was signed today by patient and myself and appropriately witnessed.     No orders of the defined types were placed in this encounter.    Follow-up post op.  There are no discontinued medications.  New Prescriptions    No medications on file     Modified Medications    No medications on file              "

## 2018-02-26 NOTE — INTERVAL H&P NOTE
The patient has been examined and the H&P has been reviewed:    I concur with the findings and no changes have occurred since H&P was written.  Pt. Presents for AFL-RFA.  Denies fever, chills, cough, or CP. +occasional SOB/HOBBS.   Last dose eliquis: last night  Labs: 2/22/18: wnl except GFR: 48.2.   BUN/Crt wnl: 23/1.1    Procedure consent obtained in clinic and on chart.  Pt confirms RFA-AFL. No further questions.     Anesthesia/Surgery risks, benefits and alternative options discussed and understood by patient/family.    Plan: AFL-RFA  Anesthesia for sedation    Active Hospital Problems    Diagnosis  POA    Atrial flutter [I48.92]  Yes      Resolved Hospital Problems    Diagnosis Date Resolved POA   No resolved problems to display.     SALOME Morton, APRN, FNP-BC  Nurse Practitioner  Cardiac Electrophysiology

## 2018-02-26 NOTE — PROGRESS NOTES
Patient admitted to recovery see [facility] for complete assessment pacu bcg's maintained safety measures verified patient instructed on pain scale and patient verbalized understanding. ekg done and in patient's chart. Also called patient's daughter and updated on patient location with the permission of patient.

## 2018-02-26 NOTE — ANESTHESIA PREPROCEDURE EVALUATION
02/26/2018  Karena Young is a 78 y.o., female with aflutter scheduled for ablation. Old echo (2014) shows normal functio with mild valvular regurgitation. Currently taking eliquis.  Hx of right sided breast cancer      Patient Active Problem List   Diagnosis    Paroxysmal ventricular tachycardia    Implantable cardioverter-defibrillator (ICD) in situ    Paroxysmal atrial fibrillation    Atrial flutter    Orthostatic hypotension    HTN (hypertension)    Hypothyroidism    Uncontrolled stage 2 hypertension    Subclinical hypothyroidism    Hyperlipidemia with target LDL less than 130    Osteopenia    Encounter for fitting or adjustment of implantable cardioverter-defibrillator (ICD)    HOCM (hypertrophic obstructive cardiomyopathy)    At risk for amiodarone toxicity with long term use    RBBB    Nonexudative senile macular degeneration of retina    Malignant neoplasm of upper-outer quadrant of right female breast    Aromatase inhibitor use    Typical atrial flutter         Pre-op Assessment    I have reviewed the Patient Summary Reports.      I have reviewed the Medications.     Review of Systems      Physical Exam  General:  Well nourished    Airway/Jaw/Neck:  Airway Findings: Mouth Opening: Normal Tongue: Normal  General Airway Assessment: Adult  Mallampati: II  Improves to II with phonation.  TM Distance: Normal, at least 6 cm      Dental:  Dental Findings: In tact   Chest/Lungs:  Chest/Lungs Findings: Clear to auscultation     Heart/Vascular:  Heart Findings: Rate: Normal  Rhythm: Regular Rhythm  Sounds: Normal        Mental Status:  Mental Status Findings:  Cooperative, Alert and Oriented         Anesthesia Plan  Type of Anesthesia, risks & benefits discussed:  Anesthesia Type:  general  Patient's Preference: General  Intra-op Monitoring Plan: standard ASA monitors  Intra-op  Monitoring Plan Comments: Standard ASA monitors.   Post Op Pain Control Plan: per primary service following discharge from PACU  Post Op Pain Control Plan Comments: Per primary service.     Induction:   IV  Beta Blocker:  Patient is not currently on a Beta-Blocker (No further documentation required).       Informed Consent: Patient understands risks and agrees with Anesthesia plan.  Questions answered. Anesthesia consent signed with patient.  ASA Score: 3     Day of Surgery Review of History & Physical:    H&P update referred to the surgeon.     Anesthesia Plan Notes: Chart reviewed, patient interviewed and examined.        Ready For Surgery From Anesthesia Perspective.

## 2018-02-26 NOTE — ANESTHESIA POSTPROCEDURE EVALUATION
"Anesthesia Post Evaluation    Patient: Karena Young    Procedure(s) Performed: Procedure(s) (LRB):  ABLATION (N/A)    Final Anesthesia Type: general  Patient location during evaluation: PACU  Patient participation: Yes- Able to Participate  Level of consciousness: awake and alert and oriented  Post-procedure vital signs: reviewed and stable  Pain management: adequate  Airway patency: patent  PONV status at discharge: No PONV  Anesthetic complications: no      Cardiovascular status: blood pressure returned to baseline and hemodynamically stable  Respiratory status: unassisted and spontaneous ventilation  Hydration status: euvolemic  Follow-up not needed.        Visit Vitals  BP (!) 116/58 (BP Location: Right arm, Patient Position: Lying)   Pulse 66   Temp 36.4 °C (97.5 °F) (Oral)   Resp 18   Ht 5' 4" (1.626 m)   Wt 54 kg (119 lb)   SpO2 97%   Breastfeeding? No   BMI 20.43 kg/m²       Pain/Wendy Score: Pain Assessment Performed: Yes (2/26/2018 10:06 AM)  Presence of Pain: denies (2/26/2018 12:00 PM)  Wendy Score: 10 (2/26/2018  9:45 AM)      "

## 2018-02-26 NOTE — PROGRESS NOTES
Patient discharged per MD orders. Instructions given on medications, wound care, activity, signs of infection, when to call MD, and follow up appointments. Pt verbalized understanding. Telemetry and PIV removed. Patient and family waiting for wheelchair.

## 2018-02-26 NOTE — TRANSFER OF CARE
"Anesthesia Transfer of Care Note    Patient: Karena Young    Procedure(s) Performed: Procedure(s) (LRB):  ABLATION (N/A)    Patient location: PACU    Anesthesia Type: general    Transport from OR: Transported from OR on room air with adequate spontaneous ventilation    Post pain: adequate analgesia    Post assessment: no apparent anesthetic complications and tolerated procedure well    Post vital signs: stable    Level of consciousness: awake    Nausea/Vomiting: no nausea/vomiting    Complications: none    Transfer of care protocol was followed      Last vitals:   Visit Vitals  /68 (BP Location: Right arm, Patient Position: Lying)   Pulse 65   Temp 35.9 °C (96.6 °F) (Oral)   Resp 18   Ht 5' 4" (1.626 m)   Wt 54 kg (119 lb)   SpO2 98%   Breastfeeding? No   BMI 20.43 kg/m²     "

## 2018-02-26 NOTE — PLAN OF CARE
Problem: Patient Care Overview  Goal: Plan of Care Review  Outcome: Ongoing (interventions implemented as appropriate)  Received report from ZULMA Torrez. Patient s/p RFA, AAOx3. VSS, no c/o pain or discomfort at this time, resp even and unlabored. Gauze/tegaderm dressing to R groin is CDI. No active bleeding. No hematoma noted. Post procedure protocol reviewed with patient and patient's family. Understanding verbalized. Family members at bedside. Nurse call bell within reach. Will continue to monitor per post procedure protocol.

## 2018-02-26 NOTE — DISCHARGE SUMMARY
"[facility]  Cardiac Electrophysiology  Discharge Summary      Patient Name: Karena Young  MRN: [id number]  Admission Date: 2/26/2018  Hospital Length of Stay: 0 days  Discharge Date and Time: 2/26/2018  3:35 PM  Attending Physician: MARC Hauser MD   Discharging Provider: Ana Morejon NP  Primary Care Physician: Lee Dwyer MD    HPI: Karena Young 78 y.o. History of HOCM s/p ETOH ablation 4/06, ventricular tachycardia, ICD, HTN, orthostatic hypotension, and HLP.Also noted to have transient A Flutter and AF in 2009 and 2010. Has had no AF since amiodarone -- She also later had VT and is on amio for that now -- since 2012   ICD initially implanted by Dr Gonzales in 11/04 for syncope with VTNS>>replaced in 6/2008   Had a syncopal spell 2/20/12 -- ICD revealed event at 3:44PM-- 4 sec VTNS, also had NSVT x 7; longest 7 seconds on 1/1/12>>started on amiodarone and most recent ICD interrogation revealed no NSVT. She has had no further syncope and is without complaints of dizziness. She denies any palpitations, sob, chest pain, or edema.   In past had AFl as well as AF noted by ICD and ATPed--   Mild CKD -- Cr 1.3 to 1.4 -- stable   CHADS=1 (HTN) on aspirin   DLCO normal 6/5/12, 6/2014 82%, FVC 73% TSH 6/15 was WNL -- 2.5  Had ICD replacement and lead was buried deeper in pocket In August 2013 -- she is doing well and feels quite good --her main c/o at this time is "hallcucinations" at night. She has some HOBBS (climbing stairs etc -- class II)   ICD eval today shows no dysrhythmias   V pacing was 0% and A was 76%-- battery at [facility]-- she had an ICD shock in may during breast bx / cautery -- there were multiple noise pick ups but only one shock-- she was asleep.  Her only c/o is occ orthostatic dizziness -- this is mild but noticeable -- BPs at home are 100-110/60 to 70  She has no SOB and she can walk ~ a block or so s issues    Procedure(s) (LRB):  ABLATION (N/A)     Hospital " Course:  AFlutter Ablation procedure canceled as patient noted to be in AFib per intracardiac readings  ( see procedure note for details). Tolerated procedure with no acute complications. Tolerating diet, ambulating, voiding, pain well controlled. Right groin soft with no bleeding or hematoma.  Eliquis dose given prior to discharge home. EP clinic to coordinate scheduling PVI-Ablation procedure. Discharge plans/instructions discussed with patient and family who verbalized understanding and agreement with plans of care. No further questions or concerns voiced at this time.    Consults: Anesthesia    Significant Diagnostic Studies: Labs:     Pending Diagnostic Studies:     None          Final Active Diagnoses:    Diagnosis Date Noted POA    PRINCIPAL PROBLEM:  Atrial flutter [I48.92] 09/12/2012 Yes      Problems Resolved During this Admission:    Diagnosis Date Noted Date Resolved POA     Discharged Condition: good    Disposition: Home or Self Care    Follow Up:  Follow-up Information     Call Jake Hauser MD.    Specialties:  Electrophysiology, Cardiology  Why:  EP clinic to coordinate return for PVI-Ablation procedure  Contact information:  [address] [place] [id number]  [phone]                 Patient Instructions:     Diet Adult Regular     Activity as tolerated     Lifting restrictions   Order Comments: No lifting more than 5-10 pounds x 1 week     Call MD for:  temperature >100.4     Call MD for:  persistent nausea and vomiting or diarrhea     Call MD for:  severe uncontrolled pain     Call MD for:  redness, tenderness, or signs of infection (pain, swelling, redness, odor or green/yellow discharge around incision site)     Call MD for:  difficulty breathing or increased cough     Call MD for:  severe persistent headache     Call MD for:  worsening rash     Call MD for:  persistent dizziness, light-headedness, or visual disturbances     Call MD for:  increased confusion or weakness      Call MD for:   Order Comments: Any concerns regarding procedure     Change dressing (specify)   Order Comments: May remove dressings tonight. No tub baths or soaking in water x 3 days.       Medications:  Reconciled Home Medications:   Discharge Medication List as of 2/26/2018  3:08 PM      CONTINUE these medications which have CHANGED    Details   apixaban 5 mg Tab Take 1 tablet (5 mg total) by mouth 2 (two) times daily. Take next dose of medication tonight (2/26/18) between 11pm and 12MN, Starting Mon 2/26/2018, Normal         CONTINUE these medications which have NOT CHANGED    Details   amiodarone (PACERONE) 200 MG Tab Take 1 tablet (200 mg total) by mouth once daily., Starting Thu 10/12/2017, Normal      aspirin 81 mg Tab Take 1 tablet by mouth Daily. , Until Discontinued, Historical Med      atorvastatin (LIPITOR) 20 MG tablet TAKE 1 TABLET BY MOUTH EVERY DAY, Normal      B2/VITS A,C,E/LUT/ZEAXANTH/MIN (ICAPS ORAL) Take by mouth 2 (two) times daily., Historical Med      calcium citrate-vitamin D3 315-200 mg (CITRACAL+D) 315-200 mg-unit per tablet Take 1 tablet by mouth 2 (two) times daily.  , Until Discontinued, Historical Med      cholecalciferol, vitamin D3, 1,000 unit capsule Take 1 capsule (1,000 Units total) by mouth once daily., Starting 1/22/2016, Until Discontinued, OTC      donepezil (ARICEPT) 10 MG tablet Take 1 tablet (10 mg total) by mouth every evening., Starting Fri 5/5/2017, Until Sat 5/5/2018, Normal      letrozole (FEMARA) 2.5 mg Tab TAKE 1 TABLET(2.5 MG) BY MOUTH EVERY DAY, Normal      lisinopril 10 MG tablet TAKE 1 TABLET BY MOUTH EVERY DAY, Normal      metoprolol tartrate (LOPRESSOR) 100 MG tablet Take 1 tablet (100 mg total) by mouth 2 (two) times daily., Starting Sun 11/12/2017, Until Mon 11/12/2018, Normal      diclofenac sodium (SOLARAZE) 3 % gel Apply to right hip as needed for pain daily, Normal      walker (ULTRA-LIGHT ROLLATOR) Misc 1 each by Misc.(Non-Drug; Combo Route) route  daily as needed., Starting Tue 7/19/2016, OTC           Ana Morejon NP  Cardiac Electrophysiology  [facility]

## 2018-02-26 NOTE — NURSING TRANSFER
Nursing Transfer Note      2/26/2018     Transfer To: short stay 4    Transfer via stretcher    Transfer with cardiac monitoring    Transported by abel herndon    Medicines sent: silvadene cream    Chart send with patient: Yes    Notified: nurse    Patient reassessed at: see epic (date, time)

## 2018-03-03 PROBLEM — I48.19 PERSISTENT ATRIAL FIBRILLATION: Status: ACTIVE | Noted: 2018-03-03

## 2018-03-15 ENCOUNTER — CLINICAL SUPPORT (OUTPATIENT)
Dept: ELECTROPHYSIOLOGY | Facility: CLINIC | Age: 78
End: 2018-03-15
Attending: INTERNAL MEDICINE
Payer: COMMERCIAL

## 2018-03-15 DIAGNOSIS — Z95.810 PRESENCE OF AUTOMATIC CARDIOVERTER/DEFIBRILLATOR (AICD): ICD-10-CM

## 2018-03-15 PROCEDURE — 93296 REM INTERROG EVL PM/IDS: CPT | Mod: S$GLB,,, | Performed by: INTERNAL MEDICINE

## 2018-03-15 PROCEDURE — 93295 DEV INTERROG REMOTE 1/2/MLT: CPT | Mod: S$GLB,,, | Performed by: INTERNAL MEDICINE

## 2018-03-21 ENCOUNTER — TELEPHONE (OUTPATIENT)
Dept: ELECTROPHYSIOLOGY | Facility: CLINIC | Age: 78
End: 2018-03-21

## 2018-03-21 NOTE — TELEPHONE ENCOUNTER
----- Message from Jake Hauser MD sent at 3/20/2018  8:45 PM CDT -----  Contact: Patient   IO think we talked about this -- Cryo   ----- Message -----  From: Ernestina Alejandro RN  Sent: 3/5/2018   5:11 PM  To: Jake Hauser MD    Please advise on plan for aMAZE or PVI as soon as possible  ----- Message -----  From: Ivette Ramirez  Sent: 3/5/2018   3:24 PM  To: Ernestina Alejandro RN    The Pt is calling to see if Dr. Keke Aguilar  Wants to do another procedure on her mom. Please call her back @ [phone]. Thanks, Ivette

## 2018-04-03 ENCOUNTER — TELEPHONE (OUTPATIENT)
Dept: ELECTROPHYSIOLOGY | Facility: CLINIC | Age: 78
End: 2018-04-03

## 2018-04-03 DIAGNOSIS — I48.19 PERSISTENT ATRIAL FIBRILLATION: Primary | ICD-10-CM

## 2018-04-03 NOTE — TELEPHONE ENCOUNTER
----- Message from Ivette Ramirez sent at 4/3/2018  2:56 PM CDT -----  Contact: Patient's daughter  The Pt's daughter is calling to see when her mother is having her next surgery? She states that the doctor told her and her brother that she will require another surgery PVI. Please call her back @ [phone]. Thanks, Ivette

## 2018-04-05 ENCOUNTER — PATIENT OUTREACH (OUTPATIENT)
Dept: ADMINISTRATIVE | Facility: HOSPITAL | Age: 78
End: 2018-04-05

## 2018-04-05 DIAGNOSIS — I10 ESSENTIAL HYPERTENSION: Primary | ICD-10-CM

## 2018-04-05 DIAGNOSIS — M89.9 BONE DISORDER: ICD-10-CM

## 2018-04-06 ENCOUNTER — HOSPITAL ENCOUNTER (OUTPATIENT)
Dept: RADIOLOGY | Facility: HOSPITAL | Age: 78
Discharge: HOME | End: 2018-04-06
Attending: INTERNAL MEDICINE
Payer: COMMERCIAL

## 2018-04-06 DIAGNOSIS — I48.19 PERSISTENT ATRIAL FIBRILLATION: ICD-10-CM

## 2018-04-06 PROCEDURE — 71275 CT ANGIOGRAPHY CHEST: CPT | Mod: TC

## 2018-04-06 PROCEDURE — 71275 CT ANGIOGRAPHY CHEST: CPT | Mod: 26,,, | Performed by: RADIOLOGY

## 2018-04-06 PROCEDURE — 25500020 PHARM REV CODE 255: Performed by: INTERNAL MEDICINE

## 2018-04-06 RX ADMIN — IOHEXOL 75 ML: 350 INJECTION, SOLUTION INTRAVENOUS at 02:04

## 2018-04-08 RX ORDER — ATORVASTATIN CALCIUM 20 MG/1
TABLET, FILM COATED ORAL
Qty: 90 TABLET | Refills: 3 | Status: SHIPPED | OUTPATIENT
Start: 2018-04-08 | End: 2019-04-04 | Stop reason: SDUPTHER

## 2018-04-17 ENCOUNTER — OFFICE VISIT (OUTPATIENT)
Dept: INTERNAL MEDICINE | Facility: CLINIC | Age: 78
End: 2018-04-17
Payer: COMMERCIAL

## 2018-04-17 VITALS
BODY MASS INDEX: 21.68 KG/M2 | HEIGHT: 63 IN | TEMPERATURE: 99 F | HEART RATE: 65 BPM | DIASTOLIC BLOOD PRESSURE: 70 MMHG | SYSTOLIC BLOOD PRESSURE: 146 MMHG | OXYGEN SATURATION: 98 % | WEIGHT: 122.38 LBS

## 2018-04-17 DIAGNOSIS — R30.0 DYSURIA: Primary | ICD-10-CM

## 2018-04-17 DIAGNOSIS — I48.20 CHRONIC ATRIAL FIBRILLATION: ICD-10-CM

## 2018-04-17 DIAGNOSIS — I42.1 HOCM (HYPERTROPHIC OBSTRUCTIVE CARDIOMYOPATHY): ICD-10-CM

## 2018-04-17 DIAGNOSIS — N18.30 CKD (CHRONIC KIDNEY DISEASE) STAGE 3, GFR 30-59 ML/MIN: ICD-10-CM

## 2018-04-17 DIAGNOSIS — R41.3 MEMORY LOSS: ICD-10-CM

## 2018-04-17 DIAGNOSIS — I10 ESSENTIAL HYPERTENSION: ICD-10-CM

## 2018-04-17 LAB
BILIRUB UR QL STRIP: NEGATIVE
CLARITY UR REFRACT.AUTO: CLEAR
COLOR UR AUTO: NORMAL
GLUCOSE UR QL STRIP: NEGATIVE
HGB UR QL STRIP: NEGATIVE
KETONES UR QL STRIP: NEGATIVE
LEUKOCYTE ESTERASE UR QL STRIP: NEGATIVE
MICROSCOPIC COMMENT: NORMAL
NITRITE UR QL STRIP: NEGATIVE
PH UR STRIP: 5 [PH] (ref 5–8)
PROT UR QL STRIP: NEGATIVE
RBC #/AREA URNS AUTO: 0 /HPF (ref 0–4)
SP GR UR STRIP: 1 (ref 1–1.03)
SQUAMOUS #/AREA URNS AUTO: 0 /HPF
URN SPEC COLLECT METH UR: NORMAL
UROBILINOGEN UR STRIP-ACNC: NEGATIVE EU/DL

## 2018-04-17 PROCEDURE — 81001 URINALYSIS AUTO W/SCOPE: CPT

## 2018-04-17 PROCEDURE — 3078F DIAST BP <80 MM HG: CPT | Mod: CPTII,S$GLB,, | Performed by: FAMILY MEDICINE

## 2018-04-17 PROCEDURE — 99214 OFFICE O/P EST MOD 30 MIN: CPT | Mod: S$GLB,,, | Performed by: FAMILY MEDICINE

## 2018-04-17 PROCEDURE — 87086 URINE CULTURE/COLONY COUNT: CPT

## 2018-04-17 PROCEDURE — 99999 PR PBB SHADOW E&M-EST. PATIENT-LVL III: CPT | Mod: PBBFAC,,, | Performed by: FAMILY MEDICINE

## 2018-04-17 PROCEDURE — 3077F SYST BP >= 140 MM HG: CPT | Mod: CPTII,S$GLB,, | Performed by: FAMILY MEDICINE

## 2018-04-19 LAB — BACTERIA UR CULT: NO GROWTH

## 2018-04-19 NOTE — PROGRESS NOTES
"Subjective:      Patient ID: Karena Young is a 78 y.o. female.    Chief Complaint:  F/u chronic conditions    HPI  77 yo female with dementia, HOCM, HTN here for f/u with her daughter.  HAs had a few ER visits.  One for UTI and then other for heart.  Will be having a procedure soon.  Concerned about the UTI being fully gone.  Memory seems to be worsening.  She is living in her own place across from her daughter, she has a sitter there with her.   No CP/SOB.  Having some loose stools past few days.  No N/V.  Appetite normal.  Weight is up a few lbs that she had dropped.    BP up just a bit, normally though it runs on the lower side.      Past Medical History:   Diagnosis Date    *Atrial fibrillation     *Atrial flutter     Anticoagulant long-term use     Breast cancer     Cardiomyopathy     HOCM (hypertrophic obstructive cardiomyopathy) 9/6/2013    Hyperlipidemia     Hypertension     Macular degeneration     Orthostatic hypotension     Ventricular tachycardia      Family History   Problem Relation Age of Onset    Cancer Mother     Heart disease Mother     Heart disease Father     Cancer Brother 60     Past Surgical History:   Procedure Laterality Date    BREAST BIOPSY      CARDIAC ELECTROPHYSIOLOGY STUDY AND ABLATION  4/06    CATARACT EXTRACTION      ICD implantation      INSERT / REPLACE / REMOVE PACEMAKER       Social History   Substance Use Topics    Smoking status: Never Smoker    Smokeless tobacco: Never Used    Alcohol use No       BP (!) 146/70   Pulse 65   Temp 98.6 °F (37 °C) (Tympanic)   Ht 5' 3.25" (1.607 m)   Wt 55.5 kg (122 lb 5.7 oz)   SpO2 98%   BMI 21.50 kg/m²     Review of Systems   Constitutional: Negative for activity change and unexpected weight change.   HENT: Positive for rhinorrhea. Negative for hearing loss and trouble swallowing.    Eyes: Negative for discharge and visual disturbance.   Respiratory: Negative for chest tightness and wheezing.  "   Cardiovascular: Negative for chest pain and palpitations.   Gastrointestinal: Positive for diarrhea. Negative for blood in stool, constipation and vomiting.   Endocrine: Negative for polydipsia and polyuria.   Genitourinary: Negative for difficulty urinating, dysuria, hematuria and menstrual problem.   Musculoskeletal: Positive for arthralgias. Negative for joint swelling and neck pain.   Neurological: Negative for weakness and headaches.   Psychiatric/Behavioral: Positive for confusion. Negative for dysphoric mood.       Objective:     Physical Exam   Constitutional: She appears well-developed and well-nourished. No distress.   HENT:   Right Ear: External ear normal.   Left Ear: External ear normal.   Nose: Nose normal.   Mouth/Throat: Oropharynx is clear and moist.   Eyes: Conjunctivae are normal. Pupils are equal, round, and reactive to light.   Neck: Normal range of motion. Neck supple. Carotid bruit is not present.   Cardiovascular: Normal rate and regular rhythm.    Pulmonary/Chest: Effort normal and breath sounds normal. No respiratory distress. She has no wheezes. She has no rales.   Abdominal: Soft. Bowel sounds are normal. She exhibits no distension. There is no tenderness. There is no guarding.   Musculoskeletal: She exhibits no edema.   Neurological: She is alert. No cranial nerve deficit.   Skin: Skin is warm and dry. No rash noted.   Psychiatric: She has a normal mood and affect. Her behavior is normal. Judgment and thought content normal.   Nursing note and vitals reviewed.      Lab Results   Component Value Date    WBC 5.54 02/22/2018    HGB 13.4 02/22/2018    HCT 41.5 02/22/2018     02/22/2018    CHOL 169 03/17/2016    TRIG 77 03/17/2016    HDL 56 03/17/2016    ALT 39 12/29/2017    AST 29 12/29/2017     02/22/2018    K 4.4 02/22/2018     02/22/2018    CREATININE 1.1 02/22/2018    BUN 23 02/22/2018    CO2 29 02/22/2018    TSH 2.845 11/11/2017    INR 1.0 02/22/2018        Assessment:     1. Dysuria    2. HOCM (hypertrophic obstructive cardiomyopathy)    3. Chronic atrial fibrillation    4. CKD (chronic kidney disease) stage 3, GFR 30-59 ml/min    5. Essential hypertension    6. Memory loss         Plan:     Dysuria  -     URINALYSIS  -     Urine culture; Future; Expected date: 04/17/2018    HOCM (hypertrophic obstructive cardiomyopathy)    Chronic atrial fibrillation    CKD (chronic kidney disease) stage 3, GFR 30-59 ml/min    Essential hypertension    Memory loss    Other orders  -     Urinalysis Microscopic    Bp mildly elevated, monitor at home.  UA/cx are clear  REviewed outside records  Cont current meds  FAll precautions  Drink more water  Follow-up in about 6 months (around 10/17/2018), or if symptoms worsen or fail to improve.

## 2018-04-23 DIAGNOSIS — I48.19 PERSISTENT ATRIAL FIBRILLATION: Primary | ICD-10-CM

## 2018-04-23 RX ORDER — WARFARIN SODIUM 5 MG/1
5 TABLET ORAL DAILY
Qty: 30 TABLET | Refills: 12 | Status: ON HOLD | OUTPATIENT
Start: 2018-04-23 | End: 2018-05-15

## 2018-04-27 ENCOUNTER — PATIENT MESSAGE (OUTPATIENT)
Dept: ELECTROPHYSIOLOGY | Facility: CLINIC | Age: 78
End: 2018-04-27

## 2018-04-27 DIAGNOSIS — I48.19 PERSISTENT ATRIAL FIBRILLATION: Primary | ICD-10-CM

## 2018-04-27 NOTE — PROGRESS NOTES
ABLATION EDUCATION CHECKLIST    05/08/2018  PRE - PROCEDURE LABS HAVE BEEN ORDERED FOR YOU @ [facility]  BE SURE TO ARRIVE AT YOUR SCHEDULED TIME FOR THIS LAB WORK!  (YOU DO NOT HAVE TO FAST FOR THIS LABWORK!!!!)    05/14/2018 @ 8 AM Report to Cardiology Waiting Room on 3rd floor of the Hospital      Do not eat or drink anything after: 12 mn on the night before your procedure    Medications:   You may take your usual morning medications with a sip of water (including Aspirin).    Directions to the Cardiology Waiting Room  If you park in the Parking Garage:  Take elevators to the 2nd floor  Walk up ramp and turn right by Gold Elevators  Take elevator to the 3rd floor  Upon exiting the elevator, turn away from the clinic areas  Walk long sandoval around to front of hospital to area with windows overlooking [facility]  Check in at Reception Desk  OR  If family is dropping you off:  Have them drop you off at the front of the Hospital  (Near the ER, where all the flags are hung).  Take the E elevators to the 3rd floor.  Check in at the Reception Desk in the waiting room.      You will be spending the night after your procedure  You will need someone to drive you home the day after your procedure.    Your pain during your procedure will be managed by the anesthesia team.     Any need to reschedule or cancel procedures, or any questions regarding your procedures should be addressed directly with the Arrhythmia Department Nurses at the following phone number: [phone]    THE ABOVE INSTRUCTIONS WERE GIVEN TO THE PATIENT VERBALLY AND THEY VERBALIZED UNDERSTANDING.  THEY DO NOT REQUIRE ANY SPECIAL NEEDS AND DO NOT HAVE ANY LEARNING BARRIERS.

## 2018-04-30 ENCOUNTER — ANTI-COAG VISIT (OUTPATIENT)
Dept: CARDIOLOGY | Facility: CLINIC | Age: 78
End: 2018-04-30
Payer: COMMERCIAL

## 2018-04-30 ENCOUNTER — LAB VISIT (OUTPATIENT)
Dept: LAB | Facility: HOSPITAL | Age: 78
End: 2018-04-30
Attending: INTERNAL MEDICINE
Payer: COMMERCIAL

## 2018-04-30 DIAGNOSIS — I48.19 PERSISTENT ATRIAL FIBRILLATION: ICD-10-CM

## 2018-04-30 DIAGNOSIS — T45.514A POISONING BY WARFARIN SODIUM, UNDETERMINED INTENT, INITIAL ENCOUNTER: Primary | ICD-10-CM

## 2018-04-30 DIAGNOSIS — Z79.01 LONG TERM (CURRENT) USE OF ANTICOAGULANTS: Primary | ICD-10-CM

## 2018-04-30 DIAGNOSIS — Z79.01 LONG TERM (CURRENT) USE OF ANTICOAGULANTS: ICD-10-CM

## 2018-04-30 LAB
INR PPP: >10
PROTHROMBIN TIME: >100 SEC

## 2018-04-30 PROCEDURE — 85610 PROTHROMBIN TIME: CPT | Mod: PO

## 2018-04-30 PROCEDURE — 36415 COLL VENOUS BLD VENIPUNCTURE: CPT | Mod: PO

## 2018-04-30 RX ORDER — PHYTONADIONE 5 MG/1
5 TABLET ORAL ONCE
Qty: 1 TABLET | Refills: 0 | Status: SHIPPED | OUTPATIENT
Start: 2018-04-30 | End: 2018-04-30

## 2018-04-30 NOTE — PROGRESS NOTES
New patient to the Coumadin Clinic.  Patient is taking Eliquis.  Started Warfarin on Wednesday, 4/25/2018 along with Eliquis.  Orders to stop Eliquis once INR is >2.0 and continue Warfarin.  Two INR testings attempted in the Coumadin Clinic, but was unsuccessful in receiving results.  Reports no active bleeding at present.  Patient was sent to the lab for testing confirmation.  Awaiting results.  Patient and patient's daughter, Alexandra was instructed to hold Eliquis and Warfarin today, until INR results are received and called with further instructions.  Coumadin education/materials were given.

## 2018-04-30 NOTE — PROGRESS NOTES
I was called by the lab for a stat inr >10 .talked to patient's daughter Chaitanya after discussing the case with DR BOATENG. The patient is scheduled for ablation on 5/14 will give vitamin k 5 mg pox1.  The patient has no signs of bleeding.

## 2018-05-01 ENCOUNTER — TELEPHONE (OUTPATIENT)
Dept: CARDIOLOGY | Facility: CLINIC | Age: 78
End: 2018-05-01

## 2018-05-01 NOTE — TELEPHONE ENCOUNTER
"Contacted Ms Morris (daughter) this morning, stated "pharmacy did not have Vitamin K (oral) in stock", but pharmacist will be contacting other pharmacies.  Informed Ms. Morris that we have the Vitamin K in the Coumadin Clinic.  She would like to contact the pharmacist this morning to check on the Vitamin K and if not, then she will call me back and make arrangement to bring patient to the Coumadin Clinic at [facility].  I stressed the importance of the Vitamin K to be given today.  Ms. Morris verbalized understanding.  Also, patient should seek medical attention (ER) for any abnormal bleeding, if needed.  "

## 2018-05-01 NOTE — PROGRESS NOTES
Hold coumadin and repeat INR in 2 days. Patient unable to get vit K that was ordered by on call MD. Its ok to just hold coumadin considering no bleeding. Additionally, we do not want to reverse INR while pending ablation. Will allow INR to decrease in time. We have to consider interaction with Eliquis on INR, so with holding both INR should decrease nicely. Additionally, we will have her eat a serving of greens to get vit K that way.

## 2018-05-02 ENCOUNTER — ANTI-COAG VISIT (OUTPATIENT)
Dept: CARDIOLOGY | Facility: CLINIC | Age: 78
End: 2018-05-02
Payer: COMMERCIAL

## 2018-05-02 DIAGNOSIS — Z79.01 LONG TERM (CURRENT) USE OF ANTICOAGULANTS: Primary | ICD-10-CM

## 2018-05-02 LAB — INR PPP: 4.6 (ref 2–3)

## 2018-05-02 PROCEDURE — 85610 PROTHROMBIN TIME: CPT | Mod: QW,S$GLB,, | Performed by: INTERNAL MEDICINE

## 2018-05-02 NOTE — PROGRESS NOTES
Patient's INR is supra therapeutic at 4.6.  Reports no active bleeding at present.  Eliquis - d/unique on 4/30/2018.  Instructed to hold Warfarin today and tomorrow, then start on Friday, 5/04/2018 with 5 mg daily.  Recheck in lab on Tuesday, 5/08/2014.

## 2018-05-08 ENCOUNTER — LAB VISIT (OUTPATIENT)
Dept: LAB | Facility: HOSPITAL | Age: 78
End: 2018-05-08
Attending: INTERNAL MEDICINE
Payer: COMMERCIAL

## 2018-05-08 ENCOUNTER — TELEPHONE (OUTPATIENT)
Dept: CARDIOLOGY | Facility: HOSPITAL | Age: 78
End: 2018-05-08

## 2018-05-08 DIAGNOSIS — I48.19 PERSISTENT ATRIAL FIBRILLATION: ICD-10-CM

## 2018-05-08 LAB
ANION GAP SERPL CALC-SCNC: 7 MMOL/L
APTT BLDCRRT: 35.6 SEC
BASOPHILS # BLD AUTO: 0.03 K/UL
BASOPHILS NFR BLD: 0.6 %
BUN SERPL-MCNC: 22 MG/DL
CALCIUM SERPL-MCNC: 9.8 MG/DL
CHLORIDE SERPL-SCNC: 101 MMOL/L
CO2 SERPL-SCNC: 31 MMOL/L
CREAT SERPL-MCNC: 1.3 MG/DL
DIFFERENTIAL METHOD: ABNORMAL
EOSINOPHIL # BLD AUTO: 0.1 K/UL
EOSINOPHIL NFR BLD: 1 %
ERYTHROCYTE [DISTWIDTH] IN BLOOD BY AUTOMATED COUNT: 14.1 %
EST. GFR  (AFRICAN AMERICAN): 45.4 ML/MIN/1.73 M^2
EST. GFR  (NON AFRICAN AMERICAN): 39.4 ML/MIN/1.73 M^2
GLUCOSE SERPL-MCNC: 110 MG/DL
HCT VFR BLD AUTO: 43.7 %
HGB BLD-MCNC: 14 G/DL
IMM GRANULOCYTES # BLD AUTO: 0.01 K/UL
IMM GRANULOCYTES NFR BLD AUTO: 0.2 %
INR PPP: 7.1
LYMPHOCYTES # BLD AUTO: 0.6 K/UL
LYMPHOCYTES NFR BLD: 12.2 %
MCH RBC QN AUTO: 31 PG
MCHC RBC AUTO-ENTMCNC: 32 G/DL
MCV RBC AUTO: 97 FL
MONOCYTES # BLD AUTO: 0.4 K/UL
MONOCYTES NFR BLD: 7.6 %
NEUTROPHILS # BLD AUTO: 4.1 K/UL
NEUTROPHILS NFR BLD: 78.4 %
NRBC BLD-RTO: 0 /100 WBC
PLATELET # BLD AUTO: 204 K/UL
PMV BLD AUTO: 11.2 FL
POTASSIUM SERPL-SCNC: 5 MMOL/L
PROTHROMBIN TIME: 71.5 SEC
RBC # BLD AUTO: 4.52 M/UL
SODIUM SERPL-SCNC: 139 MMOL/L
WBC # BLD AUTO: 5.23 K/UL

## 2018-05-08 PROCEDURE — 36415 COLL VENOUS BLD VENIPUNCTURE: CPT | Mod: PO

## 2018-05-08 PROCEDURE — 80048 BASIC METABOLIC PNL TOTAL CA: CPT

## 2018-05-08 PROCEDURE — 85730 THROMBOPLASTIN TIME PARTIAL: CPT

## 2018-05-08 PROCEDURE — 85610 PROTHROMBIN TIME: CPT

## 2018-05-08 PROCEDURE — 85025 COMPLETE CBC W/AUTO DIFF WBC: CPT

## 2018-05-09 ENCOUNTER — ANTI-COAG VISIT (OUTPATIENT)
Dept: CARDIOLOGY | Facility: CLINIC | Age: 78
End: 2018-05-09

## 2018-05-09 DIAGNOSIS — I48.19 PERSISTENT ATRIAL FIBRILLATION: ICD-10-CM

## 2018-05-09 NOTE — PROGRESS NOTES
Patient's INR is supra therapeutic at 7.1  Contacted Alexandra (daughter) - reports no signs of bleeding.  Instructed to hold Warfarin dose until further instructions given by the Coumadin Clinic on follow up appointment.  Eat a dark leafy vegetable today. Recheck on Friday, 5/11/2018 at the Coumadin Clinic ([place]).  Seek medical attention (ED) for any abnormal bleeding, if needed.

## 2018-05-09 NOTE — TELEPHONE ENCOUNTER
Called about critical value of INR 7.1  Spoke with patient's daughter, who indicated pt was well without stigmata of bleeding.  Patient had already taken dose this evening.  I asked her to contact the coumadin clinic in the morning for further instruction.

## 2018-05-11 ENCOUNTER — ANTI-COAG VISIT (OUTPATIENT)
Dept: CARDIOLOGY | Facility: CLINIC | Age: 78
End: 2018-05-11
Payer: COMMERCIAL

## 2018-05-11 ENCOUNTER — TELEPHONE (OUTPATIENT)
Dept: ELECTROPHYSIOLOGY | Facility: CLINIC | Age: 78
End: 2018-05-11

## 2018-05-11 DIAGNOSIS — I48.19 PERSISTENT ATRIAL FIBRILLATION: ICD-10-CM

## 2018-05-11 DIAGNOSIS — Z79.01 LONG TERM (CURRENT) USE OF ANTICOAGULANTS: Primary | ICD-10-CM

## 2018-05-11 LAB — INR PPP: 6.3 (ref 2–3)

## 2018-05-11 PROCEDURE — 85610 PROTHROMBIN TIME: CPT | Mod: QW,S$GLB,, | Performed by: INTERNAL MEDICINE

## 2018-05-11 NOTE — PROGRESS NOTES
The pt's INR is elevated (6.3) today and she is scheduled for a PVI on Monday.  I am holding her coumadin for the weekend and advising two portions of greens while she holds.  Dr. Analisa Do's nurse would like to keep the pt on the schedule for Monday and knows the situation.

## 2018-05-11 NOTE — TELEPHONE ENCOUNTER
Spoke with patient's daughter regarding upcoming procedure on 5/14. Reviewed instructions and addressed concerns about Coumadin level. Daughter verbalized understanding of all discussed. Coumadin clinic notified, elevated INR to be addressed.

## 2018-05-14 ENCOUNTER — HOSPITAL ENCOUNTER (OUTPATIENT)
Dept: CARDIOLOGY | Facility: CLINIC | Age: 78
Discharge: HOME | End: 2018-05-14
Attending: INTERNAL MEDICINE | Admitting: INTERNAL MEDICINE
Payer: COMMERCIAL

## 2018-05-14 ENCOUNTER — HOSPITAL ENCOUNTER (OUTPATIENT)
Facility: HOSPITAL | Age: 78
Discharge: HOME | End: 2018-05-15
Attending: INTERNAL MEDICINE | Admitting: INTERNAL MEDICINE
Payer: COMMERCIAL

## 2018-05-14 ENCOUNTER — ANESTHESIA (OUTPATIENT)
Dept: MEDSURG UNIT | Facility: HOSPITAL | Age: 78
End: 2018-05-14
Payer: COMMERCIAL

## 2018-05-14 ENCOUNTER — ANESTHESIA EVENT (OUTPATIENT)
Dept: MEDSURG UNIT | Facility: HOSPITAL | Age: 78
End: 2018-05-14
Payer: COMMERCIAL

## 2018-05-14 ENCOUNTER — SURGERY (OUTPATIENT)
Age: 78
End: 2018-05-14

## 2018-05-14 DIAGNOSIS — I48.19 PERSISTENT ATRIAL FIBRILLATION: Primary | ICD-10-CM

## 2018-05-14 DIAGNOSIS — I48.91 AF (ATRIAL FIBRILLATION): ICD-10-CM

## 2018-05-14 LAB
ABO + RH BLD: NORMAL
AORTIC ATHEROMA: YES
AORTIC VALVE REGURGITATION: ABNORMAL
BLD GP AB SCN CELLS X3 SERPL QL: NORMAL
DIASTOLIC DYSFUNCTION: NO
INR PPP: 1.6
MITRAL VALVE REGURGITATION: ABNORMAL
PROTHROMBIN TIME: 16.3 SEC
RETIRED EF AND QEF - SEE NOTES: 55 (ref 55–65)
TRICUSPID VALVE REGURGITATION: ABNORMAL

## 2018-05-14 PROCEDURE — 63600175 PHARM REV CODE 636 W HCPCS: Performed by: NURSE ANESTHETIST, CERTIFIED REGISTERED

## 2018-05-14 PROCEDURE — 93320 DOPPLER ECHO COMPLETE: CPT | Mod: 26,,, | Performed by: INTERNAL MEDICINE

## 2018-05-14 PROCEDURE — 93010 ELECTROCARDIOGRAM REPORT: CPT | Mod: ,,, | Performed by: INTERNAL MEDICINE

## 2018-05-14 PROCEDURE — 63600175 PHARM REV CODE 636 W HCPCS: Performed by: NURSE PRACTITIONER

## 2018-05-14 PROCEDURE — 76937 US GUIDE VASCULAR ACCESS: CPT | Mod: 26,,, | Performed by: ANESTHESIOLOGY

## 2018-05-14 PROCEDURE — 63600175 PHARM REV CODE 636 W HCPCS

## 2018-05-14 PROCEDURE — 25000003 PHARM REV CODE 250: Performed by: NURSE PRACTITIONER

## 2018-05-14 PROCEDURE — 37000009 HC ANESTHESIA EA ADD 15 MINS: Performed by: INTERNAL MEDICINE

## 2018-05-14 PROCEDURE — D9220A PRA ANESTHESIA: Mod: ANES,,, | Performed by: ANESTHESIOLOGY

## 2018-05-14 PROCEDURE — 86850 RBC ANTIBODY SCREEN: CPT

## 2018-05-14 PROCEDURE — 36620 INSERTION CATHETER ARTERY: CPT | Mod: 59,,, | Performed by: ANESTHESIOLOGY

## 2018-05-14 PROCEDURE — 25000003 PHARM REV CODE 250

## 2018-05-14 PROCEDURE — 93005 ELECTROCARDIOGRAM TRACING: CPT | Mod: 59

## 2018-05-14 PROCEDURE — 85610 PROTHROMBIN TIME: CPT

## 2018-05-14 PROCEDURE — D9220A PRA ANESTHESIA: Mod: CRNA,,, | Performed by: NURSE ANESTHETIST, CERTIFIED REGISTERED

## 2018-05-14 PROCEDURE — 25000003 PHARM REV CODE 250: Performed by: ANESTHESIOLOGY

## 2018-05-14 PROCEDURE — 93312 ECHO TRANSESOPHAGEAL: CPT | Mod: 26,,, | Performed by: INTERNAL MEDICINE

## 2018-05-14 PROCEDURE — 63600175 PHARM REV CODE 636 W HCPCS: Performed by: ANESTHESIOLOGY

## 2018-05-14 PROCEDURE — 93325 DOPPLER ECHO COLOR FLOW MAPG: CPT | Mod: 26,,, | Performed by: INTERNAL MEDICINE

## 2018-05-14 PROCEDURE — 37000008 HC ANESTHESIA 1ST 15 MINUTES: Performed by: INTERNAL MEDICINE

## 2018-05-14 PROCEDURE — 93010 ELECTROCARDIOGRAM REPORT: CPT | Mod: 77,59,, | Performed by: INTERNAL MEDICINE

## 2018-05-14 PROCEDURE — 25500020 PHARM REV CODE 255

## 2018-05-14 PROCEDURE — 93325 DOPPLER ECHO COLOR FLOW MAPG: CPT

## 2018-05-14 PROCEDURE — 25000003 PHARM REV CODE 250: Performed by: NURSE ANESTHETIST, CERTIFIED REGISTERED

## 2018-05-14 RX ORDER — FENTANYL CITRATE 50 UG/ML
INJECTION, SOLUTION INTRAMUSCULAR; INTRAVENOUS
Status: DISCONTINUED | OUTPATIENT
Start: 2018-05-14 | End: 2018-05-14

## 2018-05-14 RX ORDER — LETROZOLE 2.5 MG/1
2.5 TABLET, FILM COATED ORAL DAILY
Status: DISCONTINUED | OUTPATIENT
Start: 2018-05-15 | End: 2018-05-15 | Stop reason: HOSPADM

## 2018-05-14 RX ORDER — WARFARIN 2.5 MG/1
2.5 TABLET ORAL DAILY
Status: DISCONTINUED | OUTPATIENT
Start: 2018-05-15 | End: 2018-05-15 | Stop reason: HOSPADM

## 2018-05-14 RX ORDER — SODIUM CHLORIDE 9 MG/ML
INJECTION, SOLUTION INTRAVENOUS CONTINUOUS
Status: DISCONTINUED | OUTPATIENT
Start: 2018-05-14 | End: 2018-05-15 | Stop reason: HOSPADM

## 2018-05-14 RX ORDER — ATORVASTATIN CALCIUM 20 MG/1
20 TABLET, FILM COATED ORAL NIGHTLY
Status: DISCONTINUED | OUTPATIENT
Start: 2018-05-14 | End: 2018-05-15 | Stop reason: HOSPADM

## 2018-05-14 RX ORDER — LIDOCAINE HCL/PF 100 MG/5ML
SYRINGE (ML) INTRAVENOUS
Status: DISCONTINUED | OUTPATIENT
Start: 2018-05-14 | End: 2018-05-14

## 2018-05-14 RX ORDER — HEPARIN SODIUM 1000 [USP'U]/ML
INJECTION, SOLUTION INTRAVENOUS; SUBCUTANEOUS
Status: DISCONTINUED | OUTPATIENT
Start: 2018-05-14 | End: 2018-05-14

## 2018-05-14 RX ORDER — AMIODARONE HYDROCHLORIDE 200 MG/1
200 TABLET ORAL DAILY
Status: DISCONTINUED | OUTPATIENT
Start: 2018-05-15 | End: 2018-05-15 | Stop reason: HOSPADM

## 2018-05-14 RX ORDER — DONEPEZIL HYDROCHLORIDE 10 MG/1
10 TABLET, FILM COATED ORAL NIGHTLY
Status: DISCONTINUED | OUTPATIENT
Start: 2018-05-14 | End: 2018-05-15 | Stop reason: HOSPADM

## 2018-05-14 RX ORDER — FENTANYL CITRATE 50 UG/ML
25 INJECTION, SOLUTION INTRAMUSCULAR; INTRAVENOUS EVERY 5 MIN PRN
Status: DISCONTINUED | OUTPATIENT
Start: 2018-05-14 | End: 2018-05-15 | Stop reason: HOSPADM

## 2018-05-14 RX ORDER — PANTOPRAZOLE SODIUM 40 MG/1
40 TABLET, DELAYED RELEASE ORAL DAILY
Status: DISCONTINUED | OUTPATIENT
Start: 2018-05-15 | End: 2018-05-15 | Stop reason: HOSPADM

## 2018-05-14 RX ORDER — CEFAZOLIN SODIUM 1 G/3ML
2 INJECTION, POWDER, FOR SOLUTION INTRAMUSCULAR; INTRAVENOUS
Status: COMPLETED | OUTPATIENT
Start: 2018-05-14 | End: 2018-05-14

## 2018-05-14 RX ORDER — OXYCODONE HYDROCHLORIDE 5 MG/1
5 TABLET ORAL
Status: DISCONTINUED | OUTPATIENT
Start: 2018-05-14 | End: 2018-05-15 | Stop reason: HOSPADM

## 2018-05-14 RX ORDER — NAPROXEN SODIUM 220 MG/1
81 TABLET, FILM COATED ORAL NIGHTLY
Status: DISCONTINUED | OUTPATIENT
Start: 2018-05-14 | End: 2018-05-15 | Stop reason: HOSPADM

## 2018-05-14 RX ORDER — SUCCINYLCHOLINE CHLORIDE 20 MG/ML
INJECTION INTRAMUSCULAR; INTRAVENOUS
Status: DISCONTINUED | OUTPATIENT
Start: 2018-05-14 | End: 2018-05-14

## 2018-05-14 RX ORDER — PROPOFOL 10 MG/ML
VIAL (ML) INTRAVENOUS
Status: DISCONTINUED | OUTPATIENT
Start: 2018-05-14 | End: 2018-05-14

## 2018-05-14 RX ORDER — SODIUM CHLORIDE 9 MG/ML
INJECTION, SOLUTION INTRAVENOUS CONTINUOUS PRN
Status: DISCONTINUED | OUTPATIENT
Start: 2018-05-14 | End: 2018-05-14

## 2018-05-14 RX ORDER — SODIUM CHLORIDE 0.9 % (FLUSH) 0.9 %
3 SYRINGE (ML) INJECTION
Status: DISCONTINUED | OUTPATIENT
Start: 2018-05-14 | End: 2018-05-15 | Stop reason: HOSPADM

## 2018-05-14 RX ORDER — PROTAMINE SULFATE 10 MG/ML
INJECTION, SOLUTION INTRAVENOUS
Status: DISCONTINUED | OUTPATIENT
Start: 2018-05-14 | End: 2018-05-14

## 2018-05-14 RX ORDER — DOPAMINE HYDROCHLORIDE 160 MG/100ML
INJECTION, SOLUTION INTRAVENOUS CONTINUOUS PRN
Status: DISCONTINUED | OUTPATIENT
Start: 2018-05-14 | End: 2018-05-14

## 2018-05-14 RX ORDER — PHENYLEPHRINE HYDROCHLORIDE 10 MG/ML
INJECTION INTRAVENOUS
Status: DISCONTINUED | OUTPATIENT
Start: 2018-05-14 | End: 2018-05-14

## 2018-05-14 RX ORDER — LISINOPRIL 10 MG/1
10 TABLET ORAL DAILY
Status: DISCONTINUED | OUTPATIENT
Start: 2018-05-15 | End: 2018-05-15 | Stop reason: HOSPADM

## 2018-05-14 RX ORDER — METOPROLOL TARTRATE 50 MG/1
100 TABLET ORAL 2 TIMES DAILY
Status: DISCONTINUED | OUTPATIENT
Start: 2018-05-14 | End: 2018-05-15 | Stop reason: HOSPADM

## 2018-05-14 RX ORDER — MIDAZOLAM HYDROCHLORIDE 1 MG/ML
INJECTION, SOLUTION INTRAMUSCULAR; INTRAVENOUS
Status: DISCONTINUED | OUTPATIENT
Start: 2018-05-14 | End: 2018-05-14

## 2018-05-14 RX ORDER — WARFARIN SODIUM 5 MG/1
5 TABLET ORAL DAILY
Status: COMPLETED | OUTPATIENT
Start: 2018-05-14 | End: 2018-05-14

## 2018-05-14 RX ORDER — ACETAMINOPHEN 325 MG/1
650 TABLET ORAL EVERY 6 HOURS PRN
Status: DISCONTINUED | OUTPATIENT
Start: 2018-05-14 | End: 2018-05-15 | Stop reason: HOSPADM

## 2018-05-14 RX ORDER — HEPARIN SODIUM 10000 [USP'U]/100ML
INJECTION, SOLUTION INTRAVENOUS CONTINUOUS PRN
Status: DISCONTINUED | OUTPATIENT
Start: 2018-05-14 | End: 2018-05-14

## 2018-05-14 RX ADMIN — SODIUM CHLORIDE 0.5 MCG/KG/MIN: 9 INJECTION, SOLUTION INTRAVENOUS at 03:05

## 2018-05-14 RX ADMIN — PHENYLEPHRINE HYDROCHLORIDE 100 MCG: 10 INJECTION INTRAVENOUS at 02:05

## 2018-05-14 RX ADMIN — SUCCINYLCHOLINE CHLORIDE 100 MG: 20 INJECTION, SOLUTION INTRAMUSCULAR; INTRAVENOUS at 02:05

## 2018-05-14 RX ADMIN — PROPOFOL 100 MG: 10 INJECTION, EMULSION INTRAVENOUS at 02:05

## 2018-05-14 RX ADMIN — METOPROLOL TARTRATE 100 MG: 50 TABLET ORAL at 08:05

## 2018-05-14 RX ADMIN — ASPIRIN 81 MG CHEWABLE TABLET 81 MG: 81 TABLET CHEWABLE at 08:05

## 2018-05-14 RX ADMIN — FENTANYL CITRATE 50 MCG: 50 INJECTION, SOLUTION INTRAMUSCULAR; INTRAVENOUS at 02:05

## 2018-05-14 RX ADMIN — CEFAZOLIN 2 G: 330 INJECTION, POWDER, FOR SOLUTION INTRAMUSCULAR; INTRAVENOUS at 03:05

## 2018-05-14 RX ADMIN — PROTAMINE SULFATE 1 MG: 10 INJECTION, SOLUTION INTRAVENOUS at 05:05

## 2018-05-14 RX ADMIN — DOPAMINE HYDROCHLORIDE IN DEXTROSE 5 MCG/KG/MIN: 1.6 INJECTION, SOLUTION INTRAVENOUS at 03:05

## 2018-05-14 RX ADMIN — DONEPEZIL HYDROCHLORIDE 10 MG: 10 TABLET ORAL at 11:05

## 2018-05-14 RX ADMIN — MIDAZOLAM 1 MG: 1 INJECTION INTRAMUSCULAR; INTRAVENOUS at 02:05

## 2018-05-14 RX ADMIN — MIDAZOLAM 1 MG: 1 INJECTION INTRAMUSCULAR; INTRAVENOUS at 04:05

## 2018-05-14 RX ADMIN — HEPARIN SODIUM AND DEXTROSE 1500 UNITS/HR: 10000; 5 INJECTION INTRAVENOUS at 04:05

## 2018-05-14 RX ADMIN — SODIUM CHLORIDE 1000 ML: 0.9 INJECTION, SOLUTION INTRAVENOUS at 10:05

## 2018-05-14 RX ADMIN — HEPARIN SODIUM 500 UNITS: 1000 INJECTION INTRAVENOUS; SUBCUTANEOUS at 04:05

## 2018-05-14 RX ADMIN — OXYCODONE HYDROCHLORIDE 5 MG: 5 TABLET ORAL at 08:05

## 2018-05-14 RX ADMIN — FENTANYL CITRATE 100 MCG: 50 INJECTION, SOLUTION INTRAMUSCULAR; INTRAVENOUS at 03:05

## 2018-05-14 RX ADMIN — WARFARIN SODIUM 5 MG: 5 TABLET ORAL at 10:05

## 2018-05-14 RX ADMIN — SODIUM CHLORIDE: 0.9 INJECTION, SOLUTION INTRAVENOUS at 01:05

## 2018-05-14 RX ADMIN — HEPARIN SODIUM 4000 UNITS: 1000 INJECTION INTRAVENOUS; SUBCUTANEOUS at 04:05

## 2018-05-14 RX ADMIN — LIDOCAINE HYDROCHLORIDE 100 MG: 20 INJECTION, SOLUTION INTRAVENOUS at 02:05

## 2018-05-14 RX ADMIN — PROTAMINE SULFATE 54 MG: 10 INJECTION, SOLUTION INTRAVENOUS at 05:05

## 2018-05-14 RX ADMIN — ATORVASTATIN CALCIUM 20 MG: 20 TABLET, FILM COATED ORAL at 08:05

## 2018-05-14 RX ADMIN — FENTANYL CITRATE 50 MCG: 50 INJECTION, SOLUTION INTRAMUSCULAR; INTRAVENOUS at 05:05

## 2018-05-14 RX ADMIN — FENTANYL CITRATE 25 MCG: 50 INJECTION, SOLUTION INTRAMUSCULAR; INTRAVENOUS at 07:05

## 2018-05-14 RX ADMIN — Medication 10 MG: at 06:05

## 2018-05-14 NOTE — PLAN OF CARE
Problem: Patient Care Overview  Goal: Plan of Care Review  Outcome: Ongoing (interventions implemented as appropriate)  Received report from ZULMA Matute. Patient s/p [facility], AAOx3. VSS, no c/o pain or discomfort at this time, resp even and unlabored. Gauze/tegaderm dressing to R groin is CDI. No active bleeding. No hematoma noted. Post procedure protocol reviewed with patient and patient's family. Understanding verbalized. Family members at bedside. Nurse call bell within reach. Will continue to monitor per post procedure protocol.

## 2018-05-14 NOTE — ANESTHESIA PREPROCEDURE EVALUATION
05/14/2018  Karena Young is a 78 y.o., female with HOCM s/p ETOH ablation 4/06, ventricular tachycardia, ICD, HTN, orthostatic hypotension, and HLP.Also noted to have transient A Flutter and AF in 2009 and 2010. Has had no AF since amiodarone -- She was planned for ALB ablation on 3/3/18 that was cancelled after patient was found to in Afib. She is here for MARIAELENA and possible PVI and ablation.      Pre-operative evaluation for Procedure(s) (LRB):  ABLATION (N/A)  TRANSESOPHAGEAL ECHOCARDIOGRAM (MARIAELENA) (N/A)        Patient Active Problem List   Diagnosis    Paroxysmal ventricular tachycardia    Implantable cardioverter-defibrillator (ICD) in situ    Paroxysmal atrial fibrillation    Atrial flutter    Orthostatic hypotension    HTN (hypertension)    Hypothyroidism    Uncontrolled stage 2 hypertension    Subclinical hypothyroidism    Hyperlipidemia with target LDL less than 130    Osteopenia    Encounter for fitting or adjustment of implantable cardioverter-defibrillator (ICD)    HOCM (hypertrophic obstructive cardiomyopathy)    At risk for amiodarone toxicity with long term use    RBBB    Nonexudative senile macular degeneration of retina    Malignant neoplasm of upper-outer quadrant of right female breast    Aromatase inhibitor use    Typical atrial flutter    Persistent atrial fibrillation       Review of patient's allergies indicates:  No Known Allergies    No current facility-administered medications on file prior to encounter.      Current Outpatient Prescriptions on File Prior to Encounter   Medication Sig Dispense Refill    amiodarone (PACERONE) 200 MG Tab Take 1 tablet (200 mg total) by mouth once daily. 30 tablet 11    apixaban 5 mg Tab Take 1 tablet (5 mg total) by mouth 2 (two) times daily. Take next dose of medication tonight (2/26/18) between 11pm and 12MN 60 tablet 2     aspirin 81 mg Tab Take 1 tablet by mouth every evening.       B2/VITS A,C,E/LUT/ZEAXANTH/MIN (ICAPS ORAL) Take by mouth 2 (two) times daily.      calcium citrate-vitamin D3 315-200 mg (CITRACAL+D) 315-200 mg-unit per tablet Take 1 tablet by mouth 2 (two) times daily.        cholecalciferol, vitamin D3, 1,000 unit capsule Take 1 capsule (1,000 Units total) by mouth once daily. 90 capsule 3    donepezil (ARICEPT) 10 MG tablet Take 1 tablet (10 mg total) by mouth every evening. 30 tablet 11    letrozole (FEMARA) 2.5 mg Tab TAKE 1 TABLET(2.5 MG) BY MOUTH EVERY DAY 30 tablet 5    lisinopril 10 MG tablet TAKE 1 TABLET BY MOUTH EVERY DAY 30 tablet 11    metoprolol tartrate (LOPRESSOR) 100 MG tablet Take 1 tablet (100 mg total) by mouth 2 (two) times daily. 60 tablet 11    diclofenac sodium (SOLARAZE) 3 % gel Apply to right hip as needed for pain daily 100 g 0    walker (ULTRA-LIGHT ROLLATOR) Misc 1 each by Misc.(Non-Drug; Combo Route) route daily as needed.  0       Past Surgical History:   Procedure Laterality Date    BREAST BIOPSY      CARDIAC ELECTROPHYSIOLOGY STUDY AND ABLATION  4/06    CATARACT EXTRACTION      ICD implantation      INSERT / REPLACE / REMOVE PACEMAKER         Social History     Social History    Marital status:      Spouse name: marina pinzon    Number of children: 3    Years of education: N/A     Occupational History    retired      Social History Main Topics    Smoking status: Never Smoker    Smokeless tobacco: Never Used    Alcohol use No    Drug use: No    Sexual activity: Not Currently     Partners: Male     Other Topics Concern    Not on file     Social History Narrative    No narrative on file         CBC: No results for input(s): WBC, RBC, HGB, HCT, PLT, MCV, MCH, MCHC in the last 72 hours.    CMP: No results for input(s): NA, K, CL, CO2, BUN, CREATININE, GLU, MG, PHOS, CALCIUM, ALBUMIN, PROT, ALKPHOS, ALT, AST, BILITOT in the last 72 hours.    INR  Recent Labs       05/14/18   0811   INR  1.6*             2D Echo:  Results for orders placed or performed during the hospital encounter of 06/17/14   2D echo with color flow doppler   Result Value Ref Range    EF 60     Mitral Valve Regurgitation mild     Diastolic Dysfunction Yes        Anesthesia Evaluation    I have reviewed the Patient Summary Reports.    I have reviewed the Nursing Notes.   I have reviewed the Medications.     Review of Systems  Anesthesia Hx:  No problems with previous Anesthesia    Hematology/Oncology:  Hematology Normal   Oncology Normal     EENT/Dental:EENT/Dental Normal   Cardiovascular:   Hypertension Dysrhythmias atrial fibrillation    Pulmonary:  Pulmonary Normal    Renal/:  Renal/ Normal     Hepatic/GI:  Hepatic/GI Normal    Musculoskeletal:  Musculoskeletal Normal    Neurological:  Neurology Normal    Endocrine:   Hypothyroidism    Dermatological:  Skin Normal    Psych:  Psychiatric Normal           Physical Exam  General:  Well nourished    Airway/Jaw/Neck:  Airway Findings: Mouth Opening: Normal Tongue: Normal  General Airway Assessment: Adult  Mallampati: II  Jaw/Neck Findings:  Neck ROM: Normal ROM     Eyes/Ears/Nose:  Eyes/Ears/Nose Findings:    Dental:  Dental Findings: In tact   Chest/Lungs:  Chest/Lungs Findings: Clear to auscultation, Normal Respiratory Rate     Heart/Vascular:  Heart Findings: Rate: Normal  Rhythm: Regular Rhythm  Sounds: Normal  Heart Murmur  Vascular Findings:        Mental Status:  Mental Status Findings:  Cooperative, Alert and Oriented         Anesthesia Plan  Type of Anesthesia, risks & benefits discussed:  Anesthesia Type:  general  Patient's Preference: General  Intra-op Monitoring Plan: standard ASA monitors and arterial line  Intra-op Monitoring Plan Comments:   Post Op Pain Control Plan:   Post Op Pain Control Plan Comments:   Induction:   IV  Beta Blocker:  Patient is on a Beta-Blocker and has received one dose within the past 24 hours (No further  documentation required).       Informed Consent: Patient understands risks and agrees with Anesthesia plan.  Questions answered. Anesthesia consent signed with patient.  ASA Score: 3     Day of Surgery Review of History & Physical:    H&P update referred to the surgeon.     Anesthesia Plan Notes: Discussed plan for general endotracheal anesthesia, pt understands and agrees with plan        Ready For Surgery From Anesthesia Perspective.

## 2018-05-14 NOTE — H&P
[facility]  Cardiology  History and Physical     Patient Name: Karena Young  MRN: [id number]  Admission Date: 2018  Code Status: Prior   Attending Provider: Jake Hauser MD   Primary Care Physician: Lee Dwyer MD  Principal Problem:Persistent atrial fibrillation    Patient information was obtained from patient and past medical records.     Subjective:     Chief Complaint:  Presents for PVI- Cryo (vs. RFA); MARIAELENA prior     HPI:  Karena Young 78 y.o. woman with PMHx of HOCM s/p ETOH ablation , ventricular tachycardia, ICD (GHADA), HTN, orthostatic hypotension, and HLP.Also noted to have transient A Flutter and AF in  and . Has had no AF since amiodarone -- She was planned for AFL ablation on 3/3/18 that was cancelled after patient was found to in Afib. She was started on coumadin. INR managed per coumadin clinic.   Pt. Has been holding coumadin x ~ 1 week d/t supratherapeutic INRs; INR this mornin.6;  on : 6.3      Past Medical History:   Diagnosis Date    *Atrial fibrillation     *Atrial flutter     Anticoagulant long-term use     Breast cancer     Cardiomyopathy     HOCM (hypertrophic obstructive cardiomyopathy) 2013    Hyperlipidemia     Hypertension     Macular degeneration     Orthostatic hypotension     Ventricular tachycardia        Past Surgical History:   Procedure Laterality Date    BREAST BIOPSY      CARDIAC ELECTROPHYSIOLOGY STUDY AND ABLATION      CATARACT EXTRACTION      ICD implantation      INSERT / REPLACE / REMOVE PACEMAKER         Review of patient's allergies indicates:  No Known Allergies    No current facility-administered medications on file prior to encounter.      Current Outpatient Prescriptions on File Prior to Encounter   Medication Sig    amiodarone (PACERONE) 200 MG Tab Take 1 tablet (200 mg total) by mouth once daily.    apixaban 5 mg Tab Take 1 tablet (5 mg total) by mouth 2 (two) times daily.  Take next dose of medication tonight (2/26/18) between 11pm and 12MN    aspirin 81 mg Tab Take 1 tablet by mouth every evening.     B2/VITS A,C,E/LUT/ZEAXANTH/MIN (ICAPS ORAL) Take by mouth 2 (two) times daily.    calcium citrate-vitamin D3 315-200 mg (CITRACAL+D) 315-200 mg-unit per tablet Take 1 tablet by mouth 2 (two) times daily.      cholecalciferol, vitamin D3, 1,000 unit capsule Take 1 capsule (1,000 Units total) by mouth once daily.    donepezil (ARICEPT) 10 MG tablet Take 1 tablet (10 mg total) by mouth every evening.    letrozole (FEMARA) 2.5 mg Tab TAKE 1 TABLET(2.5 MG) BY MOUTH EVERY DAY    lisinopril 10 MG tablet TAKE 1 TABLET BY MOUTH EVERY DAY    metoprolol tartrate (LOPRESSOR) 100 MG tablet Take 1 tablet (100 mg total) by mouth 2 (two) times daily.    diclofenac sodium (SOLARAZE) 3 % gel Apply to right hip as needed for pain daily    walker (ULTRA-LIGHT ROLLATOR) Misc 1 each by Misc.(Non-Drug; Combo Route) route daily as needed.     Family History     Problem Relation (Age of Onset)    Cancer Mother, Brother (60)    Heart disease Mother, Father        Social History Main Topics    Smoking status: Never Smoker    Smokeless tobacco: Never Used    Alcohol use No    Drug use: No    Sexual activity: Not Currently     Partners: Male     Review of Systems   Constitution: Negative for chills, decreased appetite, fever, weakness, weight gain and weight loss.   Eyes: Negative for blurred vision.   Cardiovascular: Negative for chest pain, claudication, leg swelling and syncope.   Respiratory: Negative for cough and shortness of breath.    Hematologic/Lymphatic: Negative for bleeding problem. Does not bruise/bleed easily.   Skin: Negative for rash.   Musculoskeletal: Negative for joint pain, muscle cramps and muscle weakness.   Gastrointestinal: Negative for abdominal pain, change in bowel habit, diarrhea, nausea and vomiting.   Neurological: Negative for headaches, numbness and paresthesias.    Psychiatric/Behavioral: Negative for altered mental status.     Objective:     Vital Signs (Most Recent):  Temp: 97.6 °F (36.4 °C) (05/14/18 0845)  Pulse: 98 (05/14/18 0845)  Resp: 20 (05/14/18 0845)  BP: 138/76 (05/14/18 0858)  SpO2: 98 % (05/14/18 0845) Vital Signs (24h Range):  Temp:  [97.6 °F (36.4 °C)] 97.6 °F (36.4 °C)  Pulse:  [98] 98  Resp:  [20] 20  SpO2:  [98 %] 98 %  BP: (133-138)/(76-91) 138/76     Weight: 52.6 kg (116 lb)  Body mass index is 19.91 kg/m².    SpO2: 98 %  O2 Device (Oxygen Therapy): room air    No intake or output data in the 24 hours ending 05/14/18 0937    Lines/Drains/Airways     Peripheral Intravenous Line                 Peripheral IV - Single Lumen 04/06/18 1343 Left Antecubital 37 days         Peripheral IV - Single Lumen 05/14/18 0859 Left Forearm less than 1 day         Peripheral IV - Single Lumen 05/14/18 0900 Right Antecubital less than 1 day                Physical Exam   Constitutional: She is oriented to person, place, and time. She appears well-developed and well-nourished.   Neck: Neck supple.   Cardiovascular: Intact distal pulses.  An irregularly irregular rhythm present.   Pulses:       Radial pulses are 2+ on the right side, and 2+ on the left side.   Pulmonary/Chest: Effort normal. No stridor. No respiratory distress. She has no wheezes.   Abdominal: Soft. She exhibits no distension. There is no tenderness.   Musculoskeletal: Normal range of motion. She exhibits edema (BLEs mild edema).   Neurological: She is alert and oriented to person, place, and time. She exhibits normal muscle tone. Coordination normal.   Skin: Skin is warm and dry. No rash noted. No erythema.   Psychiatric: She has a normal mood and affect.   Vitals reviewed.      Significant Labs: 5/8/18: wnl  Except GFR: 39.4; BUN/Crt wnl: 22/1.3   INR   Recent Labs  Lab 05/14/18  0811   INR 1.6*     INR on 5/11/18: 6.3              5/8/18:  7.1               5/2/18: 4.6      Significant Imaging: EKG: AFib;  95 bpm    Assessment and Plan:     * Persistent atrial fibrillation    Plan for PVI-Cryo (vs. RFA) today 18  MARIAELENA prior to r/o thrombus  Anesthesia for sedation  INR this mornin.6; prior INRs x ~2 weeks have been supratherapeutic            VTE Risk Mitigation     None        Prior to procedure, extensive discussion with patient regarding risks and benefits of  PVI, and patient  would like to proceed.  Risks of procedure include but are not limited to the risk of infection, bleeding, stroke, paralysis,pulmonary vein stenosis, atrioesophageal fistula  MI, death, embolism, perforation requiring emergency draining or surgery, AV block, possibility for need for  pacemaker implantation.  The patient voices understanding and all questions have been answered. No further questions/concerns voiced at this time.      Ana Morejon NP  Cardiology   [facility]

## 2018-05-14 NOTE — SUBJECTIVE & OBJECTIVE
Past Medical History:   Diagnosis Date    *Atrial fibrillation     *Atrial flutter     Anticoagulant long-term use     Breast cancer     Cardiomyopathy     HOCM (hypertrophic obstructive cardiomyopathy) 9/6/2013    Hyperlipidemia     Hypertension     Macular degeneration     Orthostatic hypotension     Ventricular tachycardia        Past Surgical History:   Procedure Laterality Date    BREAST BIOPSY      CARDIAC ELECTROPHYSIOLOGY STUDY AND ABLATION  4/06    CATARACT EXTRACTION      ICD implantation      INSERT / REPLACE / REMOVE PACEMAKER         Review of patient's allergies indicates:  No Known Allergies    No current facility-administered medications on file prior to encounter.      Current Outpatient Prescriptions on File Prior to Encounter   Medication Sig    amiodarone (PACERONE) 200 MG Tab Take 1 tablet (200 mg total) by mouth once daily.    apixaban 5 mg Tab Take 1 tablet (5 mg total) by mouth 2 (two) times daily. Take next dose of medication tonight (2/26/18) between 11pm and 12MN    aspirin 81 mg Tab Take 1 tablet by mouth every evening.     B2/VITS A,C,E/LUT/ZEAXANTH/MIN (ICAPS ORAL) Take by mouth 2 (two) times daily.    calcium citrate-vitamin D3 315-200 mg (CITRACAL+D) 315-200 mg-unit per tablet Take 1 tablet by mouth 2 (two) times daily.      cholecalciferol, vitamin D3, 1,000 unit capsule Take 1 capsule (1,000 Units total) by mouth once daily.    donepezil (ARICEPT) 10 MG tablet Take 1 tablet (10 mg total) by mouth every evening.    letrozole (FEMARA) 2.5 mg Tab TAKE 1 TABLET(2.5 MG) BY MOUTH EVERY DAY    lisinopril 10 MG tablet TAKE 1 TABLET BY MOUTH EVERY DAY    metoprolol tartrate (LOPRESSOR) 100 MG tablet Take 1 tablet (100 mg total) by mouth 2 (two) times daily.    diclofenac sodium (SOLARAZE) 3 % gel Apply to right hip as needed for pain daily    walker (ULTRA-LIGHT ROLLATOR) Misc 1 each by Misc.(Non-Drug; Combo Route) route daily as needed.     Family History      Problem Relation (Age of Onset)    Cancer Mother, Brother (60)    Heart disease Mother, Father        Social History Main Topics    Smoking status: Never Smoker    Smokeless tobacco: Never Used    Alcohol use No    Drug use: No    Sexual activity: Not Currently     Partners: Male     Review of Systems   Constitution: Negative for chills, decreased appetite, fever, weakness, weight gain and weight loss.   Eyes: Negative for blurred vision.   Cardiovascular: Negative for chest pain, claudication, leg swelling and syncope.   Respiratory: Negative for cough and shortness of breath.    Hematologic/Lymphatic: Negative for bleeding problem. Does not bruise/bleed easily.   Skin: Negative for rash.   Musculoskeletal: Negative for joint pain, muscle cramps and muscle weakness.   Gastrointestinal: Negative for abdominal pain, change in bowel habit, diarrhea, nausea and vomiting.   Neurological: Negative for headaches, numbness and paresthesias.   Psychiatric/Behavioral: Negative for altered mental status.     Objective:     Vital Signs (Most Recent):  Temp: 97.6 °F (36.4 °C) (05/14/18 0845)  Pulse: 98 (05/14/18 0845)  Resp: 20 (05/14/18 0845)  BP: 138/76 (05/14/18 0858)  SpO2: 98 % (05/14/18 0845) Vital Signs (24h Range):  Temp:  [97.6 °F (36.4 °C)] 97.6 °F (36.4 °C)  Pulse:  [98] 98  Resp:  [20] 20  SpO2:  [98 %] 98 %  BP: (133-138)/(76-91) 138/76     Weight: 52.6 kg (116 lb)  Body mass index is 19.91 kg/m².    SpO2: 98 %  O2 Device (Oxygen Therapy): room air    No intake or output data in the 24 hours ending 05/14/18 0937    Lines/Drains/Airways     Peripheral Intravenous Line                 Peripheral IV - Single Lumen 04/06/18 1343 Left Antecubital 37 days         Peripheral IV - Single Lumen 05/14/18 0859 Left Forearm less than 1 day         Peripheral IV - Single Lumen 05/14/18 0900 Right Antecubital less than 1 day                Physical Exam   Constitutional: She is oriented to person, place, and time. She  appears well-developed and well-nourished.   Neck: Neck supple.   Cardiovascular: Intact distal pulses.  An irregularly irregular rhythm present.   Pulses:       Radial pulses are 2+ on the right side, and 2+ on the left side.   Pulmonary/Chest: Effort normal. No stridor. No respiratory distress. She has no wheezes.   Abdominal: Soft. She exhibits no distension. There is no tenderness.   Musculoskeletal: Normal range of motion. She exhibits edema (BLEs mild edema).   Neurological: She is alert and oriented to person, place, and time. She exhibits normal muscle tone. Coordination normal.   Skin: Skin is warm and dry. No rash noted. No erythema.   Psychiatric: She has a normal mood and affect.   Vitals reviewed.      Significant Labs: 5/8/18: wnl  Except GFR: 39.4; BUN/Crt wnl: 22/1.3   INR   Recent Labs  Lab 05/14/18  0811   INR 1.6*     INR on 5/11/18: 6.3              5/8/18:  7.1               5/2/18: 4.6      Significant Imaging: EKG: AFib; 95 bpm

## 2018-05-14 NOTE — ANESTHESIA PROCEDURE NOTES
Arterial    Diagnosis: atrial fibrillation    Patient location during procedure: done in OR  Procedure start time: 5/14/2018 2:35 PM  Timeout: 5/14/2018 2:35 PM  Procedure end time: 5/14/2018 2:55 PM  Staffing  Anesthesiologist: AARON RAMOS  Performed: anesthesiologist   Anesthesiologist was present at the time of the procedure.  Preanesthetic Checklist  Completed: patient identified, site marked, surgical consent, pre-op evaluation, timeout performed, IV checked, risks and benefits discussed, monitors and equipment checked and anesthesia consent givenArterial  Skin Prep: chlorhexidine gluconate  Orientation: right  Location: radial  Catheter Size: 22 G  Catheter placement by Ultrasound guidance. Heme positive aspiration all ports.  Vessel Caliber: small, patent, compressibility normal  Vascular Doppler:  not done  Needle advanced into vessel with real time Ultrasound guidance.  Guidewire confirmed in vessel.  Sterile sheath used.  Image recorded and saved.Insertion Attempts: 3  Assessment  Dressing: secured with tape and tegaderm  Patient: Tolerated well

## 2018-05-14 NOTE — H&P
TRANSESOPHAGEAL ECHOCARDIOGRAPHY   PRE-PROCEDURE NOTE    05/14/2018    HPI:     Karena Young is a 78 y.o. woman with PMHx of HOCM s/p ETOH ablation 4/06, ventricular tachycardia, ICD, HTN, orthostatic hypotension, and HLP.Also noted to have transient A Flutter and AF in 2009 and 2010. Has had no AF since amiodarone -- She was planned for ALB ablation on 3/3/18 that was cancelled after patient was found to in Afib. She was started on coumadin      Dysphagia or odynophagia:  No  Liver Disease, esophageal disease, or known varices:  No  Upper GI Bleeding: No  Snoring:  No  Sleep Apnea:  No  Prior neck surgery or radiation:  No  History of anesthetic difficulties:  No  Family history of anesthetic difficulties:  No  Last oral intake:  12 hours ago  Able to move neck in all directions:  yes      Meds:     Scheduled Meds:  PRN Meds:ceFAZolin (ANCEF) IVPB  Continuous Infusions:   sodium chloride 0.9%         Physical Exam:     Vitals:          Constitutional: NAD, conversant  HEENT:   Sclera anicteric, Uvula midline, EOMI, OP clear  Neck:               No JVD, moves to all direction without any limitations  CV:               RRR, no murmurs / rubs / gallops, normal S1/S2  Pulm:               CTAB, no wheezes, rales, or ronchi  GI:               Abdomen soft, NTND, +BS  Extremities:              No LE edema, warm with palpable pulses  Neuro:   AAOX3, no focal motor deficits    Mallampati:3  ASA:3      Labs:     Recent Results (from the past 336 hour(s))   CBC auto differential    Collection Time: 05/08/18  9:16 AM   Result Value Ref Range    WBC 5.23 3.90 - 12.70 K/uL    Hemoglobin 14.0 12.0 - 16.0 g/dL    Hematocrit 43.7 37.0 - 48.5 %    Platelets 204 150 - 350 K/uL       Recent Results (from the past 336 hour(s))   Basic metabolic panel    Collection Time: 05/08/18  9:16 AM   Result Value Ref Range    Sodium 139 136 - 145 mmol/L    Potassium 5.0 3.5 - 5.1 mmol/L    Chloride 101 95 - 110 mmol/L    CO2 31 (H) 23 - 29  mmol/L    BUN, Bld 22 8 - 23 mg/dL    Creatinine 1.3 0.5 - 1.4 mg/dL    Calcium 9.8 8.7 - 10.5 mg/dL    Anion Gap 7 (L) 8 - 16 mmol/L       CrCl cannot be calculated (Unknown ideal weight.).    INR: N/A    Imaging:      [id number]  Afib          Assessment & Plan:     PLAN:  1. MARIAELENA for evaluation of ARYAN evaluation     -The risks, benefits & alternatives of the procedure were explained to the patient.    -The risks of transesophageal echo include but are not limited to:  Dental trauma, esophageal trauma/perforation, bleeding, laryngospasm/brochospasm, aspiration, sore throat/hoarseness, & dislodgement of the endotracheal tube/nasogastric tube (where applicable).    -The risks of moderate sedation include hypotension, respiratory depression, arrhythmias, bronchospasm, & death.    -Informed consent was obtained & the patient is agreeable to proceed with the procedure.    I will discuss with the attending physician. Attending addendum is to follow.    Audra Cordoba MD  Cardiology Fellow

## 2018-05-14 NOTE — ASSESSMENT & PLAN NOTE
Plan for PVI-Cryo (vs. RFA) today 18  MARIAELENA prior to r/o thrombus  Anesthesia for sedation  INR this mornin.6; prior INRs x ~2 weeks have been supratherapeutic

## 2018-05-14 NOTE — TRANSFER OF CARE
"Anesthesia Transfer of Care Note    Patient: Karena Young    Procedure(s) Performed: Procedure(s) (LRB):  ABLATION (N/A)  TRANSESOPHAGEAL ECHOCARDIOGRAM (MARIAELENA) (N/A)    Patient location: PACU    Anesthesia Type: general    Transport from OR: Transported from OR on 6-10 L/min O2 by face mask with adequate spontaneous ventilation    Post pain: adequate analgesia    Post assessment: tolerated procedure well and no apparent anesthetic complications    Post vital signs: stable    Level of consciousness: awake and alert    Nausea/Vomiting: no nausea/vomiting    Complications: none    Transfer of care protocol was followed      Last vitals:   Visit Vitals  /76 (BP Location: Right arm, Patient Position: Lying)   Pulse 98   Temp 36.4 °C (97.6 °F) (Oral)   Resp 20   Ht 5' 4" (1.626 m)   Wt 52.6 kg (116 lb)   SpO2 98%   Breastfeeding? No   BMI 19.91 kg/m²     "

## 2018-05-14 NOTE — HPI
Karena PARVIZ Young 78 y.o. woman with PMHx of HOCM s/p ETOH ablation , ventricular tachycardia, ICD (GHADA), HTN, orthostatic hypotension, and HLP.Also noted to have transient A Flutter and AF in  and . Has had no AF since amiodarone -- She was planned for AFL ablation on 3/3/18 that was cancelled after patient was found to in Afib. She was started on coumadin. INR managed per coumadin clinic.   Pt. Has been holding coumadin x ~ 1 week d/t supratherapeutic INRs; INR this mornin.6;  on : 6.3

## 2018-05-15 VITALS
HEIGHT: 64 IN | DIASTOLIC BLOOD PRESSURE: 66 MMHG | WEIGHT: 116 LBS | TEMPERATURE: 97 F | SYSTOLIC BLOOD PRESSURE: 134 MMHG | OXYGEN SATURATION: 96 % | RESPIRATION RATE: 16 BRPM | BODY MASS INDEX: 19.81 KG/M2 | HEART RATE: 64 BPM

## 2018-05-15 LAB
INR PPP: 1.9
POC ACTIVATED CLOTTING TIME K: 158 SEC (ref 74–137)
POC ACTIVATED CLOTTING TIME K: 318 SEC (ref 74–137)
POC ACTIVATED CLOTTING TIME K: 323 SEC (ref 74–137)
POC ACTIVATED CLOTTING TIME K: 379 SEC (ref 74–137)
PROTHROMBIN TIME: 18.7 SEC
SAMPLE: ABNORMAL

## 2018-05-15 PROCEDURE — 99217 PR OBSERVATION CARE DISCHARGE: CPT | Mod: ,,, | Performed by: INTERNAL MEDICINE

## 2018-05-15 PROCEDURE — 36415 COLL VENOUS BLD VENIPUNCTURE: CPT

## 2018-05-15 PROCEDURE — 25000003 PHARM REV CODE 250: Performed by: NURSE PRACTITIONER

## 2018-05-15 PROCEDURE — 85610 PROTHROMBIN TIME: CPT

## 2018-05-15 RX ORDER — WARFARIN 2.5 MG/1
2.5 TABLET ORAL ONCE
Status: COMPLETED | OUTPATIENT
Start: 2018-05-15 | End: 2018-05-15

## 2018-05-15 RX ORDER — FENTANYL CITRATE 50 UG/ML
25 INJECTION, SOLUTION INTRAMUSCULAR; INTRAVENOUS EVERY 5 MIN PRN
Status: DISCONTINUED | OUTPATIENT
Start: 2018-05-15 | End: 2018-05-15 | Stop reason: HOSPADM

## 2018-05-15 RX ORDER — PANTOPRAZOLE SODIUM 40 MG/1
40 TABLET, DELAYED RELEASE ORAL DAILY
Qty: 30 TABLET | Refills: 0 | Status: SHIPPED | OUTPATIENT
Start: 2018-05-16 | End: 2018-07-17 | Stop reason: ALTCHOICE

## 2018-05-15 RX ORDER — SODIUM CHLORIDE 9 MG/ML
INJECTION, SOLUTION INTRAVENOUS CONTINUOUS
Status: DISCONTINUED | OUTPATIENT
Start: 2018-05-15 | End: 2018-05-15 | Stop reason: HOSPADM

## 2018-05-15 RX ORDER — WARFARIN SODIUM 5 MG/1
2.5 TABLET ORAL DAILY
Qty: 30 TABLET | Refills: 12
Start: 2018-05-15 | End: 2018-05-23 | Stop reason: DRUGHIGH

## 2018-05-15 RX ORDER — SODIUM CHLORIDE 0.9 % (FLUSH) 0.9 %
3 SYRINGE (ML) INJECTION
Status: DISCONTINUED | OUTPATIENT
Start: 2018-05-15 | End: 2018-05-15 | Stop reason: HOSPADM

## 2018-05-15 RX ADMIN — AMIODARONE HYDROCHLORIDE 200 MG: 200 TABLET ORAL at 09:05

## 2018-05-15 RX ADMIN — PANTOPRAZOLE SODIUM 40 MG: 40 TABLET, DELAYED RELEASE ORAL at 09:05

## 2018-05-15 RX ADMIN — METOPROLOL TARTRATE 100 MG: 50 TABLET ORAL at 09:05

## 2018-05-15 RX ADMIN — ACETAMINOPHEN 650 MG: 325 TABLET ORAL at 07:05

## 2018-05-15 RX ADMIN — WARFARIN SODIUM 2.5 MG: 2.5 TABLET ORAL at 09:05

## 2018-05-15 RX ADMIN — LISINOPRIL 10 MG: 10 TABLET ORAL at 09:05

## 2018-05-15 NOTE — ANESTHESIA POSTPROCEDURE EVALUATION
"Anesthesia Post Evaluation    Patient: Karena Young    Procedure(s) Performed: Procedure(s) (LRB):  ABLATION (N/A)  TRANSESOPHAGEAL ECHOCARDIOGRAM (MARIAELENA) (N/A)    Final Anesthesia Type: general  Patient location during evaluation: PACU  Patient participation: Yes- Able to Participate  Level of consciousness: awake and alert  Post-procedure vital signs: reviewed and stable  Pain management: adequate  Airway patency: patent  PONV status at discharge: No PONV  Anesthetic complications: no      Cardiovascular status: blood pressure returned to baseline  Respiratory status: unassisted  Hydration status: euvolemic  Follow-up not needed.        Visit Vitals  /66 (BP Location: Right arm, Patient Position: Lying)   Pulse 64   Temp 36.2 °C (97.1 °F) (Oral)   Resp 16   Ht 5' 4" (1.626 m)   Wt 52.6 kg (116 lb)   SpO2 96%   Breastfeeding? No   BMI 19.91 kg/m²       Pain/Wendy Score: Pain Assessment Performed: Yes (5/15/2018  7:01 AM)  Presence of Pain: complains of pain/discomfort (5/15/2018  7:01 AM)  Pain Rating Prior to Med Admin: 10 (5/15/2018  7:01 AM)  Wendy Score: 10 (5/14/2018  8:47 PM)      "

## 2018-05-15 NOTE — DISCHARGE SUMMARY
[facility]  Cardiac Electrophysiology  Discharge Summary      Patient Name: Karena Young  MRN: [id number]  Admission Date: 2018  Hospital Length of Stay: 0 days  Discharge Date and Time: 5/15/2018 10:29 AM  Attending Physician: MARC Hauser MD   Discharging Provider: Ana Morejon NP  Primary Care Physician: Lee Dwyer MD    HPI: Karena Young 78 y.o. woman with PMHx of HOCM s/p ETOH ablation , ventricular tachycardia, ICD (GHADA), HTN, orthostatic hypotension, and HLP.Also noted to have transient A Flutter and AF in  and . Has had no AF since amiodarone -- She was planned for AFL ablation on 3/3/18 that was cancelled after patient was found to in Afib. She was started on coumadin. INR managed per coumadin clinic.   Pt. Has been holding coumadin x ~ 1 week d/t supratherapeutic INRs; INR this mornin.6;  on : 6.3    Procedure(s) (LRB):  ABLATION (N/A)  TRANSESOPHAGEAL ECHOCARDIOGRAM (MARIAELENA) (N/A)     Hospital Course:  S/p PVI-Ablation on 18 (see procedure note for details). Tolerated procedure with no acute complications. No issues overnight. Telemetry reviewed. Tolerating diet, ambulating, voiding, pain well controlled. Bilateral groins soft with no bleeding or hematoma. Discharge plans/instructions discussed with patient and daughter who verbalized understanding and agreement with plans of care.  No further questions or concerns voiced at this time.    Consults: Anesthesia     Significant Diagnostic Studies: Labs:   INR   Lab Results   Component Value Date    INR 1.9 (H) 05/15/2018    INR 1.6 (H) 2018    INR 6.3 (A) 2018       Pending Diagnostic Studies:     None          Final Active Diagnoses:    Diagnosis Date Noted POA    PRINCIPAL PROBLEM:  Persistent atrial fibrillation [I48.1] 2018 Yes    AF (atrial fibrillation) [I48.91] 2018 Yes      Problems Resolved During this Admission:    Diagnosis Date Noted Date Resolved POA      Discharged Condition: good    Disposition: Home or Self Care    Follow Up:  Follow-up Information     Jake Hauser MD In 4 weeks.    Specialties:  Electrophysiology, Cardiology  Why:  4-6 weeks post PVI  Contact information:  Sonu Ledesma  [facility] [id number] [phone]                 Patient Instructions:     Protime-INR   Standing Status: Due 5/17/18  Standing Exp. Date: 07/15/18     Diet Adult Regular     Activity as tolerated     Lifting restrictions   Order Comments: No lifting more than 5-10 pounds x 1 week     Call MD for:  temperature >100.4     Call MD for:  persistent nausea and vomiting or diarrhea     Call MD for:  severe uncontrolled pain     Call MD for:  redness, tenderness, or signs of infection (pain, swelling, redness, odor or green/yellow discharge around incision site)     Call MD for:  difficulty breathing or increased cough     Call MD for:  severe persistent headache     Call MD for:  worsening rash     Call MD for:  persistent dizziness, light-headedness, or visual disturbances     Call MD for:  increased confusion or weakness     Call MD for:   Order Comments: Any concerns regarding procedure     Change dressing (specify)   Order Comments: May remove dressings tonight. No tub baths or soaking in water x 3 days.       Medications:  Reconciled Home Medications:      Medication List      START taking these medications    pantoprazole 40 MG tablet  Commonly known as:  PROTONIX  Take 1 tablet (40 mg total) by mouth once daily.  Start taking on:  5/16/2018        CHANGE how you take these medications    atorvastatin 20 MG tablet  Commonly known as:  LIPITOR  TAKE 1 TABLET BY MOUTH EVERY DAY  What changed:  See the new instructions.     warfarin 5 MG tablet  Commonly known as:  COUMADIN  Take 0.5 tablets (2.5 mg total) by mouth Daily.  What changed:  how much to take        CONTINUE taking these medications    amiodarone 200 MG Tab  Commonly known as:  PACERONE  Take 1 tablet  (200 mg total) by mouth once daily.     aspirin 81 mg Tab  Take 1 tablet by mouth every evening.     calcium citrate-vitamin D3 315-200 mg 315-200 mg-unit per tablet  Commonly known as:  CITRACAL+D  Take 1 tablet by mouth 2 (two) times daily.     cholecalciferol (vitamin D3) 1,000 unit capsule  Take 1 capsule (1,000 Units total) by mouth once daily.     diclofenac sodium 3 % gel  Commonly known as:  SOLARAZE  Apply to right hip as needed for pain daily     donepezil 10 MG tablet  Commonly known as:  ARICEPT  Take 1 tablet (10 mg total) by mouth every evening.     ICAPS ORAL  Take by mouth 2 (two) times daily.     letrozole 2.5 mg Tab  Commonly known as:  FEMARA  TAKE 1 TABLET(2.5 MG) BY MOUTH EVERY DAY     lisinopril 10 MG tablet  TAKE 1 TABLET BY MOUTH EVERY DAY     metoprolol tartrate 100 MG tablet  Commonly known as:  LOPRESSOR  Take 1 tablet (100 mg total) by mouth 2 (two) times daily.     walker Misc  Commonly known as:  ULTRA-LIGHT ROLLATOR  1 each by Misc.(Non-Drug; Combo Route) route daily as needed.        STOP taking these medications    apixaban 5 mg Tab          Ana Morejon NP  Cardiac Electrophysiology  [facility]

## 2018-05-15 NOTE — PLAN OF CARE
Problem: Patient Care Overview  Goal: Plan of Care Review  Outcome: Ongoing (interventions implemented as appropriate)  Plan of care discussed with patient. Patient is free of fall/trauma/injury. Denies CP, SOB. Some c/o arthritis pain to bilateral hips, tylenol given. Bilateral groin sites CDI. All questions addressed. Will continue to monitor. Patient to be discharged this AM.

## 2018-05-15 NOTE — NURSING TRANSFER
Nursing Transfer Note      5/14/2018     Transfer To: 316    Transfer via stretcher    Transfer with cardiac monitoring    Transported by Nurse    Medicines sent: No    Chart send with patient: Yes    Notified: daughter      Upon arrival to floor: cardiac monitor applied, patient oriented to room, call bell in reach and bed in lowest position

## 2018-05-15 NOTE — PROGRESS NOTES
Pt DC'd per MD order. Discharge instructions given including activity,  wound care, S&S of infections, future appointments, and when to call MD. Medications reviewed including drug name, indication, dosage, frequency, and when to take next dose. Telemetry and PIV DC'd, catheter tip intact. Pt left unit via wheelchair with transport and family.

## 2018-05-15 NOTE — NURSING TRANSFER
Nursing Transfer Note      5/14/2018     Transfer To: 316    Transfer via stretcher    Transfer with cardiac monitoring    Transported by nurse    Medicines sent: none    Chart send with patient: Yes    Notified: daughter    Patient reassessed at:2115 5/14/2018    Upon arrival to floor: cardiac monitor applied, patient oriented to room, call bell in reach and bed in lowest position.  Bilateral groin site are CDI, no bleeding or hematoma noted.

## 2018-05-16 ENCOUNTER — NURSE TRIAGE (OUTPATIENT)
Dept: ADMINISTRATIVE | Facility: CLINIC | Age: 78
End: 2018-05-16

## 2018-05-16 NOTE — TELEPHONE ENCOUNTER
Reason for Disposition   Nursing judgment    Protocols used: ST NO PROTOCOL CALL: INFORMATION ONLY-A-[place]    Daughter is calling to report Karena has redness in her face.  Very red cheeks.  Like a bad sunburn.  Temp is 99.  Please contact daughter to advise what is causing this.

## 2018-05-17 ENCOUNTER — ANTI-COAG VISIT (OUTPATIENT)
Dept: CARDIOLOGY | Facility: CLINIC | Age: 78
End: 2018-05-17
Payer: COMMERCIAL

## 2018-05-17 ENCOUNTER — TELEPHONE (OUTPATIENT)
Dept: ELECTROPHYSIOLOGY | Facility: CLINIC | Age: 78
End: 2018-05-17

## 2018-05-17 DIAGNOSIS — Z79.01 LONG TERM (CURRENT) USE OF ANTICOAGULANTS: Primary | ICD-10-CM

## 2018-05-17 DIAGNOSIS — I48.19 PERSISTENT ATRIAL FIBRILLATION: ICD-10-CM

## 2018-05-17 LAB — INR PPP: 6.2 (ref 2–3)

## 2018-05-17 PROCEDURE — 85610 PROTHROMBIN TIME: CPT | Mod: QW,S$GLB,, | Performed by: INTERNAL MEDICINE

## 2018-05-17 RX ORDER — WARFARIN 1 MG/1
1 TABLET ORAL DAILY
Qty: 30 TABLET | Refills: 11 | Status: SHIPPED | OUTPATIENT
Start: 2018-05-17 | End: 2018-08-15 | Stop reason: ALTCHOICE

## 2018-05-17 NOTE — TELEPHONE ENCOUNTER
----- Message from Ivette Ramirez sent at 5/17/2018  3:33 PM CDT -----  Contact: Patient's daughter Alexandra  The Pt's daughter is calling to report that her mother has swelling in her legs after her procedure on Monday. Please call her back @ [phone]. Thanks, Ivette

## 2018-05-17 NOTE — PROGRESS NOTES
Post MARIAELENA procedure.  Patient's INR is supra therapeutic at 6.2.  Reports no signs of bleeding at present.  Instructed patient to hold Warfarin dose.  Eat a large amount of a dark leafy vegetable today.  Recheck tomorrow, 5/18/2018.  Advised to seek medical attention (ED) for any signs of abnormal bleeding.

## 2018-05-17 NOTE — TELEPHONE ENCOUNTER
Left message for pt/family to [place] office tomorrow while Dr. Hauser is there to address pt's problem.

## 2018-05-18 ENCOUNTER — ANTI-COAG VISIT (OUTPATIENT)
Dept: CARDIOLOGY | Facility: CLINIC | Age: 78
End: 2018-05-18
Payer: COMMERCIAL

## 2018-05-18 ENCOUNTER — TELEPHONE (OUTPATIENT)
Dept: ELECTROPHYSIOLOGY | Facility: CLINIC | Age: 78
End: 2018-05-18

## 2018-05-18 DIAGNOSIS — Z79.01 LONG TERM (CURRENT) USE OF ANTICOAGULANTS: Primary | ICD-10-CM

## 2018-05-18 DIAGNOSIS — N18.9 CHRONIC KIDNEY DISEASE, UNSPECIFIED CKD STAGE: ICD-10-CM

## 2018-05-18 LAB — INR PPP: 3.4 (ref 2–3)

## 2018-05-18 PROCEDURE — 85610 PROTHROMBIN TIME: CPT | Mod: QW,S$GLB,, | Performed by: INTERNAL MEDICINE

## 2018-05-18 NOTE — TELEPHONE ENCOUNTER
----- Message from Ainsley Farooq sent at 5/18/2018 10:57 AM CDT -----  Contact: Charisma/shruthi  Patient would like a call back at [phone], Regards to patient health she is having some swelling.    Thanks  Td

## 2018-05-18 NOTE — TELEPHONE ENCOUNTER
Spoke with patient's daughter, Alexandra Hernandez, regarding concerns with swelling. Pt experiencing BLE edema (~2 inches above ankles) that appears to get better overnight / with rest. Daughter noticed swelling on 5/17 -- notes patient did start Protonix rx. Advised daughter to watch her mother's salt intake this weekend and encouraged pt to prop her feet up. Reviewed emergency protocol. Daughter verbalized understanding of all discussed. Will follow up on Monday 5/21.

## 2018-05-18 NOTE — PROGRESS NOTES
Patient's INR is supra therapeutic at 3.4.  Reports no signs of active bleeding at present.  Instructed patient/patient's daughter, Alexandra to have patient hold Warfarin dose today, then start new dose of 1 mg daily.  Recheck at the lab on Wednesday, 5/23/2018.

## 2018-05-21 ENCOUNTER — TELEPHONE (OUTPATIENT)
Dept: ELECTROPHYSIOLOGY | Facility: CLINIC | Age: 78
End: 2018-05-21

## 2018-05-21 NOTE — TELEPHONE ENCOUNTER
Per Dr. Hauser, patient to continue with normal medication routine. Watch for weight gain (3lb overnights, 5lb+ in a week), monitor salt intake. Spoke with daughter, Alexandra, and reviewed these instructions along with what to do in case of emergency. She verbalized understanding of all discussed, will contact the clinic in 1 week if issue is not resolved / worsens.

## 2018-05-22 ENCOUNTER — TELEPHONE (OUTPATIENT)
Dept: ELECTROPHYSIOLOGY | Facility: CLINIC | Age: 78
End: 2018-05-22

## 2018-05-22 NOTE — TELEPHONE ENCOUNTER
----- Message from Ernestina Alejandro RN sent at 5/17/2018  5:17 PM CDT -----  Pt would like her ablation f/u and future appts scheduled in [place].

## 2018-05-23 ENCOUNTER — ANTI-COAG VISIT (OUTPATIENT)
Dept: CARDIOLOGY | Facility: CLINIC | Age: 78
End: 2018-05-23

## 2018-05-23 ENCOUNTER — LAB VISIT (OUTPATIENT)
Dept: LAB | Facility: HOSPITAL | Age: 78
End: 2018-05-23
Attending: INTERNAL MEDICINE
Payer: COMMERCIAL

## 2018-05-23 DIAGNOSIS — Z79.01 LONG TERM (CURRENT) USE OF ANTICOAGULANTS: ICD-10-CM

## 2018-05-23 LAB
INR PPP: 1.5
PROTHROMBIN TIME: 15.4 SEC

## 2018-05-23 PROCEDURE — 85610 PROTHROMBIN TIME: CPT

## 2018-05-23 PROCEDURE — 36415 COLL VENOUS BLD VENIPUNCTURE: CPT | Mod: PO

## 2018-05-25 RX ORDER — DONEPEZIL HYDROCHLORIDE 10 MG/1
TABLET, FILM COATED ORAL
Qty: 30 TABLET | Refills: 11 | Status: SHIPPED | OUTPATIENT
Start: 2018-05-25 | End: 2019-05-28 | Stop reason: SDUPTHER

## 2018-05-28 DIAGNOSIS — I48.0 PAROXYSMAL ATRIAL FIBRILLATION: Primary | ICD-10-CM

## 2018-05-30 ENCOUNTER — LAB VISIT (OUTPATIENT)
Dept: LAB | Facility: HOSPITAL | Age: 78
End: 2018-05-30
Attending: INTERNAL MEDICINE
Payer: COMMERCIAL

## 2018-05-30 ENCOUNTER — ANTI-COAG VISIT (OUTPATIENT)
Dept: CARDIOLOGY | Facility: CLINIC | Age: 78
End: 2018-05-30

## 2018-05-30 DIAGNOSIS — Z79.01 LONG TERM (CURRENT) USE OF ANTICOAGULANTS: ICD-10-CM

## 2018-05-30 DIAGNOSIS — I48.19 PERSISTENT ATRIAL FIBRILLATION: ICD-10-CM

## 2018-05-30 LAB
INR PPP: 1.2
PROTHROMBIN TIME: 12.7 SEC

## 2018-05-30 PROCEDURE — 85610 PROTHROMBIN TIME: CPT

## 2018-05-30 PROCEDURE — 36415 COLL VENOUS BLD VENIPUNCTURE: CPT | Mod: PO

## 2018-05-31 DIAGNOSIS — I48.0 PAROXYSMAL ATRIAL FIBRILLATION: Primary | ICD-10-CM

## 2018-05-31 NOTE — PROGRESS NOTES
Patient's daughter Charisma was given instructions for patient to take coumadin 1 mg on Mondays, Wednesdays and Fridays and 2 mg all other days. She repeated back correctly. Recheck on 6/4/18. Patient will call our office if any questions at [phone] or [phone].

## 2018-06-04 ENCOUNTER — LAB VISIT (OUTPATIENT)
Dept: LAB | Facility: HOSPITAL | Age: 78
End: 2018-06-04
Attending: INTERNAL MEDICINE
Payer: COMMERCIAL

## 2018-06-04 ENCOUNTER — OFFICE VISIT (OUTPATIENT)
Dept: URGENT CARE | Facility: CLINIC | Age: 78
End: 2018-06-04
Payer: COMMERCIAL

## 2018-06-04 ENCOUNTER — ANTI-COAG VISIT (OUTPATIENT)
Dept: CARDIOLOGY | Facility: CLINIC | Age: 78
End: 2018-06-04

## 2018-06-04 VITALS
WEIGHT: 118.81 LBS | HEART RATE: 73 BPM | TEMPERATURE: 98 F | BODY MASS INDEX: 20.4 KG/M2 | OXYGEN SATURATION: 97 % | DIASTOLIC BLOOD PRESSURE: 74 MMHG | SYSTOLIC BLOOD PRESSURE: 136 MMHG

## 2018-06-04 DIAGNOSIS — R30.0 DYSURIA: ICD-10-CM

## 2018-06-04 DIAGNOSIS — Z79.01 LONG TERM (CURRENT) USE OF ANTICOAGULANTS: ICD-10-CM

## 2018-06-04 DIAGNOSIS — N39.0 URINARY TRACT INFECTION WITHOUT HEMATURIA, SITE UNSPECIFIED: Primary | ICD-10-CM

## 2018-06-04 LAB
BILIRUB SERPL-MCNC: NEGATIVE MG/DL
BLOOD URINE, POC: NORMAL
COLOR, POC UA: YELLOW
GLUCOSE UR QL STRIP: NORMAL
INR PPP: 1.1
KETONES UR QL STRIP: NEGATIVE
LEUKOCYTE ESTERASE URINE, POC: NORMAL
NITRITE, POC UA: NEGATIVE
PH, POC UA: 5
PROTEIN, POC: NORMAL
PROTHROMBIN TIME: 12 SEC
SPECIFIC GRAVITY, POC UA: 1.02
UROBILINOGEN, POC UA: NORMAL

## 2018-06-04 PROCEDURE — 87086 URINE CULTURE/COLONY COUNT: CPT

## 2018-06-04 PROCEDURE — 3075F SYST BP GE 130 - 139MM HG: CPT | Mod: CPTII,S$GLB,, | Performed by: NURSE PRACTITIONER

## 2018-06-04 PROCEDURE — 81002 URINALYSIS NONAUTO W/O SCOPE: CPT | Mod: S$GLB,,, | Performed by: NURSE PRACTITIONER

## 2018-06-04 PROCEDURE — 3078F DIAST BP <80 MM HG: CPT | Mod: CPTII,S$GLB,, | Performed by: NURSE PRACTITIONER

## 2018-06-04 PROCEDURE — 85610 PROTHROMBIN TIME: CPT

## 2018-06-04 PROCEDURE — 99999 PR PBB SHADOW E&M-EST. PATIENT-LVL III: CPT | Mod: PBBFAC,,, | Performed by: NURSE PRACTITIONER

## 2018-06-04 PROCEDURE — 36415 COLL VENOUS BLD VENIPUNCTURE: CPT | Mod: PO

## 2018-06-04 PROCEDURE — 99214 OFFICE O/P EST MOD 30 MIN: CPT | Mod: 25,S$GLB,, | Performed by: NURSE PRACTITIONER

## 2018-06-04 RX ORDER — NITROFURANTOIN 25; 75 MG/1; MG/1
100 CAPSULE ORAL 2 TIMES DAILY
Qty: 14 CAPSULE | Refills: 0 | Status: SHIPPED | OUTPATIENT
Start: 2018-06-04 | End: 2018-06-06

## 2018-06-04 NOTE — PATIENT INSTRUCTIONS

## 2018-06-04 NOTE — PROGRESS NOTES
Subjective:       Patient ID: Karena Young is a 78 y.o. female.    Chief Complaint: Urinary Tract Infection    Dysuria    This is a new problem. The current episode started in the past 7 days. The problem occurs every urination. The problem has been unchanged. The quality of the pain is described as burning. The pain is mild. There has been no fever. There is no history of pyelonephritis. Associated symptoms include frequency. Pertinent negatives include no chills, discharge, flank pain, hesitancy, nausea, sweats, urgency, weight loss or constipation. She has tried nothing for the symptoms. Her past medical history is significant for hypertension. There is no history of recurrent UTIs, a single kidney, urinary stasis or a urological procedure.     Review of Systems   Constitutional: Negative for chills, fatigue, fever and weight loss.   HENT: Negative for congestion.    Respiratory: Negative for cough, chest tightness and shortness of breath.    Cardiovascular: Negative for chest pain, palpitations and leg swelling.   Gastrointestinal: Negative for constipation and nausea.   Genitourinary: Positive for dysuria and frequency. Negative for difficulty urinating, flank pain, hesitancy and urgency.   Allergic/Immunologic: Negative for environmental allergies.   Neurological: Negative for dizziness and headaches.   Psychiatric/Behavioral: Negative for agitation.       Objective:      Physical Exam   Constitutional: She is oriented to person, place, and time. She appears well-developed and well-nourished.   Cardiovascular: Normal rate and normal heart sounds.    Pulmonary/Chest: Effort normal and breath sounds normal.   Abdominal: Soft. Normal appearance and bowel sounds are normal. There is no tenderness. There is no rigidity, no rebound, no guarding and no CVA tenderness.   Neurological: She is alert and oriented to person, place, and time.   Nursing note and vitals reviewed.      Assessment:       1. Urinary tract  infection without hematuria, site unspecified    2. Dysuria        Plan:         Karena was seen today for urinary tract infection.    Diagnoses and all orders for this visit:    Urinary tract infection without hematuria, site unspecified  -     nitrofurantoin, macrocrystal-monohydrate, (MACROBID) 100 MG capsule; Take 1 capsule (100 mg total) by mouth 2 (two) times daily.    Dysuria  -     POCT URINE DIPSTICK WITHOUT MICROSCOPE  -     Urine culture    Antibiotic Therapy  Take antibiotics for entire course.  Do not save medications for later, all medications must be taken for full regimen.  Follow up with PCP or ER if symptoms do not improve or worsen.

## 2018-06-04 NOTE — PROGRESS NOTES
"Patient's INR is sub therapeutic today at 1.1.   Spoke with patient's daughter, Alexandra - stated, "she is unclear as to why patient's INR is low and patient will be starting Macrobid today for 7 days".  Instructed to have patient to take 2 mg today (6/05), then resume on regular scheduled dose.  Recheck on Friday, 6/08/2018.  "

## 2018-06-06 ENCOUNTER — OFFICE VISIT (OUTPATIENT)
Dept: URGENT CARE | Facility: CLINIC | Age: 78
End: 2018-06-06
Payer: COMMERCIAL

## 2018-06-06 VITALS
DIASTOLIC BLOOD PRESSURE: 70 MMHG | WEIGHT: 116.38 LBS | TEMPERATURE: 98 F | BODY MASS INDEX: 19.87 KG/M2 | HEART RATE: 73 BPM | SYSTOLIC BLOOD PRESSURE: 132 MMHG | HEIGHT: 64 IN | OXYGEN SATURATION: 98 % | RESPIRATION RATE: 18 BRPM

## 2018-06-06 DIAGNOSIS — R53.83 FATIGUE, UNSPECIFIED TYPE: Primary | ICD-10-CM

## 2018-06-06 DIAGNOSIS — R11.2 NAUSEA AND VOMITING, INTRACTABILITY OF VOMITING NOT SPECIFIED, UNSPECIFIED VOMITING TYPE: ICD-10-CM

## 2018-06-06 DIAGNOSIS — R19.7 DIARRHEA, UNSPECIFIED TYPE: ICD-10-CM

## 2018-06-06 LAB — BACTERIA UR CULT: NORMAL

## 2018-06-06 PROCEDURE — 96372 THER/PROPH/DIAG INJ SC/IM: CPT | Mod: 59,S$GLB,, | Performed by: NURSE PRACTITIONER

## 2018-06-06 PROCEDURE — 3078F DIAST BP <80 MM HG: CPT | Mod: CPTII,S$GLB,, | Performed by: NURSE PRACTITIONER

## 2018-06-06 PROCEDURE — 3075F SYST BP GE 130 - 139MM HG: CPT | Mod: CPTII,S$GLB,, | Performed by: NURSE PRACTITIONER

## 2018-06-06 PROCEDURE — 99214 OFFICE O/P EST MOD 30 MIN: CPT | Mod: 25,S$GLB,, | Performed by: NURSE PRACTITIONER

## 2018-06-06 PROCEDURE — 99999 PR PBB SHADOW E&M-EST. PATIENT-LVL V: CPT | Mod: PBBFAC,,, | Performed by: NURSE PRACTITIONER

## 2018-06-06 PROCEDURE — 96360 HYDRATION IV INFUSION INIT: CPT | Mod: S$GLB,,, | Performed by: NURSE PRACTITIONER

## 2018-06-06 RX ORDER — ONDANSETRON 2 MG/ML
4 INJECTION INTRAMUSCULAR; INTRAVENOUS
Status: COMPLETED | OUTPATIENT
Start: 2018-06-06 | End: 2018-06-06

## 2018-06-06 RX ORDER — SODIUM CHLORIDE 9 MG/ML
INJECTION, SOLUTION INTRAVENOUS
Status: COMPLETED | OUTPATIENT
Start: 2018-06-06 | End: 2018-06-08

## 2018-06-06 RX ORDER — ONDANSETRON 4 MG/1
4 TABLET, FILM COATED ORAL EVERY 8 HOURS PRN
Qty: 20 TABLET | Refills: 0 | Status: SHIPPED | OUTPATIENT
Start: 2018-06-06 | End: 2019-03-22

## 2018-06-06 RX ADMIN — ONDANSETRON 4 MG: 2 INJECTION INTRAMUSCULAR; INTRAVENOUS at 06:06

## 2018-06-06 NOTE — PROGRESS NOTES
Subjective:       Patient ID: Karena Young is a 78 y.o. female.    Chief Complaint: Diarrhea and Emesis    78 year old female presents to Urgent Care with reports of nausea and vomiting.Indicates that symptoms started after she started Macrobid      Nausea   This is a new problem. The current episode started yesterday. The problem occurs intermittently. The problem has been unchanged. Associated symptoms include nausea and vomiting. Pertinent negatives include no abdominal pain, arthralgias, chest pain, chills, congestion, coughing, fatigue, fever, headaches, myalgias, neck pain, numbness, rash or weakness. Nothing aggravates the symptoms. She has tried nothing for the symptoms.     Review of Systems   Constitutional: Negative for activity change, chills, fatigue and fever.   HENT: Negative for congestion, ear pain, postnasal drip, rhinorrhea and sinus pressure.    Eyes: Negative for pain, discharge, redness and itching.   Respiratory: Negative for cough, shortness of breath and wheezing.    Cardiovascular: Negative for chest pain.   Gastrointestinal: Positive for nausea and vomiting. Negative for abdominal pain and diarrhea.   Endocrine: Negative for cold intolerance and polyuria.   Genitourinary: Negative for decreased urine volume and dysuria.   Musculoskeletal: Negative for arthralgias, myalgias, neck pain and neck stiffness.   Skin: Negative for color change, rash and wound.   Allergic/Immunologic: Negative for environmental allergies, food allergies and immunocompromised state.   Neurological: Negative for dizziness, weakness, light-headedness, numbness and headaches.   Psychiatric/Behavioral: Negative for agitation and behavioral problems. The patient is not nervous/anxious.        Objective:     Physical Exam   Constitutional: She is oriented to person, place, and time. She appears well-developed and well-nourished.   HENT:   Head: Normocephalic and atraumatic.   Right Ear: External ear normal.   Left  Ear: External ear normal.   Nose: Nose normal.   Mouth/Throat: Oropharynx is clear and moist.   Eyes: Conjunctivae and EOM are normal. Pupils are equal, round, and reactive to light.   Neck: Normal range of motion. Neck supple.   Cardiovascular: Normal rate, regular rhythm, normal heart sounds and intact distal pulses.    No murmur heard.  Pulmonary/Chest: Effort normal and breath sounds normal. She has no wheezes.   Abdominal: Soft. Bowel sounds are normal. There is no tenderness.   Musculoskeletal: Normal range of motion.   Neurological: She is alert and oriented to person, place, and time. She has normal reflexes.   Skin: Skin is warm and dry. No rash noted.   Psychiatric: She has a normal mood and affect. Her behavior is normal. Judgment and thought content normal.   Nursing note and vitals reviewed.  Instructed to report to ER if symptoms worsen or no improvement. Agrees and understands plan.  Assessment:     1. Fatigue, unspecified type    2. Nausea and vomiting, intractability of vomiting not specified, unspecified vomiting type    3. Diarrhea, unspecified type      Plan:   Karena was seen today for diarrhea and emesis.    Diagnoses and all orders for this visit:    Fatigue, unspecified type    Nausea and vomiting, intractability of vomiting not specified, unspecified vomiting type    Diarrhea, unspecified type    Other orders  -     0.9%  NaCl infusion; Inject into the vein one time.  -     ondansetron injection 4 mg; Inject 4 mg into the vein one time.

## 2018-06-06 NOTE — PATIENT INSTRUCTIONS
Low-Fiber Diet     Eggs are high in protein and easy to digest.     Eating a low-fiber diet means eating foods that dont have much fiber. These foods are easy to digest.  Most of the fiber that you eat passes undigested through your bowel. This is what forms stool. Low-fiber foods can help to slow down your bowel movements. When you eat a low-fiber diet, you have fewer stools. This lets your intestine rest.  Your healthcare provider will tell you how long you need to be on this diet. It may only be for a short time. Low-fiber foods often don't give you all the nutrients you need to keep healthy. Your provider may have you take certain vitamins while you are on this diet.  Reasons to eat a low-fiber diet  The goal of a low-fiber diet is to limit the size and number of your stools. It may be prescribed if you:  · Are having chemotherapy or radiation treatments  · Have had intestinal surgery  · Have a condition that affects your intestine, such as irritable bowel syndrome, Crohns disease, ulcerative colitis, or diverticulitis  General guidelines for a low-fiber diet  In general, a low-fiber diet means having fewer than 13 grams of fiber a day. Your provider may give you a list of things you can and cant eat or drink. Read food labels. Choose foods and drinks that have as close to zero grams of fiber as possible. Here are general guidelines to follow:  Breads, pasta, cereal, rice, and other starches (6 to 11 servings daily)  · What to choose: white bread, biscuits, muffins, and white rolls; plain crackers; waffles; white pasta; white rice; cream of wheat; grits; white pancakes; corn flakes; cooked potatoes without skin.  Fiber content of these foods should be less than 0.5 (1/2) gram per serving.  · What to avoid: whole-wheat or whole-grain breads, crackers, and pasta; breads with seeds or nuts; wheat germ; shoshana crackers; cornbread; wild or brown rice; whole-grain, bran, and granola cereals; cereals with seeds,  nuts, coconut, or dried fruit; potatoes with skin  Milk and dairy (2 servings daily)  · What to choose: milk, buttermilk; yogurt or ice cream without seeds or nuts; custard or pudding; sour cream; cheese and cottage cheese  · What to avoid: ice cream and yogurt with seeds, nuts, or fruit chunks  Fruit (2 to 4 servings daily)  · What to choose: ripe banana; ripe nectarine, peach, apricot, papaya, and plum; soft honeydew melon and cantaloupe; cooked or canned fruit without skin or seeds (not sweetened with sorbitol); applesauce; strained fruit juice (without pulp)  · What to avoid: raw or dried fruit; all berries; raisins; canned and raw pineapple; prunes and prune juice; fruit juice with pulp  Vegetables (3 to 5 servings daily)  · What to choose: well-cooked or canned vegetables without seeds, such as spinach, eggplant, green and wax beans, carrots, yellow squash, pumpkin; lettuce on a sandwich  · What to avoid: all raw or steamed vegetables; vegetables with seeds, such as unstrained tomato sauce; green peas; lima beans; broccoli; corn; parsnips  Meats and protein (4 to 6 ounces daily)  · What to choose: tender, well-cooked meat, including ground meat, poultry, and fish; eggs; tofu; creamy peanut butter  · What to avoid: tough, chewy meat with gristle; peas, including split, yellow, and black-eyed; beans, including navy, lima, black, garbanzo, soy, to, and lentil; peanuts and crunchy peanut butter   Fats, oils, sauces, condiments (fewer than 8 teaspoons daily)  · What to choose: butter, magarine, oils, whipped cream, sour cream, mayonnaise, smooth dressings and sauces; plain gravy; smooth condiments  · What to avoid: dressing with seeds or fruit chunks; pickles and relishes  Other foods and drinks  · What to choose: water; plain gelatin; plain puddings; pretzels; plain cookies and cakes; honey, syrup; decaffeinated drinks, including tea and coffee    · What to avoid: popcorn; potato chips; spicy foods; fried,  greasy foods; alcohol (ask your provider); marmalade, jam, and preserves; desserts that have seeds, nuts, coconut, dried fruit, whole grains or bran; candy that has seeds or nuts; drinks sweetened with sorbitol or other sugar substitutes; caffeinated drinks, including tea, coffee, soda, and energy drinks  Date Last Reviewed: 6/18/2015  © 4492-8357 [employer]. [address] [place] [address], [place] [address]. All rights reserved. This information is not intended as a substitute for professional medical care. Always follow your healthcare professional's instructions.

## 2018-06-08 ENCOUNTER — LAB VISIT (OUTPATIENT)
Dept: LAB | Facility: HOSPITAL | Age: 78
End: 2018-06-08
Attending: INTERNAL MEDICINE
Payer: COMMERCIAL

## 2018-06-08 DIAGNOSIS — Z79.01 LONG TERM (CURRENT) USE OF ANTICOAGULANTS: ICD-10-CM

## 2018-06-08 LAB
INR PPP: 1.6
PROTHROMBIN TIME: 16.1 SEC

## 2018-06-08 PROCEDURE — 36415 COLL VENOUS BLD VENIPUNCTURE: CPT | Mod: PO

## 2018-06-08 PROCEDURE — 85610 PROTHROMBIN TIME: CPT

## 2018-06-08 RX ADMIN — SODIUM CHLORIDE: 9 INJECTION, SOLUTION INTRAVENOUS at 09:06

## 2018-06-11 ENCOUNTER — ANTI-COAG VISIT (OUTPATIENT)
Dept: CARDIOLOGY | Facility: CLINIC | Age: 78
End: 2018-06-11

## 2018-06-11 NOTE — PROGRESS NOTES
Patient's INR is sub therapeutic at 1.6.  Reports no missed doses or diet changes.  Instructed Alexandra (daughter) to have patient start new dose of 1 mg on Wednesdays and Fridays, then 2 mg on all other days.  Recheck on Monday, 6/18/2018.

## 2018-06-20 ENCOUNTER — LAB VISIT (OUTPATIENT)
Dept: LAB | Facility: HOSPITAL | Age: 78
End: 2018-06-20
Attending: INTERNAL MEDICINE
Payer: COMMERCIAL

## 2018-06-20 ENCOUNTER — CLINICAL SUPPORT (OUTPATIENT)
Dept: ELECTROPHYSIOLOGY | Facility: CLINIC | Age: 78
End: 2018-06-20
Attending: INTERNAL MEDICINE
Payer: COMMERCIAL

## 2018-06-20 DIAGNOSIS — Z79.01 LONG TERM (CURRENT) USE OF ANTICOAGULANTS: ICD-10-CM

## 2018-06-20 DIAGNOSIS — Z95.810 PRESENCE OF AUTOMATIC CARDIOVERTER/DEFIBRILLATOR (AICD): ICD-10-CM

## 2018-06-20 LAB
INR PPP: 2.6
PROTHROMBIN TIME: 27.4 SEC

## 2018-06-20 PROCEDURE — 93295 DEV INTERROG REMOTE 1/2/MLT: CPT | Mod: S$GLB,,, | Performed by: INTERNAL MEDICINE

## 2018-06-20 PROCEDURE — 93296 REM INTERROG EVL PM/IDS: CPT | Mod: S$GLB,,, | Performed by: INTERNAL MEDICINE

## 2018-06-20 PROCEDURE — 85610 PROTHROMBIN TIME: CPT

## 2018-06-20 PROCEDURE — 36415 COLL VENOUS BLD VENIPUNCTURE: CPT | Mod: PO

## 2018-06-21 ENCOUNTER — ANTI-COAG VISIT (OUTPATIENT)
Dept: CARDIOLOGY | Facility: CLINIC | Age: 78
End: 2018-06-21

## 2018-06-21 NOTE — PROGRESS NOTES
Patient's INR is therapeutic at 2.6.  No changes in dose.  Recheck in 2 weeks.  Please call should you have any questions or concerns at [phone] or [phone].

## 2018-06-25 DIAGNOSIS — Z79.811 AROMATASE INHIBITOR USE: ICD-10-CM

## 2018-06-25 DIAGNOSIS — C50.411 MALIGNANT NEOPLASM OF UPPER-OUTER QUADRANT OF RIGHT BREAST IN FEMALE, ESTROGEN RECEPTOR POSITIVE: ICD-10-CM

## 2018-06-25 DIAGNOSIS — Z17.0 MALIGNANT NEOPLASM OF UPPER-OUTER QUADRANT OF RIGHT BREAST IN FEMALE, ESTROGEN RECEPTOR POSITIVE: ICD-10-CM

## 2018-06-25 RX ORDER — LETROZOLE 2.5 MG/1
TABLET, FILM COATED ORAL
Qty: 30 TABLET | Refills: 0 | Status: SHIPPED | OUTPATIENT
Start: 2018-06-25 | End: 2018-07-26 | Stop reason: SDUPTHER

## 2018-07-05 ENCOUNTER — LAB VISIT (OUTPATIENT)
Dept: LAB | Facility: HOSPITAL | Age: 78
End: 2018-07-05
Attending: INTERNAL MEDICINE
Payer: COMMERCIAL

## 2018-07-05 DIAGNOSIS — Z79.01 LONG TERM (CURRENT) USE OF ANTICOAGULANTS: ICD-10-CM

## 2018-07-05 LAB
INR PPP: 1.7
PROTHROMBIN TIME: 16.7 SEC

## 2018-07-05 PROCEDURE — 85610 PROTHROMBIN TIME: CPT

## 2018-07-05 PROCEDURE — 36415 COLL VENOUS BLD VENIPUNCTURE: CPT | Mod: PO

## 2018-07-06 ENCOUNTER — ANTI-COAG VISIT (OUTPATIENT)
Dept: CARDIOLOGY | Facility: CLINIC | Age: 78
End: 2018-07-06

## 2018-07-06 NOTE — PROGRESS NOTES
"Patient's INR is sub therapeutic at 1.7.  Daughter, Alexandra reports no missed doses - "maybe too much greens in her diet".  Instructed to take 2 mg today, 6/06/2018, then resume on regular scheduled dose.  Recheck in 2 weeks.  "

## 2018-07-17 ENCOUNTER — OFFICE VISIT (OUTPATIENT)
Dept: HEMATOLOGY/ONCOLOGY | Facility: CLINIC | Age: 78
End: 2018-07-17
Payer: COMMERCIAL

## 2018-07-17 ENCOUNTER — INFUSION (OUTPATIENT)
Dept: INFUSION THERAPY | Facility: HOSPITAL | Age: 78
End: 2018-07-17
Attending: INTERNAL MEDICINE
Payer: COMMERCIAL

## 2018-07-17 ENCOUNTER — LAB VISIT (OUTPATIENT)
Dept: LAB | Facility: HOSPITAL | Age: 78
End: 2018-07-17
Attending: INTERNAL MEDICINE
Payer: COMMERCIAL

## 2018-07-17 ENCOUNTER — ANTI-COAG VISIT (OUTPATIENT)
Dept: CARDIOLOGY | Facility: CLINIC | Age: 78
End: 2018-07-17

## 2018-07-17 VITALS
DIASTOLIC BLOOD PRESSURE: 74 MMHG | HEIGHT: 64 IN | TEMPERATURE: 98 F | RESPIRATION RATE: 18 BRPM | HEART RATE: 77 BPM | WEIGHT: 117.31 LBS | OXYGEN SATURATION: 97 % | SYSTOLIC BLOOD PRESSURE: 132 MMHG | BODY MASS INDEX: 20.03 KG/M2

## 2018-07-17 DIAGNOSIS — Z17.0 MALIGNANT NEOPLASM OF UPPER-OUTER QUADRANT OF RIGHT BREAST IN FEMALE, ESTROGEN RECEPTOR POSITIVE: ICD-10-CM

## 2018-07-17 DIAGNOSIS — C50.411 MALIGNANT NEOPLASM OF UPPER-OUTER QUADRANT OF RIGHT BREAST IN FEMALE, ESTROGEN RECEPTOR POSITIVE: ICD-10-CM

## 2018-07-17 DIAGNOSIS — M85.89 OSTEOPENIA OF MULTIPLE SITES: ICD-10-CM

## 2018-07-17 DIAGNOSIS — Z79.811 AROMATASE INHIBITOR USE: ICD-10-CM

## 2018-07-17 DIAGNOSIS — Z17.0 MALIGNANT NEOPLASM OF UPPER-OUTER QUADRANT OF RIGHT BREAST IN FEMALE, ESTROGEN RECEPTOR POSITIVE: Primary | ICD-10-CM

## 2018-07-17 DIAGNOSIS — I48.20 CHRONIC ATRIAL FIBRILLATION: ICD-10-CM

## 2018-07-17 DIAGNOSIS — E55.9 VITAMIN D DEFICIENCY: ICD-10-CM

## 2018-07-17 DIAGNOSIS — E78.5 HYPERLIPIDEMIA, UNSPECIFIED HYPERLIPIDEMIA TYPE: ICD-10-CM

## 2018-07-17 DIAGNOSIS — C50.411 MALIGNANT NEOPLASM OF UPPER-OUTER QUADRANT OF RIGHT BREAST IN FEMALE, ESTROGEN RECEPTOR POSITIVE: Primary | ICD-10-CM

## 2018-07-17 DIAGNOSIS — Z79.01 LONG TERM (CURRENT) USE OF ANTICOAGULANTS: ICD-10-CM

## 2018-07-17 LAB
25(OH)D3+25(OH)D2 SERPL-MCNC: 26 NG/ML
ALBUMIN SERPL BCP-MCNC: 3.7 G/DL
ALP SERPL-CCNC: 88 U/L
ALT SERPL W/O P-5'-P-CCNC: 28 U/L
ANION GAP SERPL CALC-SCNC: 10 MMOL/L
AST SERPL-CCNC: 32 U/L
BASOPHILS # BLD AUTO: 0.01 K/UL
BASOPHILS NFR BLD: 0.2 %
BILIRUB SERPL-MCNC: 0.6 MG/DL
BUN SERPL-MCNC: 22 MG/DL
CALCIUM SERPL-MCNC: 9.6 MG/DL
CHLORIDE SERPL-SCNC: 102 MMOL/L
CO2 SERPL-SCNC: 30 MMOL/L
CREAT SERPL-MCNC: 1.1 MG/DL
DIFFERENTIAL METHOD: ABNORMAL
EOSINOPHIL # BLD AUTO: 0.1 K/UL
EOSINOPHIL NFR BLD: 1 %
ERYTHROCYTE [DISTWIDTH] IN BLOOD BY AUTOMATED COUNT: 15.2 %
EST. GFR  (AFRICAN AMERICAN): 56 ML/MIN/1.73 M^2
EST. GFR  (NON AFRICAN AMERICAN): 48 ML/MIN/1.73 M^2
GLUCOSE SERPL-MCNC: 114 MG/DL
HCT VFR BLD AUTO: 42.5 %
HGB BLD-MCNC: 13.6 G/DL
INR PPP: 2.4
LYMPHOCYTES # BLD AUTO: 0.9 K/UL
LYMPHOCYTES NFR BLD: 16.6 %
MCH RBC QN AUTO: 30.1 PG
MCHC RBC AUTO-ENTMCNC: 32 G/DL
MCV RBC AUTO: 94 FL
MONOCYTES # BLD AUTO: 0.4 K/UL
MONOCYTES NFR BLD: 8.6 %
NEUTROPHILS # BLD AUTO: 3.8 K/UL
NEUTROPHILS NFR BLD: 73.6 %
PLATELET # BLD AUTO: 205 K/UL
PMV BLD AUTO: 10.4 FL
POTASSIUM SERPL-SCNC: 3.8 MMOL/L
PROT SERPL-MCNC: 6.7 G/DL
PROTHROMBIN TIME: 25.2 SEC
RBC # BLD AUTO: 4.52 M/UL
SODIUM SERPL-SCNC: 142 MMOL/L
TSH SERPL DL<=0.005 MIU/L-ACNC: 2.49 UIU/ML
WBC # BLD AUTO: 5.12 K/UL

## 2018-07-17 PROCEDURE — 85610 PROTHROMBIN TIME: CPT | Mod: PO

## 2018-07-17 PROCEDURE — 3078F DIAST BP <80 MM HG: CPT | Mod: CPTII,S$GLB,, | Performed by: INTERNAL MEDICINE

## 2018-07-17 PROCEDURE — 99999 PR PBB SHADOW E&M-EST. PATIENT-LVL III: CPT | Mod: PBBFAC,,, | Performed by: INTERNAL MEDICINE

## 2018-07-17 PROCEDURE — 99214 OFFICE O/P EST MOD 30 MIN: CPT | Mod: S$GLB,,, | Performed by: INTERNAL MEDICINE

## 2018-07-17 PROCEDURE — 63600175 PHARM REV CODE 636 W HCPCS: Mod: JG,PO | Performed by: INTERNAL MEDICINE

## 2018-07-17 PROCEDURE — 82306 VITAMIN D 25 HYDROXY: CPT

## 2018-07-17 PROCEDURE — 80053 COMPREHEN METABOLIC PANEL: CPT | Mod: PO

## 2018-07-17 PROCEDURE — 3075F SYST BP GE 130 - 139MM HG: CPT | Mod: CPTII,S$GLB,, | Performed by: INTERNAL MEDICINE

## 2018-07-17 PROCEDURE — 36415 COLL VENOUS BLD VENIPUNCTURE: CPT | Mod: PO

## 2018-07-17 PROCEDURE — 85025 COMPLETE CBC W/AUTO DIFF WBC: CPT | Mod: PO

## 2018-07-17 PROCEDURE — 84443 ASSAY THYROID STIM HORMONE: CPT

## 2018-07-17 PROCEDURE — 96372 THER/PROPH/DIAG INJ SC/IM: CPT | Mod: PO

## 2018-07-17 RX ADMIN — DENOSUMAB 60 MG: 60 INJECTION SUBCUTANEOUS at 03:07

## 2018-07-17 NOTE — PROGRESS NOTES
Patient's INR is therapeutic at 2.4.  No changes in dose.  Recheck in 2 weeks.  Please call should you have any questions or concerns at [phone] or [phone].

## 2018-07-17 NOTE — PROGRESS NOTES
Hematology/Oncology Office Note  [facility] Diagnosis: Right sided breast cancer, ER+/NM neg/[id number] neg     Stage: Unknown. pT1b     Pathology: Infiltrating ductal carcinoma, 0.6cm, margins negative. ER+/NM neg/[id number] neg.     Prior Treatment: Right breast lumpectomy on 5/5/15 by Dr. Pacheco     Current Treatment: Adjuvant endocrine therapy, started 6/2015         CC:  Breast cancer    Referred by:  No ref. provider found    History of Present Illness:  Mr. Young is a 76 y/o female with hypertrophic cardiomyopathy, ICD, and right-sided breast cancer. She had an abnormal screening mammogram in March 2015, followed by abnormal diagnostic mammogram and breast ultrasound. She underwent initial biopsy at [facility], which was positive for breast cancer. She then underwent R breast lumpectomy by Dr. Pacheco on 5/5/15. She did not undergo sentinel LN dissection due to comorbidities and feeling that she would likely not be a candidate for chemotherapy. After our intial meeting, we did discuss that due to her decreased performance status and cardiomyopathy, she was not a candidate for chemotherapy. She also met with Radiation Oncology. They are also recommended against adjuvant radiation. She is taking adjuvant Letrozole. She denies any recurrent hospitalizations for health problems aside from cardiac procedures for ICD/pacemaker implantation, ablation surgery. No recurrent infections. No chronic LE edema. No pulmonary edema. She has hypertrophic obstructive cardiomyopathy, causing diastolic dysfunction, but does not require Lasix. She does have significant SOB and HOBBS. She gets quite winded just walked on her driveway to her mailbox. She cannot walk a full block without stopping to rest.        I have reviewed and updated the HPI, ROS, PMHx, Social Hx, Family Hx and treatment history.    Today's visit:  Patient presents for routine follow-up today.  She is scheduled to begin treatment with Prolia for a induced  osteopenia    Past Medical History:   Diagnosis Date    *Atrial fibrillation     *Atrial flutter     Anticoagulant long-term use     Breast cancer     Cardiomyopathy     HOCM (hypertrophic obstructive cardiomyopathy) 9/6/2013    Hyperlipidemia     Hypertension     Macular degeneration     Orthostatic hypotension     Ventricular tachycardia          Social History:  No tobacco, alcohol, or illicit drugs      Family History: family history includes Cancer in her mother; Cancer (age of onset: 60) in her brother; Heart disease in her father and mother.        HPI    Review of Systems   Constitutional: Positive for appetite change. Negative for activity change, fatigue, fever and unexpected weight change.   HENT: Negative for congestion, dental problem, drooling, ear discharge, ear pain, facial swelling, mouth sores and nosebleeds.    Eyes: Negative.    Respiratory: Negative for apnea, cough, choking, chest tightness, shortness of breath and stridor.    Cardiovascular: Negative for chest pain, palpitations and leg swelling.   Gastrointestinal: Negative for abdominal distention, abdominal pain, anal bleeding, blood in stool, diarrhea and nausea.   Endocrine: Negative.    Genitourinary: Negative for difficulty urinating, dyspareunia, dysuria, enuresis, frequency, hematuria, menstrual problem and pelvic pain.   Musculoskeletal: Negative for arthralgias, back pain, gait problem, joint swelling, myalgias and neck pain.   Skin: Negative for color change, pallor and rash.   Allergic/Immunologic: Negative.  Negative for environmental allergies.   Neurological: Negative for seizures, facial asymmetry, speech difficulty, light-headedness, numbness and headaches.   Hematological: Negative for adenopathy. Does not bruise/bleed easily.   Psychiatric/Behavioral: Negative for agitation, behavioral problems, confusion, decreased concentration, dysphoric mood and hallucinations. The patient is not hyperactive.       "  Objective:       Vitals:    07/17/18 1506   BP: 132/74   Pulse: 77   Resp: 18   Temp: 98 °F (36.7 °C)   TempSrc: Oral   SpO2: 97%   Weight: 53.2 kg (117 lb 4.6 oz)   Height: 5' 4" (1.626 m)     Physical Exam   Constitutional: She is oriented to person, place, and time. She appears well-developed and well-nourished. No distress.   Well groomed   HENT:   Head: Normocephalic and atraumatic.   Right Ear: External ear normal.   Left Ear: External ear normal.   Nose: Nose normal. Right sinus exhibits no maxillary sinus tenderness and no frontal sinus tenderness. Left sinus exhibits no maxillary sinus tenderness and no frontal sinus tenderness.   Mouth/Throat: Oropharynx is clear and moist and mucous membranes are normal. Normal dentition.   Eyes: Conjunctivae, EOM and lids are normal. Pupils are equal, round, and reactive to light. Lids are everted and swept, no foreign bodies found. No scleral icterus.   Neck: Trachea normal and normal range of motion. Neck supple. No JVD present. No tracheal deviation present. No thyroid mass and no thyromegaly present.   No crepitus   Cardiovascular: Normal rate, regular rhythm, normal heart sounds, intact distal pulses and normal pulses.  Exam reveals no gallop and no friction rub.    No murmur heard.  Pulmonary/Chest: Effort normal and breath sounds normal. She has no wheezes. She has no rales. She exhibits no tenderness.   Abdominal: Soft. Normal appearance and bowel sounds are normal. She exhibits no distension and no mass. There is no hepatosplenomegaly. There is no tenderness. There is no rebound and no guarding.   Musculoskeletal: Normal range of motion. She exhibits no edema or tenderness.   Lymphadenopathy:     She has no cervical adenopathy.   Neurological: She is alert and oriented to person, place, and time. No cranial nerve deficit. She exhibits normal muscle tone. Coordination normal.   Skin: Skin is warm, dry and intact. Capillary refill takes less than 2 seconds. No " abrasion, no bruising, no ecchymosis and no rash noted. She is not diaphoretic. No cyanosis. No pallor. Nails show no clubbing.   Psychiatric: She has a normal mood and affect. Her behavior is normal. Judgment and thought content normal.       Lab Results   Component Value Date    WBC 5.12 07/17/2018    HGB 13.6 07/17/2018    HCT 42.5 07/17/2018    MCV 94 07/17/2018     07/17/2018     Lab Results   Component Value Date    CREATININE 1.3 05/08/2018    BUN 22 05/08/2018     05/08/2018    K 5.0 05/08/2018     05/08/2018    CO2 31 (H) 05/08/2018     Lab Results   Component Value Date    ALT 39 12/29/2017    AST 29 12/29/2017    ALKPHOS 90 12/29/2017    BILITOT 0.4 12/29/2017         Assessment:       Karena Young is a 77 y.o. female with hypertrophic obstructive cardiomyopathy, CKD comes in for management of breast cancer.  She was previously followed by Dr. Hu in the hematology/oncology clinic and I am seeing the patient for the first time today in Dr. Hu's absence She has a history of Right sided breast cancer: Estrogen positive, MT and Vam2Ejb negative. pT1b tumor s/p lumpectomy. Previously discussed the reasons for surgical staging in helping to determine whether adjuvant chemotherapy is recommended or not. Also discussed the risks and benefits of adjuvant chemotherapy. In this patient with advanced age, comorbidities and most importantly significant dyspnea on exertion, would not recommend adjuvant chemotherapy. Do feel that the risks of chemotherapy including infection outweigh the benefits in her case. Therefore, we discussed that she does not need to undergo further surgical staging as it would not change our management. As her tumor is relatively small and has hormone receptor positive / Oqr4Olo negative pattern, she has a good prognosis with adjuvant endocrine therapy for 5 yrs. She was evaluated by [facility] for adjuvant radiation therapy and she was advised against pursuing  radiation.   Patient continues to tolerate letrozole well, however, she reports that Zometa gives her hallucinations.  Therefore, we will proceed with Prolia today.    I had a lengthy discussion with the patient and her daughter concerning surveillance mammograms and the utility of surveillance mammogram.  The patient and daughter strongly wants to continue surveillance mammograms and we will schedule bilateral mammogram within the next month.  She will follow up in 6 months with repeat labs and additional Prolia      Stage I pT1b, N0, M0 ER/VA positive, HER-2 negative infiltrating ductal carcinoma of the right breast status post lumpectomy (no sentinel lymph node biopsy due to comorbidities)  --letrozole 2.5 mg daily  --follow-up in 6 months with repeat labs and Prolia  -- Mammogram to be scheduled within the next month--patient and daughter strongly 1 to continue mammogram     Osteopenia: Seen on DEXA 3/26/15. She was started on Calcium and Vit D.   --Discontinue Zometa and proceed with Prolia 60 mg every 6 months       Hypertrophic obstructive cardiomyopathy: s/p ETOH ablation 4/2006.   --continue to follow Cardiology     Atrial fibrillation: Had transient Atrial flutter and Afib in 2009 and 2010. Also had VT and was placed on Amiodarone for that with no further Afib. Has ICD/pacemaker.    --Currently on full anticoagulation with Coumadin

## 2018-07-26 DIAGNOSIS — Z79.811 AROMATASE INHIBITOR USE: ICD-10-CM

## 2018-07-26 DIAGNOSIS — C50.411 MALIGNANT NEOPLASM OF UPPER-OUTER QUADRANT OF RIGHT BREAST IN FEMALE, ESTROGEN RECEPTOR POSITIVE: ICD-10-CM

## 2018-07-26 DIAGNOSIS — Z17.0 MALIGNANT NEOPLASM OF UPPER-OUTER QUADRANT OF RIGHT BREAST IN FEMALE, ESTROGEN RECEPTOR POSITIVE: ICD-10-CM

## 2018-07-27 RX ORDER — LETROZOLE 2.5 MG/1
TABLET, FILM COATED ORAL
Qty: 30 TABLET | Refills: 0 | Status: SHIPPED | OUTPATIENT
Start: 2018-07-27 | End: 2018-08-27 | Stop reason: SDUPTHER

## 2018-07-30 ENCOUNTER — OFFICE VISIT (OUTPATIENT)
Dept: OPHTHALMOLOGY | Facility: CLINIC | Age: 78
End: 2018-07-30
Payer: COMMERCIAL

## 2018-07-30 DIAGNOSIS — I10 ESSENTIAL HYPERTENSION: ICD-10-CM

## 2018-07-30 DIAGNOSIS — I95.1 ORTHOSTATIC HYPOTENSION: ICD-10-CM

## 2018-07-30 DIAGNOSIS — H34.8312 STABLE BRANCH RETINAL VEIN OCCLUSION OF RIGHT EYE: ICD-10-CM

## 2018-07-30 DIAGNOSIS — H35.3132 INTERMEDIATE STAGE NONEXUDATIVE AGE-RELATED MACULAR DEGENERATION OF BOTH EYES: Primary | ICD-10-CM

## 2018-07-30 PROCEDURE — 92014 COMPRE OPH EXAM EST PT 1/>: CPT | Mod: S$GLB,,, | Performed by: OPHTHALMOLOGY

## 2018-07-30 PROCEDURE — 99999 PR PBB SHADOW E&M-EST. PATIENT-LVL II: CPT | Mod: PBBFAC,,, | Performed by: OPHTHALMOLOGY

## 2018-07-30 PROCEDURE — 92134 CPTRZ OPH DX IMG PST SGM RTA: CPT | Mod: S$GLB,,, | Performed by: OPHTHALMOLOGY

## 2018-07-30 NOTE — PROGRESS NOTES
===============================  07/30/2018   Karena Young,   78 y.o. female   Last visit [facility]: :7/17/2017   Last visit eye dept. Visit date not found  [facility]:  Corrected distance visual acuity was 20/25 in the right eye and 20/40 in the left eye.  Tonometry     Tonometry (Applanation, 2:43 PM)       Right Left    Pressure 13 14              Wearing Rx     Wearing Rx       Sphere Cylinder Axis Add    Right -1.50 +1.75 097 +2.25    Left -2.75 +3.00 077 +2.25              Manifest Refraction     Manifest Refraction       Sphere Cylinder [place] Dist [facility]    Right -2.00 +1.75 097 20/25    Left -3.25 +3.00 077 20/40              Chief Complaint   Patient presents with    Macular Degeneration     here for yrly smd exam, pt states she sees really good    CRVO    ERM        HPI     Macular Degeneration    Additional comments: here for yrly smd exam, pt states she sees really   good           Comments   'AA' SMD  PCIOL OU  OD OLD RVO  OS ERM       Last edited by JEANETTE Jeffery MD on 7/30/2018  2:46 PM. (History)          ________________  7/30/2018  Problem List Items Addressed This Visit        Eye/Vision problems    Intermediate stage nonexudative age-related macular degeneration of both eyes - Primary       Other    Orthostatic hypotension    HTN (hypertension)      Other Visit Diagnoses     Stable branch retinal vein occlusion of right eye              .sinl;eg micr geoh\   old brvo od look great sl thin   rtc 1year          ===========================

## 2018-07-31 ENCOUNTER — LAB VISIT (OUTPATIENT)
Dept: LAB | Facility: HOSPITAL | Age: 78
End: 2018-07-31
Attending: INTERNAL MEDICINE
Payer: COMMERCIAL

## 2018-07-31 ENCOUNTER — ANTI-COAG VISIT (OUTPATIENT)
Dept: CARDIOLOGY | Facility: CLINIC | Age: 78
End: 2018-07-31

## 2018-07-31 DIAGNOSIS — Z79.01 LONG TERM (CURRENT) USE OF ANTICOAGULANTS: ICD-10-CM

## 2018-07-31 LAB
INR PPP: 1.6
PROTHROMBIN TIME: 16.1 SEC

## 2018-07-31 PROCEDURE — 85610 PROTHROMBIN TIME: CPT

## 2018-07-31 PROCEDURE — 36415 COLL VENOUS BLD VENIPUNCTURE: CPT | Mod: PO

## 2018-07-31 NOTE — PROGRESS NOTES
"Patient's INR is sub therapeutic at 1.6.  Contacted Alexandra (daughter):  Reported patient "possibly missed one dose".  Instructed to increase patient's dose to 3 mg today (7/31) - only, then resume on regular scheduled dose.  Recheck on Tuesday, 7/14/2018.  "

## 2018-08-14 DIAGNOSIS — I48.0 PAF (PAROXYSMAL ATRIAL FIBRILLATION): Primary | ICD-10-CM

## 2018-08-15 ENCOUNTER — ANTI-COAG VISIT (OUTPATIENT)
Dept: CARDIOLOGY | Facility: CLINIC | Age: 78
End: 2018-08-15
Payer: COMMERCIAL

## 2018-08-15 ENCOUNTER — OFFICE VISIT (OUTPATIENT)
Dept: CARDIOLOGY | Facility: CLINIC | Age: 78
End: 2018-08-15
Payer: COMMERCIAL

## 2018-08-15 ENCOUNTER — CLINICAL SUPPORT (OUTPATIENT)
Dept: CARDIOLOGY | Facility: CLINIC | Age: 78
End: 2018-08-15
Payer: COMMERCIAL

## 2018-08-15 VITALS
BODY MASS INDEX: 18.95 KG/M2 | HEART RATE: 72 BPM | WEIGHT: 111 LBS | DIASTOLIC BLOOD PRESSURE: 76 MMHG | HEIGHT: 64 IN | SYSTOLIC BLOOD PRESSURE: 130 MMHG

## 2018-08-15 DIAGNOSIS — Z95.810 IMPLANTABLE CARDIOVERTER-DEFIBRILLATOR (ICD) IN SITU: ICD-10-CM

## 2018-08-15 DIAGNOSIS — Z17.0 MALIGNANT NEOPLASM OF UPPER-OUTER QUADRANT OF RIGHT BREAST IN FEMALE, ESTROGEN RECEPTOR POSITIVE: ICD-10-CM

## 2018-08-15 DIAGNOSIS — E03.8 SUBCLINICAL HYPOTHYROIDISM: Chronic | ICD-10-CM

## 2018-08-15 DIAGNOSIS — I48.0 PAROXYSMAL ATRIAL FIBRILLATION: Primary | ICD-10-CM

## 2018-08-15 DIAGNOSIS — I45.10 RBBB: ICD-10-CM

## 2018-08-15 DIAGNOSIS — I47.29 PAROXYSMAL VENTRICULAR TACHYCARDIA: ICD-10-CM

## 2018-08-15 DIAGNOSIS — Z86.79 S/P CRYOABLATION OF ARRHYTHMIA: ICD-10-CM

## 2018-08-15 DIAGNOSIS — E78.5 HYPERLIPIDEMIA WITH TARGET LDL LESS THAN 130: Chronic | ICD-10-CM

## 2018-08-15 DIAGNOSIS — C50.411 MALIGNANT NEOPLASM OF UPPER-OUTER QUADRANT OF RIGHT BREAST IN FEMALE, ESTROGEN RECEPTOR POSITIVE: ICD-10-CM

## 2018-08-15 DIAGNOSIS — Z79.01 LONG TERM (CURRENT) USE OF ANTICOAGULANTS: Primary | ICD-10-CM

## 2018-08-15 DIAGNOSIS — I95.1 ORTHOSTATIC HYPOTENSION: ICD-10-CM

## 2018-08-15 DIAGNOSIS — Z79.899 AT RISK FOR AMIODARONE TOXICITY WITH LONG TERM USE: ICD-10-CM

## 2018-08-15 DIAGNOSIS — Z91.89 AT RISK FOR AMIODARONE TOXICITY WITH LONG TERM USE: ICD-10-CM

## 2018-08-15 DIAGNOSIS — I48.3 TYPICAL ATRIAL FLUTTER: ICD-10-CM

## 2018-08-15 DIAGNOSIS — I42.1 HOCM (HYPERTROPHIC OBSTRUCTIVE CARDIOMYOPATHY): ICD-10-CM

## 2018-08-15 DIAGNOSIS — Z98.890 S/P CRYOABLATION OF ARRHYTHMIA: ICD-10-CM

## 2018-08-15 DIAGNOSIS — I48.0 PAF (PAROXYSMAL ATRIAL FIBRILLATION): ICD-10-CM

## 2018-08-15 PROBLEM — I48.91 AF (ATRIAL FIBRILLATION): Status: RESOLVED | Noted: 2018-05-14 | Resolved: 2018-08-15

## 2018-08-15 LAB — INR PPP: 2.4 (ref 2–3)

## 2018-08-15 PROCEDURE — 93000 ELECTROCARDIOGRAM COMPLETE: CPT | Mod: S$GLB,,, | Performed by: INTERNAL MEDICINE

## 2018-08-15 PROCEDURE — 85610 PROTHROMBIN TIME: CPT | Mod: QW,S$GLB,, | Performed by: INTERNAL MEDICINE

## 2018-08-15 PROCEDURE — 3078F DIAST BP <80 MM HG: CPT | Mod: CPTII,S$GLB,, | Performed by: INTERNAL MEDICINE

## 2018-08-15 PROCEDURE — 99999 PR PBB SHADOW E&M-EST. PATIENT-LVL III: CPT | Mod: PBBFAC,,, | Performed by: INTERNAL MEDICINE

## 2018-08-15 PROCEDURE — 3075F SYST BP GE 130 - 139MM HG: CPT | Mod: CPTII,S$GLB,, | Performed by: INTERNAL MEDICINE

## 2018-08-15 PROCEDURE — 99215 OFFICE O/P EST HI 40 MIN: CPT | Mod: S$GLB,,, | Performed by: INTERNAL MEDICINE

## 2018-08-15 NOTE — PROGRESS NOTES
Subjective:   Patient ID:  Karena Young is a 78 y.o. female     Chief complaint:Atrial Flutter (s/p RFA May 2018; would like to get back on Eliquis)      HPI    Background as recorded in my note (12/12/16):  73 woman   History of HOCM s/p ETOH ablation 4/06, ventricular tachycardia, ICD, HTN, orthostatic hypotension, and HLP.Also noted to have transient A Flutter and AF in 2009 and 2010. Has had no AF since amiodarone -- She also later had VT and is on amio for that now -- since 2012   ICD initially implanted by Dr Gonzales in 11/04 for syncope with VTNS>>replaced in 6/2008   Had a syncopal spell 2/20/12 -- ICD revealed event at 3:44PM-- 4 sec VTNS, also had NSVT x 7; longest 7 seconds on 1/1/12>>started on amiodarone and most recent ICD interrogation revealed no NSVT.   She has had no further syncope   AF and Aflutter in the past was noted by ICD and ATPed.  Mild CKD -- Cr 1.3 to 1.4 -- stable   CHADS=1 (HTN) on aspirin   DLCO normal 6/5/12, 6/2014 82%, FVC 73% TSH 6/15 was WNL -- 2.5  Has had some OH dizziness    Had ICD replacement and lead was buried deeper in pocket In August 2013 --   Echo from 6/2014:  >>  1 - Concentric remodeling.   2 - Normal left ventricular systolic function (EF 60-65%).   3 - Normal right ventricular systolic function .   4 - Left ventricular diastolic dysfunction.   5 - Severe left atrial enlargement. BAXTER 42  6 - Mild aortic regurgitation.   7 - Mild mitral regurgitation.   8 - Mild pulmonary hypertension.   9 - Mildly elevated central venous pressure.    ECG today shows AR pacing with RBBB-- 60 bpm-      Update till 12/12/16:  She has been on slo mo for a year or more but stable  Gets HOBBS at less than a block  Sleeps 12 hrs a night and then takes a 2 hr nap  She has lost her  recently last sept and she has moved to her sister's house.   ECG today shows AR pacing with FDAVB and RBBB     Update 10/15/17:  She had two ICD shocks last week -- she was ASxc prior.  "The shocks were one at a time --  by 3 min. This was related to A Flutter  She feels O/W well -- no Fc limitations    Update 2/22/18:  She had EP/RFA in November: >>  Pacing demonstrated bidirectional conduction flow across the cavotricuspid isthmus.  Atrial burst pacing was performed and atrial flutter was induced.  This appeared to be atrial type 1 flutter with counterclockwise activation within the right atrium.    The cycle length was 320 milliseconds.  Unfortunately, entrainment pacing was not doable as the tachycardia would either terminate or transform into get an irregular type 2 atrial flutter, which would often terminate spontaneously.  On the strength of the baseline ECG findings, we decided to proceed with standard radiofrequency ablation of the cavotricuspid isthmus.   She was kept on amio and did well till a couple days ago  She has had multiple shocks two days ago -- 5 total shocks in the VT zone -- EGMs c/w AVNRT vs AT with 1:1 AV conduction and long FDAVB,  msec  Closer review suggests the latter with A falling eventually in the blinding period thus initiating "VT" detection.    I have reviewed the actual image of the ECG tracing obtained today and it shows A flutter with 2:1 block -- still looks like CTI but CW. Has RBBB  With QRSd 124.     Update since then:  5/21/18 PVI was done:  1.  Successful pulmonary vein isolation using cryoablation.  All four pulmonary veins were isolated in a WACA type manner.  2.  Successful pre- and post-ablative electrophysiologic study with coronary sinus recording.  3.  Successful pre- and post-ablative electrophysiologic and electroanatomic mapping of pulmonary veins and left atrium.  4.  Successful SVC electroanatomical and electrophysiologic mapping.  This appeared to be silent.  5.  Successful cardioversion.  The patient left the lab in normal sinus rhythm.  6.  Successful pre and postop reprogramming of the patient's ICD.    Has had no issues " since then   ICD eval w/o any A F etc  Current Outpatient Medications   Medication Sig    donepezil (ARICEPT) 10 MG tablet TAKE 1 TABLET(10 MG) BY MOUTH EVERY EVENING    letrozole (FEMARA) 2.5 mg Tab TAKE 1 TABLET(2.5 MG) BY MOUTH EVERY DAY    lisinopril 10 MG tablet TAKE 1 TABLET BY MOUTH EVERY DAY    metoprolol tartrate (LOPRESSOR) 100 MG tablet Take 1 tablet (100 mg total) by mouth 2 (two) times daily.    amiodarone (PACERONE) 200 MG Tab Take 1 tablet (200 mg total) by mouth once daily.    apixaban 5 mg Tab Take 1 tablet (5 mg total) by mouth 2 (two) times daily.    aspirin 81 mg Tab Take 1 tablet by mouth every evening.     atorvastatin (LIPITOR) 20 MG tablet TAKE 1 TABLET BY MOUTH EVERY DAY (Patient taking differently: TAKE 1 TABLET BY MOUTH HS)    B2/VITS A,C,E/LUT/ZEAXANTH/MIN (ICAPS ORAL) Take by mouth 2 (two) times daily.    calcium citrate-vitamin D3 315-200 mg (CITRACAL+D) 315-200 mg-unit per tablet Take 1 tablet by mouth 2 (two) times daily.      cholecalciferol, vitamin D3, 1,000 unit capsule Take 1 capsule (1,000 Units total) by mouth once daily.    ondansetron (ZOFRAN) 4 MG tablet Take 1 tablet (4 mg total) by mouth every 8 (eight) hours as needed for Nausea.    walker (ULTRA-LIGHT ROLLATOR) Misc 1 each by Misc.(Non-Drug; Combo Route) route daily as needed.     No current facility-administered medications for this visit.      Review of Systems   Constitution: Positive for weight loss. Negative for decreased appetite, weakness and weight gain.   HENT: Negative for nosebleeds.    Eyes: Negative for blurred vision and visual disturbance.   Cardiovascular: Negative for chest pain, claudication, cyanosis, dyspnea on exertion, irregular heartbeat, leg swelling, near-syncope, orthopnea, palpitations, paroxysmal nocturnal dyspnea and syncope.   Respiratory: Negative for cough, shortness of breath and wheezing.    Endocrine: Negative for heat intolerance.   Hematologic/Lymphatic: Bruises/bleeds  easily.   Skin: Negative for rash.   Musculoskeletal: Positive for arthritis and stiffness. Negative for muscle weakness and myalgias.   Gastrointestinal: Negative for abdominal pain, anorexia, melena, nausea and vomiting.   Genitourinary: Negative for menorrhagia.   Neurological: Negative for excessive daytime sleepiness, dizziness, headaches, loss of balance, seizures and vertigo.   Psychiatric/Behavioral: Negative for altered mental status and depression. The patient is not nervous/anxious.        Objective:   Physical Exam   Constitutional: She is oriented to person, place, and time. She appears well-developed and well-nourished.   HENT:   Head: Normocephalic and atraumatic.   Right Ear: External ear normal.   Left Ear: External ear normal.   Neck: Normal range of motion. Neck supple. No thyromegaly present.   Cardiovascular: Normal rate, regular rhythm, normal heart sounds and intact distal pulses. Exam reveals no gallop, no S3, no S4, no friction rub, no midsystolic click and no opening snap.   No murmur heard.  Pulses:       Carotid pulses are 2+ on the right side, and 2+ on the left side.       Radial pulses are 2+ on the right side, and 2+ on the left side.        Dorsalis pedis pulses are 2+ on the right side, and 2+ on the left side.        Posterior tibial pulses are 2+ on the right side, and 2+ on the left side.   Pulmonary/Chest: Effort normal and breath sounds normal.   Abdominal: Soft. She exhibits no distension. There is no tenderness.   Musculoskeletal:        Right ankle: She exhibits no swelling.        Left ankle: She exhibits no swelling.        Right lower leg: She exhibits no swelling.        Left lower leg: She exhibits no swelling.   Neurological: She is alert and oriented to person, place, and time. She has normal strength. No cranial nerve deficit. Gait normal.   Skin: Skin is warm. No rash noted. No cyanosis. Nails show no clubbing.   Psychiatric: She has a normal mood and affect. Her  "speech is normal and behavior is normal. Thought content normal. Cognition and memory are normal.   Nursing note and vitals reviewed.    /76   Pulse 72   Ht 5' 4" (1.626 m)   Wt 50.3 kg (111 lb)   BMI 19.05 kg/m²      Assessment:      1. Paroxysmal atrial fibrillation    2. Paroxysmal ventricular tachycardia    3. Typical atrial flutter    4. RBBB    5. Orthostatic hypotension    6. HOCM (hypertrophic obstructive cardiomyopathy)    7. Hyperlipidemia with target LDL less than 130    8. Malignant neoplasm of upper-outer quadrant of right breast in female, estrogen receptor positive    9. Subclinical hypothyroidism    10. Implantable cardioverter-defibrillator (ICD) in situ    11. At risk for amiodarone toxicity with long term use    12. S/P cryoablation of arrhythmia        Plan:    She has continued to be on coumadin post PVI despite our intent to switch to a NOAC -- will do so now  Orders Placed This Encounter   Procedures    Amiodarone level     Standing Status:   Future     Standing Expiration Date:   10/14/2019    Spirometry with/without bronchodilator & DLCO ([facility] Only)     Standing Status:   Future     Standing Expiration Date:   8/15/2019     Follow-up in about 6 months (around 2/15/2019).  Medications Discontinued During This Encounter   Medication Reason    warfarin (COUMADIN) 1 MG tablet Alternate therapy     New Prescriptions    APIXABAN 5 MG TAB    Take 1 tablet (5 mg total) by mouth 2 (two) times daily.     Modified Medications    No medications on file              "

## 2018-08-15 NOTE — PROGRESS NOTES
Patient's INR is therapeutic at 2.4.  No changes in dose.  Recheck on 9/12/2018 at lab.  Please call should you have any questions or concerns at [phone] or [phone].

## 2018-08-27 DIAGNOSIS — Z79.811 AROMATASE INHIBITOR USE: ICD-10-CM

## 2018-08-27 DIAGNOSIS — C50.411 MALIGNANT NEOPLASM OF UPPER-OUTER QUADRANT OF RIGHT BREAST IN FEMALE, ESTROGEN RECEPTOR POSITIVE: ICD-10-CM

## 2018-08-27 DIAGNOSIS — Z17.0 MALIGNANT NEOPLASM OF UPPER-OUTER QUADRANT OF RIGHT BREAST IN FEMALE, ESTROGEN RECEPTOR POSITIVE: ICD-10-CM

## 2018-08-28 RX ORDER — LETROZOLE 2.5 MG/1
TABLET, FILM COATED ORAL
Qty: 30 TABLET | Refills: 0 | Status: SHIPPED | OUTPATIENT
Start: 2018-08-28 | End: 2018-09-25 | Stop reason: SDUPTHER

## 2018-09-13 DIAGNOSIS — I48.3 TYPICAL ATRIAL FLUTTER: ICD-10-CM

## 2018-09-13 DIAGNOSIS — I45.10 RBBB: ICD-10-CM

## 2018-09-13 DIAGNOSIS — Z95.810 AICD (AUTOMATIC CARDIOVERTER/DEFIBRILLATOR) PRESENT: Primary | ICD-10-CM

## 2018-09-13 DIAGNOSIS — I48.0 PAF (PAROXYSMAL ATRIAL FIBRILLATION): ICD-10-CM

## 2018-09-17 ENCOUNTER — CLINICAL SUPPORT (OUTPATIENT)
Dept: ELECTROPHYSIOLOGY | Facility: CLINIC | Age: 78
End: 2018-09-17
Attending: INTERNAL MEDICINE
Payer: COMMERCIAL

## 2018-09-17 DIAGNOSIS — Z95.810 PRESENCE OF AUTOMATIC CARDIOVERTER/DEFIBRILLATOR (AICD): ICD-10-CM

## 2018-09-17 PROCEDURE — 93296 REM INTERROG EVL PM/IDS: CPT | Mod: S$GLB,,, | Performed by: INTERNAL MEDICINE

## 2018-09-17 PROCEDURE — 93295 DEV INTERROG REMOTE 1/2/MLT: CPT | Mod: S$GLB,,, | Performed by: INTERNAL MEDICINE

## 2018-09-18 ENCOUNTER — ANTI-COAG VISIT (OUTPATIENT)
Dept: CARDIOLOGY | Facility: CLINIC | Age: 78
End: 2018-09-18

## 2018-09-25 DIAGNOSIS — Z17.0 MALIGNANT NEOPLASM OF UPPER-OUTER QUADRANT OF RIGHT BREAST IN FEMALE, ESTROGEN RECEPTOR POSITIVE: ICD-10-CM

## 2018-09-25 DIAGNOSIS — Z79.811 AROMATASE INHIBITOR USE: ICD-10-CM

## 2018-09-25 DIAGNOSIS — C50.411 MALIGNANT NEOPLASM OF UPPER-OUTER QUADRANT OF RIGHT BREAST IN FEMALE, ESTROGEN RECEPTOR POSITIVE: ICD-10-CM

## 2018-09-25 RX ORDER — LETROZOLE 2.5 MG/1
TABLET, FILM COATED ORAL
Qty: 30 TABLET | Refills: 0 | Status: SHIPPED | OUTPATIENT
Start: 2018-09-25 | End: 2018-10-23 | Stop reason: SDUPTHER

## 2018-10-09 ENCOUNTER — LAB VISIT (OUTPATIENT)
Dept: LAB | Facility: HOSPITAL | Age: 78
End: 2018-10-09
Attending: FAMILY MEDICINE
Payer: COMMERCIAL

## 2018-10-09 DIAGNOSIS — Z91.89 AT RISK FOR AMIODARONE TOXICITY WITH LONG TERM USE: ICD-10-CM

## 2018-10-09 DIAGNOSIS — Z79.899 AT RISK FOR AMIODARONE TOXICITY WITH LONG TERM USE: ICD-10-CM

## 2018-10-09 DIAGNOSIS — I10 ESSENTIAL HYPERTENSION: ICD-10-CM

## 2018-10-09 LAB
CHOLEST SERPL-MCNC: 144 MG/DL
CHOLEST/HDLC SERPL: 2.7 {RATIO}
HDLC SERPL-MCNC: 54 MG/DL
HDLC SERPL: 37.5 %
LDLC SERPL CALC-MCNC: 68.4 MG/DL
NONHDLC SERPL-MCNC: 90 MG/DL
TRIGL SERPL-MCNC: 108 MG/DL

## 2018-10-09 PROCEDURE — 36415 COLL VENOUS BLD VENIPUNCTURE: CPT | Mod: PO

## 2018-10-09 PROCEDURE — 80061 LIPID PANEL: CPT

## 2018-10-09 PROCEDURE — 80299 QUANTITATIVE ASSAY DRUG: CPT

## 2018-10-12 LAB
AMIODARONE FLD-MCNC: 0.9 UG/ML (ref 1–3)
N-DESETHYL-AMIODARONE: 1.1 UG/ML

## 2018-10-15 ENCOUNTER — TELEPHONE (OUTPATIENT)
Dept: CARDIOLOGY | Facility: CLINIC | Age: 78
End: 2018-10-15

## 2018-10-15 NOTE — TELEPHONE ENCOUNTER
Telephoned results of lab work to patient's daughter, Alexandra and no medication changes.  Confirmed follow up in 6 months requested Jim Palacio

## 2018-10-15 NOTE — TELEPHONE ENCOUNTER
----- Message from Jake Hauser MD sent at 10/12/2018  5:09 PM CDT -----  See comments below and call patient to discuss.   Please close encounter when done -- no need to route back to me.  Thanks    In good range

## 2018-10-16 ENCOUNTER — OFFICE VISIT (OUTPATIENT)
Dept: INTERNAL MEDICINE | Facility: CLINIC | Age: 78
End: 2018-10-16
Payer: COMMERCIAL

## 2018-10-16 VITALS
WEIGHT: 119.06 LBS | DIASTOLIC BLOOD PRESSURE: 80 MMHG | HEART RATE: 62 BPM | TEMPERATURE: 99 F | BODY MASS INDEX: 21.09 KG/M2 | HEIGHT: 63 IN | SYSTOLIC BLOOD PRESSURE: 138 MMHG

## 2018-10-16 DIAGNOSIS — I42.1 HOCM (HYPERTROPHIC OBSTRUCTIVE CARDIOMYOPATHY): ICD-10-CM

## 2018-10-16 DIAGNOSIS — R41.3 MEMORY LOSS: ICD-10-CM

## 2018-10-16 DIAGNOSIS — N18.30 CKD (CHRONIC KIDNEY DISEASE) STAGE 3, GFR 30-59 ML/MIN: ICD-10-CM

## 2018-10-16 DIAGNOSIS — I48.20 CHRONIC ATRIAL FIBRILLATION: ICD-10-CM

## 2018-10-16 DIAGNOSIS — I10 ESSENTIAL HYPERTENSION: Primary | ICD-10-CM

## 2018-10-16 PROCEDURE — G0008 ADMIN INFLUENZA VIRUS VAC: HCPCS | Mod: S$GLB,,, | Performed by: FAMILY MEDICINE

## 2018-10-16 PROCEDURE — 99999 PR PBB SHADOW E&M-EST. PATIENT-LVL III: CPT | Mod: PBBFAC,,, | Performed by: FAMILY MEDICINE

## 2018-10-16 PROCEDURE — 90662 IIV NO PRSV INCREASED AG IM: CPT | Mod: S$GLB,,, | Performed by: FAMILY MEDICINE

## 2018-10-16 PROCEDURE — 1101F PT FALLS ASSESS-DOCD LE1/YR: CPT | Mod: CPTII,S$GLB,, | Performed by: FAMILY MEDICINE

## 2018-10-16 PROCEDURE — 3079F DIAST BP 80-89 MM HG: CPT | Mod: CPTII,S$GLB,, | Performed by: FAMILY MEDICINE

## 2018-10-16 PROCEDURE — 3075F SYST BP GE 130 - 139MM HG: CPT | Mod: CPTII,S$GLB,, | Performed by: FAMILY MEDICINE

## 2018-10-16 PROCEDURE — 99214 OFFICE O/P EST MOD 30 MIN: CPT | Mod: 25,S$GLB,, | Performed by: FAMILY MEDICINE

## 2018-10-16 NOTE — PROGRESS NOTES
Subjective:      Patient ID: Karena Young is a 78 y.o. female.    Chief Complaint: Follow-up (6 mo f/u )    HPI  77 yo female brought by daughter for f/u.  She is stable on her meds.  Followed by Cards as well, on amiodarone.    No falls.  Mood is good.  Memory is about the same.  Has day time care taker.  Appetite is good.  Down about 3 lbs  Normal BMs    Past Medical History:   Diagnosis Date    *Atrial fibrillation     *Atrial flutter     Anticoagulant long-term use     Breast cancer     Cardiomyopathy     HOCM (hypertrophic obstructive cardiomyopathy) 9/6/2013    Hyperlipidemia     Hypertension     Macular degeneration     Orthostatic hypotension     Paroxysmal atrial fibrillation 9/12/2012    Ventricular tachycardia      Family History   Problem Relation Age of Onset    Cancer Mother     Heart disease Mother     Heart disease Father     Cancer Brother 60     Past Surgical History:   Procedure Laterality Date    ABLATION N/A 5/14/2018    Performed by [facility]er [facility] at [facility] CATH LAB    ABLATION N/A 2/26/2018    Performed by [facility] at [facility] CATH LAB    ABLATION N/A 11/21/2017    Performed by [facility] at [facility] CATH LAB    BIOPSY-EXCISIONAL Right 5/5/2015    Per[facility] by [facility]co [facility] at [facility] OR    BREAST BIOPSY      CARDIAC ELECTROPHYSIOLOGY STUDY AND ABLATION  4/06    CATARACT EXTRACTION      ICD implantation      INSERT / REPLACE / REMOVE PACEMAKER      RADIOFREQUENCY ABLATION      REPLACEMENT, PULSE GENERATOR, CARDIAC PACEMAKER N/A 8/21/2013    [facility] CATH LAB    TRANSESOPHAGEAL ECHOCARDIOGRAM (MARIAELENA) N/A 5/14/2018    Performed by Jake [facility]er [facility] at [facility] CATH LAB     Social History     Tobacco Use    Smoking status: Never Smoker    Smokeless tobacco: Never Used   Substance Use Topics    Alcohol use: No    Drug use: No       /80 (BP Location: Left arm, Patient Position: Sitting, BP  "Method: Large (Manual))   Pulse 62   Temp 98.7 °F (37.1 °C) (Tympanic)   Ht 5' 3.25" (1.607 m)   Wt 54 kg (119 lb 0.8 oz)   BMI 20.92 kg/m²     Review of Systems   Constitutional: Positive for activity change. Negative for appetite change, chills, diaphoresis, fatigue, fever and unexpected weight change.   HENT: Negative for ear pain, hearing loss, postnasal drip, rhinorrhea and tinnitus.    Eyes: Negative for visual disturbance.   Respiratory: Negative for cough, shortness of breath and wheezing.    Cardiovascular: Negative for chest pain, palpitations and leg swelling.   Gastrointestinal: Negative for abdominal distention, abdominal pain, constipation and diarrhea.   Genitourinary: Negative for dysuria, frequency, hematuria and urgency.   Musculoskeletal: Negative for back pain and joint swelling.   Neurological: Negative for weakness and headaches.   Psychiatric/Behavioral: Negative for agitation.       Objective:     Physical Exam   Constitutional: She is oriented to person, place, and time. She appears well-developed and well-nourished. No distress.   HENT:   Right Ear: External ear normal.   Left Ear: External ear normal.   Nose: Nose normal.   Mouth/Throat: Oropharynx is clear and moist.   Eyes: Conjunctivae are normal. Pupils are equal, round, and reactive to light.   Neck: Normal range of motion. Neck supple. Carotid bruit is not present.   Cardiovascular: Normal rate, regular rhythm and normal heart sounds.   Pulmonary/Chest: Effort normal and breath sounds normal. No respiratory distress. She has no wheezes. She has no rales.   Abdominal: Soft. Bowel sounds are normal. She exhibits no distension. There is no tenderness. There is no guarding.   Musculoskeletal: She exhibits no edema.   Neurological: She is alert and oriented to person, place, and time. No cranial nerve deficit.   Skin: Skin is warm and dry. No rash noted.   Psychiatric: She has a normal mood and affect. Her behavior is normal. " Judgment and thought content normal.   Nursing note and vitals reviewed.      Lab Results   Component Value Date    WBC 5.12 07/17/2018    HGB 13.6 07/17/2018    HCT 42.5 07/17/2018     07/17/2018    CHOL 144 10/09/2018    TRIG 108 10/09/2018    HDL 54 10/09/2018    ALT 28 07/17/2018    AST 32 07/17/2018     07/17/2018    K 3.8 07/17/2018     07/17/2018    CREATININE 1.1 07/17/2018    BUN 22 07/17/2018    CO2 30 (H) 07/17/2018    TSH 2.493 07/17/2018    INR 2.4 08/15/2018       Assessment:     1. Essential hypertension    2. HOCM (hypertrophic obstructive cardiomyopathy)    3. Chronic atrial fibrillation    4. Memory loss    5. CKD (chronic kidney disease) stage 3, GFR 30-59 ml/min         Plan:     Essential hypertension    HOCM (hypertrophic obstructive cardiomyopathy)    Chronic atrial fibrillation    Memory loss    CKD (chronic kidney disease) stage 3, GFR 30-59 ml/min    Other orders  -     Influenza - High Dose (65+) (PF) (IM)    BP stable  Reviewed labs, lipids stable  HR stable  Cont current meds  Cont the aricept  Cont with specialty f/u  Flu shot today  Stay well hydrated  Fall precautions  Update DEXA  Declines further mammogram at this age.  F/u 6 mos

## 2018-10-23 DIAGNOSIS — Z17.0 MALIGNANT NEOPLASM OF UPPER-OUTER QUADRANT OF RIGHT BREAST IN FEMALE, ESTROGEN RECEPTOR POSITIVE: ICD-10-CM

## 2018-10-23 DIAGNOSIS — Z79.811 AROMATASE INHIBITOR USE: ICD-10-CM

## 2018-10-23 DIAGNOSIS — C50.411 MALIGNANT NEOPLASM OF UPPER-OUTER QUADRANT OF RIGHT BREAST IN FEMALE, ESTROGEN RECEPTOR POSITIVE: ICD-10-CM

## 2018-10-25 RX ORDER — LETROZOLE 2.5 MG/1
TABLET, FILM COATED ORAL
Qty: 30 TABLET | Refills: 0 | Status: SHIPPED | OUTPATIENT
Start: 2018-10-25 | End: 2018-11-18 | Stop reason: SDUPTHER

## 2018-11-08 DIAGNOSIS — I48.20 CHRONIC ATRIAL FIBRILLATION: ICD-10-CM

## 2018-11-10 RX ORDER — AMIODARONE HYDROCHLORIDE 200 MG/1
TABLET ORAL
Qty: 30 TABLET | Refills: 0 | Status: SHIPPED | OUTPATIENT
Start: 2018-11-10 | End: 2018-12-17 | Stop reason: SDUPTHER

## 2018-11-18 DIAGNOSIS — C50.411 MALIGNANT NEOPLASM OF UPPER-OUTER QUADRANT OF RIGHT BREAST IN FEMALE, ESTROGEN RECEPTOR POSITIVE: ICD-10-CM

## 2018-11-18 DIAGNOSIS — Z17.0 MALIGNANT NEOPLASM OF UPPER-OUTER QUADRANT OF RIGHT BREAST IN FEMALE, ESTROGEN RECEPTOR POSITIVE: ICD-10-CM

## 2018-11-18 DIAGNOSIS — Z79.811 AROMATASE INHIBITOR USE: ICD-10-CM

## 2018-11-19 RX ORDER — LETROZOLE 2.5 MG/1
TABLET, FILM COATED ORAL
Qty: 30 TABLET | Refills: 0 | Status: SHIPPED | OUTPATIENT
Start: 2018-11-19 | End: 2019-01-07 | Stop reason: SDUPTHER

## 2018-12-05 RX ORDER — METOPROLOL TARTRATE 100 MG/1
100 TABLET ORAL 2 TIMES DAILY
Qty: 60 TABLET | Refills: 11 | Status: SHIPPED | OUTPATIENT
Start: 2018-12-05 | End: 2019-11-21 | Stop reason: SDUPTHER

## 2018-12-05 NOTE — TELEPHONE ENCOUNTER
----- Message from Sinan Brooke sent at 12/5/2018 10:40 AM CST -----  Contact: pt's daughter  1. What is the name of the medication you are requesting? Metoprolol Tartrate  2. What is the dose? 100 mg  3. How do you take the medication? Orally, topically, etc? Orally  4. How often do you take this medication? Twice daily  5. Do you need a 30 day or 90 day supply? 90  6. How many refills are you requesting? 4  7. What is your preferred pharmacy and location of the pharmacy? [facility] in [place]  8. Who can we contact with further questions? [phone] (cell)    Pt is out of her medication...

## 2018-12-17 DIAGNOSIS — I48.20 CHRONIC ATRIAL FIBRILLATION: ICD-10-CM

## 2018-12-17 RX ORDER — AMIODARONE HYDROCHLORIDE 200 MG/1
TABLET ORAL
Qty: 30 TABLET | Refills: 0 | Status: SHIPPED | OUTPATIENT
Start: 2018-12-17 | End: 2019-01-10 | Stop reason: SDUPTHER

## 2019-01-03 DIAGNOSIS — I45.10 RBBB: ICD-10-CM

## 2019-01-03 DIAGNOSIS — Z95.810 AICD (AUTOMATIC CARDIOVERTER/DEFIBRILLATOR) PRESENT: Primary | ICD-10-CM

## 2019-01-07 DIAGNOSIS — C50.411 MALIGNANT NEOPLASM OF UPPER-OUTER QUADRANT OF RIGHT BREAST IN FEMALE, ESTROGEN RECEPTOR POSITIVE: ICD-10-CM

## 2019-01-07 DIAGNOSIS — Z17.0 MALIGNANT NEOPLASM OF UPPER-OUTER QUADRANT OF RIGHT BREAST IN FEMALE, ESTROGEN RECEPTOR POSITIVE: ICD-10-CM

## 2019-01-07 DIAGNOSIS — Z79.811 AROMATASE INHIBITOR USE: ICD-10-CM

## 2019-01-10 DIAGNOSIS — I48.20 CHRONIC ATRIAL FIBRILLATION: ICD-10-CM

## 2019-01-10 RX ORDER — AMIODARONE HYDROCHLORIDE 200 MG/1
TABLET ORAL
Qty: 30 TABLET | Refills: 0 | Status: SHIPPED | OUTPATIENT
Start: 2019-01-10 | End: 2019-02-06 | Stop reason: SDUPTHER

## 2019-01-15 RX ORDER — LETROZOLE 2.5 MG/1
TABLET, FILM COATED ORAL
Qty: 30 TABLET | Refills: 0 | Status: SHIPPED | OUTPATIENT
Start: 2019-01-15 | End: 2019-02-21 | Stop reason: SDUPTHER

## 2019-02-06 DIAGNOSIS — I48.20 CHRONIC ATRIAL FIBRILLATION: ICD-10-CM

## 2019-02-06 RX ORDER — AMIODARONE HYDROCHLORIDE 200 MG/1
TABLET ORAL
Qty: 30 TABLET | Refills: 0 | Status: SHIPPED | OUTPATIENT
Start: 2019-02-06 | End: 2019-03-04 | Stop reason: SDUPTHER

## 2019-02-07 ENCOUNTER — TELEPHONE (OUTPATIENT)
Dept: CARDIOLOGY | Facility: HOSPITAL | Age: 79
End: 2019-02-07

## 2019-02-07 NOTE — TELEPHONE ENCOUNTER
Patient was due for a manual remote pacemaker transmission on Monday 02/04/2019. I have not received the transmission as of today. I called patients daughter, Alexandra. No answer. I left a voice message for her to send her mothers transmission or to return my call. I left my direct number.

## 2019-02-07 NOTE — TELEPHONE ENCOUNTER
Spoke with Patients daughter, Alexandra. She informed me that her mothers monitor is not working. She stated she will call [employer] and try to get a new monitor. She will call me back to let me know what they say.

## 2019-02-10 DIAGNOSIS — C50.411 MALIGNANT NEOPLASM OF UPPER-OUTER QUADRANT OF RIGHT BREAST IN FEMALE, ESTROGEN RECEPTOR POSITIVE: ICD-10-CM

## 2019-02-10 DIAGNOSIS — Z17.0 MALIGNANT NEOPLASM OF UPPER-OUTER QUADRANT OF RIGHT BREAST IN FEMALE, ESTROGEN RECEPTOR POSITIVE: ICD-10-CM

## 2019-02-10 DIAGNOSIS — Z79.811 AROMATASE INHIBITOR USE: ICD-10-CM

## 2019-02-11 RX ORDER — LETROZOLE 2.5 MG/1
TABLET, FILM COATED ORAL
Qty: 30 TABLET | Refills: 2 | Status: SHIPPED | OUTPATIENT
Start: 2019-02-11 | End: 2019-05-15 | Stop reason: SDUPTHER

## 2019-02-12 ENCOUNTER — OFFICE VISIT (OUTPATIENT)
Dept: HEMATOLOGY/ONCOLOGY | Facility: CLINIC | Age: 79
End: 2019-02-12
Payer: COMMERCIAL

## 2019-02-12 ENCOUNTER — LAB VISIT (OUTPATIENT)
Dept: LAB | Facility: HOSPITAL | Age: 79
End: 2019-02-12
Payer: COMMERCIAL

## 2019-02-12 ENCOUNTER — INFUSION (OUTPATIENT)
Dept: INFUSION THERAPY | Facility: HOSPITAL | Age: 79
End: 2019-02-12
Attending: INTERNAL MEDICINE
Payer: COMMERCIAL

## 2019-02-12 VITALS
RESPIRATION RATE: 20 BRPM | TEMPERATURE: 98 F | DIASTOLIC BLOOD PRESSURE: 73 MMHG | HEART RATE: 67 BPM | SYSTOLIC BLOOD PRESSURE: 128 MMHG | HEIGHT: 63 IN | WEIGHT: 119.69 LBS | BODY MASS INDEX: 21.21 KG/M2 | OXYGEN SATURATION: 99 %

## 2019-02-12 DIAGNOSIS — Z17.0 MALIGNANT NEOPLASM OF UPPER-OUTER QUADRANT OF RIGHT BREAST IN FEMALE, ESTROGEN RECEPTOR POSITIVE: Primary | ICD-10-CM

## 2019-02-12 DIAGNOSIS — M85.89 OSTEOPENIA OF MULTIPLE SITES: Primary | Chronic | ICD-10-CM

## 2019-02-12 DIAGNOSIS — Z17.0 MALIGNANT NEOPLASM OF UPPER-OUTER QUADRANT OF RIGHT BREAST IN FEMALE, ESTROGEN RECEPTOR POSITIVE: ICD-10-CM

## 2019-02-12 DIAGNOSIS — C50.411 MALIGNANT NEOPLASM OF UPPER-OUTER QUADRANT OF RIGHT BREAST IN FEMALE, ESTROGEN RECEPTOR POSITIVE: Primary | ICD-10-CM

## 2019-02-12 DIAGNOSIS — M85.89 OSTEOPENIA OF MULTIPLE SITES: ICD-10-CM

## 2019-02-12 DIAGNOSIS — C50.411 MALIGNANT NEOPLASM OF UPPER-OUTER QUADRANT OF RIGHT BREAST IN FEMALE, ESTROGEN RECEPTOR POSITIVE: ICD-10-CM

## 2019-02-12 DIAGNOSIS — Z79.811 AROMATASE INHIBITOR USE: ICD-10-CM

## 2019-02-12 LAB
ALBUMIN SERPL BCP-MCNC: 3.4 G/DL
ALP SERPL-CCNC: 103 U/L
ALT SERPL W/O P-5'-P-CCNC: 37 U/L
ANION GAP SERPL CALC-SCNC: 9 MMOL/L
AST SERPL-CCNC: 33 U/L
BASOPHILS # BLD AUTO: 0.03 K/UL
BASOPHILS NFR BLD: 0.5 %
BILIRUB SERPL-MCNC: 0.4 MG/DL
BUN SERPL-MCNC: 26 MG/DL
CALCIUM SERPL-MCNC: 9.7 MG/DL
CHLORIDE SERPL-SCNC: 102 MMOL/L
CO2 SERPL-SCNC: 30 MMOL/L
CREAT SERPL-MCNC: 1.1 MG/DL
DIFFERENTIAL METHOD: ABNORMAL
EOSINOPHIL # BLD AUTO: 0.1 K/UL
EOSINOPHIL NFR BLD: 1.6 %
ERYTHROCYTE [DISTWIDTH] IN BLOOD BY AUTOMATED COUNT: 13 %
EST. GFR  (AFRICAN AMERICAN): 56 ML/MIN/1.73 M^2
EST. GFR  (NON AFRICAN AMERICAN): 48 ML/MIN/1.73 M^2
GLUCOSE SERPL-MCNC: 99 MG/DL
HCT VFR BLD AUTO: 39.6 %
HGB BLD-MCNC: 12.8 G/DL
IMM GRANULOCYTES # BLD AUTO: 0.02 K/UL
IMM GRANULOCYTES NFR BLD AUTO: 0.3 %
LYMPHOCYTES # BLD AUTO: 0.9 K/UL
LYMPHOCYTES NFR BLD: 13.8 %
MCH RBC QN AUTO: 31 PG
MCHC RBC AUTO-ENTMCNC: 32.3 G/DL
MCV RBC AUTO: 96 FL
MONOCYTES # BLD AUTO: 0.6 K/UL
MONOCYTES NFR BLD: 10.1 %
NEUTROPHILS # BLD AUTO: 4.6 K/UL
NEUTROPHILS NFR BLD: 74 %
NRBC BLD-RTO: 0 /100 WBC
PLATELET # BLD AUTO: 227 K/UL
PMV BLD AUTO: 9.8 FL
POTASSIUM SERPL-SCNC: 3.9 MMOL/L
PROT SERPL-MCNC: 6.6 G/DL
RBC # BLD AUTO: 4.13 M/UL
SODIUM SERPL-SCNC: 141 MMOL/L
WBC # BLD AUTO: 6.22 K/UL

## 2019-02-12 PROCEDURE — 1101F PT FALLS ASSESS-DOCD LE1/YR: CPT | Mod: CPTII,S$GLB,, | Performed by: INTERNAL MEDICINE

## 2019-02-12 PROCEDURE — 3078F PR MOST RECENT DIASTOLIC BLOOD PRESSURE < 80 MM HG: ICD-10-PCS | Mod: CPTII,S$GLB,, | Performed by: INTERNAL MEDICINE

## 2019-02-12 PROCEDURE — 1101F PR PT FALLS ASSESS DOC 0-1 FALLS W/OUT INJ PAST YR: ICD-10-PCS | Mod: CPTII,S$GLB,, | Performed by: INTERNAL MEDICINE

## 2019-02-12 PROCEDURE — 99999 PR PBB SHADOW E&M-EST. PATIENT-LVL III: ICD-10-PCS | Mod: PBBFAC,,, | Performed by: INTERNAL MEDICINE

## 2019-02-12 PROCEDURE — 36415 COLL VENOUS BLD VENIPUNCTURE: CPT

## 2019-02-12 PROCEDURE — 3074F PR MOST RECENT SYSTOLIC BLOOD PRESSURE < 130 MM HG: ICD-10-PCS | Mod: CPTII,S$GLB,, | Performed by: INTERNAL MEDICINE

## 2019-02-12 PROCEDURE — 80053 COMPREHEN METABOLIC PANEL: CPT

## 2019-02-12 PROCEDURE — 99214 PR OFFICE/OUTPT VISIT, EST, LEVL IV, 30-39 MIN: ICD-10-PCS | Mod: S$GLB,,, | Performed by: INTERNAL MEDICINE

## 2019-02-12 PROCEDURE — 85025 COMPLETE CBC W/AUTO DIFF WBC: CPT

## 2019-02-12 PROCEDURE — 63600175 PHARM REV CODE 636 W HCPCS: Mod: JG | Performed by: INTERNAL MEDICINE

## 2019-02-12 PROCEDURE — 3074F SYST BP LT 130 MM HG: CPT | Mod: CPTII,S$GLB,, | Performed by: INTERNAL MEDICINE

## 2019-02-12 PROCEDURE — 3078F DIAST BP <80 MM HG: CPT | Mod: CPTII,S$GLB,, | Performed by: INTERNAL MEDICINE

## 2019-02-12 PROCEDURE — 99999 PR PBB SHADOW E&M-EST. PATIENT-LVL III: CPT | Mod: PBBFAC,,, | Performed by: INTERNAL MEDICINE

## 2019-02-12 PROCEDURE — 99214 OFFICE O/P EST MOD 30 MIN: CPT | Mod: S$GLB,,, | Performed by: INTERNAL MEDICINE

## 2019-02-12 PROCEDURE — 96372 THER/PROPH/DIAG INJ SC/IM: CPT

## 2019-02-12 RX ADMIN — DENOSUMAB 60 MG: 60 INJECTION SUBCUTANEOUS at 02:02

## 2019-02-12 NOTE — PROGRESS NOTES
Hematology/Oncology Office Note  [facility] Diagnosis: Right sided breast cancer, ER+/FL neg/Vok6Lup neg     Stage: Unknown. pT1b     Pathology: Infiltrating ductal carcinoma, 0.6cm, margins negative. ER+/FL neg/[id number] neg.     Prior Treatment: Right breast lumpectomy on 5/5/15 by Dr. Pacheco     Current Treatment: Adjuvant endocrine therapy, started 6/2015         CC:  Breast cancer    Referred by:  No ref. provider found    History of Present Illness:  Mr. Young is a 76 y/o female with hypertrophic cardiomyopathy, ICD, and right-sided breast cancer. She had an abnormal screening mammogram in March 2015, followed by abnormal diagnostic mammogram and breast ultrasound. She underwent initial biopsy at [facility], which was positive for breast cancer. She then underwent R breast lumpectomy by Dr. Pacheco on 5/5/15. She did not undergo sentinel LN dissection due to comorbidities and feeling that she would likely not be a candidate for chemotherapy. After our intial meeting, we did discuss that due to her decreased performance status and cardiomyopathy, she was not a candidate for chemotherapy. She also met with Radiation Oncology. They are also recommended against adjuvant radiation. She is taking adjuvant Letrozole. She denies any recurrent hospitalizations for health problems aside from cardiac procedures for ICD/pacemaker implantation, ablation surgery. No recurrent infections. No chronic LE edema. No pulmonary edema. She has hypertrophic obstructive cardiomyopathy, causing diastolic dysfunction, but does not require Lasix. She does have significant SOB and HOBBS. She gets quite winded just walked on her driveway to her mailbox. She cannot walk a full block without stopping to rest.        I have reviewed and updated the HPI, ROS, PMHx, Social Hx, Family Hx and treatment history.    Today's visit:  Patient presents for routine follow-up today.  She is scheduled to begin treatment with Prolia for a induced  osteopenia    Past Medical History:   Diagnosis Date    *Atrial fibrillation     *Atrial flutter     Anticoagulant long-term use     Breast cancer     Cardiomyopathy     HOCM (hypertrophic obstructive cardiomyopathy) 9/6/2013    Hyperlipidemia     Hypertension     Macular degeneration     Orthostatic hypotension     Paroxysmal atrial fibrillation 9/12/2012    Ventricular tachycardia          Social History:  No tobacco, alcohol, or illicit drugs      Family History: family history includes Cancer in her mother; Cancer (age of onset: 60) in her brother; Heart disease in her father and mother.        HPI    Review of Systems   Constitutional: Positive for appetite change. Negative for activity change, fatigue, fever and unexpected weight change.   HENT: Negative for congestion, ear discharge, ear pain, facial swelling, mouth sores and nosebleeds.    Eyes: Negative.    Respiratory: Negative for apnea, cough, shortness of breath and stridor.    Cardiovascular: Negative for chest pain, palpitations and leg swelling.   Gastrointestinal: Negative for abdominal distention, abdominal pain, constipation, diarrhea and nausea.   Endocrine: Negative.    Genitourinary: Negative for difficulty urinating, dyspareunia, enuresis, frequency, hematuria, menstrual problem and pelvic pain.   Musculoskeletal: Negative for arthralgias, back pain, myalgias and neck pain.   Skin: Negative for color change, pallor and rash.   Allergic/Immunologic: Negative.  Negative for environmental allergies.   Neurological: Negative for dizziness, tremors, seizures, syncope, facial asymmetry, speech difficulty, light-headedness, numbness and headaches.   Hematological: Negative for adenopathy. Does not bruise/bleed easily.   Psychiatric/Behavioral: Negative for agitation, decreased concentration, dysphoric mood and hallucinations. The patient is not hyperactive.        Objective:       Vitals:    02/12/19 1351   BP: 128/73   Pulse: 67   Resp:  "20   Temp: 97.6 °F (36.4 °C)   TempSrc: Oral   SpO2: 99%   Weight: 54.3 kg (119 lb 11.4 oz)   Height: 5' 3.25" (1.607 m)     Physical Exam   Constitutional: She is oriented to person, place, and time. She appears well-developed and well-nourished. No distress.   Well groomed   HENT:   Head: Normocephalic and atraumatic.   Right Ear: External ear normal.   Left Ear: External ear normal.   Nose: Nose normal. Right sinus exhibits no maxillary sinus tenderness and no frontal sinus tenderness. Left sinus exhibits no maxillary sinus tenderness and no frontal sinus tenderness.   Mouth/Throat: Oropharynx is clear and moist and mucous membranes are normal. Normal dentition.   Eyes: Conjunctivae, EOM and lids are normal. Pupils are equal, round, and reactive to light. Lids are everted and swept, no foreign bodies found. No scleral icterus.   Neck: Trachea normal and normal range of motion. Neck supple. No JVD present. No tracheal deviation present. No thyroid mass and no thyromegaly present.   No crepitus   Cardiovascular: Normal rate, regular rhythm, normal heart sounds, intact distal pulses and normal pulses. Exam reveals no gallop and no friction rub.   No murmur heard.  Pulmonary/Chest: Effort normal and breath sounds normal. She has no wheezes. She has no rales. She exhibits no tenderness.   Abdominal: Soft. Normal appearance and bowel sounds are normal. She exhibits no distension and no mass. There is no hepatosplenomegaly. There is no tenderness. There is no rebound and no guarding.   Musculoskeletal: Normal range of motion. She exhibits no edema or tenderness.   Lymphadenopathy:     She has no cervical adenopathy.   Neurological: She is alert and oriented to person, place, and time. No cranial nerve deficit. She exhibits normal muscle tone. Coordination normal.   Skin: Skin is warm, dry and intact. Capillary refill takes less than 2 seconds. No abrasion, no bruising and no rash noted. She is not diaphoretic. No " pallor.   Psychiatric: She has a normal mood and affect. Her behavior is normal. Judgment and thought content normal.       Lab Results   Component Value Date    WBC 6.22 02/12/2019    HGB 12.8 02/12/2019    HCT 39.6 02/12/2019    MCV 96 02/12/2019     02/12/2019     Lab Results   Component Value Date    CREATININE 1.1 02/12/2019    BUN 26 (H) 02/12/2019     02/12/2019    K 3.9 02/12/2019     02/12/2019    CO2 30 (H) 02/12/2019     Lab Results   Component Value Date    ALT 37 02/12/2019    AST 33 02/12/2019    ALKPHOS 103 02/12/2019    BILITOT 0.4 02/12/2019         Assessment:       Karena Young is a 77 y.o. female with hypertrophic obstructive cardiomyopathy, CKD comes in for management of breast cancer.  She was previously followed by Dr. Hu in the hematology/oncology clinic and I am seeing the patient for the first time today in Dr. Hu's absence She has a history of Right sided breast cancer: Estrogen positive, CT and Bom4Zjb negative. pT1b tumor s/p lumpectomy. Previously discussed the reasons for surgical staging in helping to determine whether adjuvant chemotherapy is recommended or not. Also discussed the risks and benefits of adjuvant chemotherapy. In this patient with advanced age, comorbidities and most importantly significant dyspnea on exertion, would not recommend adjuvant chemotherapy. Do feel that the risks of chemotherapy including infection outweigh the benefits in her case. Therefore, we discussed that she does not need to undergo further surgical staging as it would not change our management. As her tumor is relatively small and has hormone receptor positive / Kyz1Dfa negative pattern, she has a good prognosis with adjuvant endocrine therapy for 5 yrs. She was evaluated by [facility] for adjuvant radiation therapy and she was advised against pursuing radiation.   Patient continues to tolerate letrozole well, however, she reports that Zometa gives her hallucinations.   Therefore, we transition to Prolia which she has tolerated very well.  She also continues to tolerate letrozole well.  The patient and family have decided against additional mammograms.  We will have the patient follow up in 6 months with repeat labs.    Stage I pT1b, N0, M0 ER/SC positive, HER-2 negative infiltrating ductal carcinoma of the right breast status post lumpectomy (no sentinel lymph node biopsy due to comorbidities)  --letrozole 2.5 mg daily  --proceed with next dose of Prolia if calcium within normal limits  --follow-up in 6 months with repeat labs and Prolia  --patient and family wish to discontinue surveillance mammograms due to patient's functional status/comorbidities      Osteopenia: Seen on DEXA 3/26/15. She was started on Calcium and Vit D.   --continue Prolia q.6 months  --continue calcium/vitamin-D       Hypertrophic obstructive cardiomyopathy: s/p ETOH ablation 4/2006.   --continue to follow Cardiology     Atrial fibrillation: Had transient Atrial flutter and Afib in 2009 and 2010. Also had VT and was placed on Amiodarone for that with no further Afib. Has ICD/pacemaker.    --Currently on full anticoagulation with Coumadin

## 2019-02-21 ENCOUNTER — OFFICE VISIT (OUTPATIENT)
Dept: OTOLARYNGOLOGY | Facility: CLINIC | Age: 79
End: 2019-02-21
Payer: COMMERCIAL

## 2019-02-21 VITALS — WEIGHT: 121.06 LBS | TEMPERATURE: 97 F | BODY MASS INDEX: 21.27 KG/M2

## 2019-02-21 DIAGNOSIS — H91.93 BILATERAL HEARING LOSS, UNSPECIFIED HEARING LOSS TYPE: ICD-10-CM

## 2019-02-21 DIAGNOSIS — H61.23 BILATERAL IMPACTED CERUMEN: Primary | ICD-10-CM

## 2019-02-21 PROCEDURE — 69210 PR REMOVAL IMPACTED CERUMEN REQUIRING INSTRUMENTATION, UNILATERAL: ICD-10-PCS | Mod: S$GLB,,, | Performed by: ORTHOPAEDIC SURGERY

## 2019-02-21 PROCEDURE — 69210 REMOVE IMPACTED EAR WAX UNI: CPT | Mod: S$GLB,,, | Performed by: ORTHOPAEDIC SURGERY

## 2019-02-21 PROCEDURE — 99204 PR OFFICE/OUTPT VISIT, NEW, LEVL IV, 45-59 MIN: ICD-10-PCS | Mod: 25,S$GLB,, | Performed by: ORTHOPAEDIC SURGERY

## 2019-02-21 PROCEDURE — 99999 PR PBB SHADOW E&M-EST. PATIENT-LVL III: CPT | Mod: PBBFAC,,, | Performed by: ORTHOPAEDIC SURGERY

## 2019-02-21 PROCEDURE — 99204 OFFICE O/P NEW MOD 45 MIN: CPT | Mod: 25,S$GLB,, | Performed by: ORTHOPAEDIC SURGERY

## 2019-02-21 PROCEDURE — 99999 PR PBB SHADOW E&M-EST. PATIENT-LVL III: ICD-10-PCS | Mod: PBBFAC,,, | Performed by: ORTHOPAEDIC SURGERY

## 2019-02-21 PROCEDURE — 1101F PR PT FALLS ASSESS DOC 0-1 FALLS W/OUT INJ PAST YR: ICD-10-PCS | Mod: CPTII,S$GLB,, | Performed by: ORTHOPAEDIC SURGERY

## 2019-02-21 PROCEDURE — 1101F PT FALLS ASSESS-DOCD LE1/YR: CPT | Mod: CPTII,S$GLB,, | Performed by: ORTHOPAEDIC SURGERY

## 2019-02-21 NOTE — PROGRESS NOTES
Subjective:       Patient ID: Karena Young is a 79 y.o. female.    Chief Complaint: Ear Fullness (wax)    Patient is a very pleasant 79-year-old female here to see me today for the 1st time for evaluation of hearing loss.  She has had hearing loss for many years, and has now had hearing aids for the last several years.  She obtained her hearing aids from [facility] in [place], but has now moved to [place].  Currently, she is wearing only her hearing aid in the right ear, as she has been unable to get a good fit in her left ear. She was told that she had extremely small ear canals, and even the smallest stone would not fit in her left ear canal. She denies any ear pain or ear drainage.  Her family members accompanying her today are concerned that she is not getting much benefit from her hearing aids, and want to see if there are any other options available to her.      Review of Systems   Constitutional: Negative for chills, fatigue, fever and unexpected weight change.   HENT: Positive for hearing loss. Negative for congestion, dental problem, ear discharge, ear pain, facial swelling, nosebleeds, postnasal drip, rhinorrhea, sinus pressure, sneezing, sore throat, tinnitus, trouble swallowing and voice change.    Eyes: Negative for redness, itching and visual disturbance.   Respiratory: Negative for cough, choking, shortness of breath and wheezing.    Cardiovascular: Negative for chest pain and palpitations.   Gastrointestinal: Negative for abdominal pain.        No reflux.   Musculoskeletal: Negative for gait problem.   Skin: Negative for rash.   Neurological: Positive for light-headedness. Negative for dizziness and headaches.   Psychiatric/Behavioral: Positive for confusion.       Objective:      Physical Exam   Constitutional: She is oriented to person, place, and time. She appears well-developed and well-nourished. No distress.   HENT:   Head: Normocephalic and atraumatic.   Right Ear: Tympanic  membrane, external ear and ear canal normal. Decreased hearing is noted.   Left Ear: Tympanic membrane, external ear and ear canal normal. Decreased hearing is noted.   Nose: Nose normal. No mucosal edema, rhinorrhea, nasal deformity or septal deviation. No epistaxis. Right sinus exhibits no maxillary sinus tenderness and no frontal sinus tenderness. Left sinus exhibits no maxillary sinus tenderness and no frontal sinus tenderness.   Mouth/Throat: Uvula is midline, oropharynx is clear and moist and mucous membranes are normal. Mucous membranes are not pale and not dry. No dental caries. No oropharyngeal exudate or posterior oropharyngeal erythema.   Bilateral cerumen impactions, relatively small ear canals, but overall seems symmetric   Eyes: Conjunctivae, EOM and lids are normal. Pupils are equal, round, and reactive to light. Right eye exhibits no chemosis. Left eye exhibits no chemosis. Right conjunctiva is not injected. Left conjunctiva is not injected. No scleral icterus. Right eye exhibits normal extraocular motion and no nystagmus. Left eye exhibits normal extraocular motion and no nystagmus.   Neck: Trachea normal and phonation normal. No tracheal tenderness present. No tracheal deviation present. No thyroid mass and no thyromegaly present.   Cardiovascular: Intact distal pulses.   Pulmonary/Chest: Effort normal. No stridor. No respiratory distress.   Abdominal: She exhibits no distension.   Lymphadenopathy:        Head (right side): No submental, no submandibular, no preauricular, no posterior auricular and no occipital adenopathy present.        Head (left side): No submental, no submandibular, no preauricular, no posterior auricular and no occipital adenopathy present.     She has no cervical adenopathy.   Neurological: She is alert and oriented to person, place, and time. No cranial nerve deficit.   Skin: Skin is warm and dry. No rash noted. No erythema.   Psychiatric: She has a normal mood and affect.  Her behavior is normal.       Procedure Note    CHIEF COMPLAINT:  Cerumen Impaction    Description:  The patient was seated in an exam chair.  An ear speculum was placed in the right EAC and was examined under the microscope.  Suction and/or loop curettes were used to remove a large cerumen impaction.  The tympanic membrane was visualized and was normal in appearance.  The procedure was repeated on the left side in a similar fashion.  The TM was intact and normal on this side as well.  The patient tolerated the procedure well.          Assessment:       1. Bilateral impacted cerumen    2. Bilateral hearing loss, unspecified hearing loss type        Plan:       1.   Cerumen impaction:  Removed today without difficulty.  I would recommend the use of a wax softening drop, either over the counter Debrox or mineral oil, on a weekly basis.  I also instructed the patient to avoid Qtips.  2.  Bilateral hearing loss:  I have asked her family to please send me a copy of from his recent audiogram to review.  We discussed that if she has poor speech discrimination scores, she may not find much benefit from hearing aids, though I would still recommend the use.  We also discussed that she may benefit from a custom mold in the left ear if she is unable to tolerate the dome.  She has [employer] hearing aids, could have our audiology department evaluate for fit, and she understands that she would have to pay a fitting fee.  Will call once audiogram is reviewed.

## 2019-02-27 DIAGNOSIS — I95.1 ORTHOSTATIC HYPOTENSION: ICD-10-CM

## 2019-02-27 RX ORDER — LOSARTAN POTASSIUM 25 MG/1
25 TABLET ORAL DAILY
Qty: 90 TABLET | Refills: 3 | Status: SHIPPED | OUTPATIENT
Start: 2019-02-27 | End: 2020-03-03

## 2019-02-27 RX ORDER — LISINOPRIL 10 MG/1
TABLET ORAL
Qty: 30 TABLET | Refills: 0 | OUTPATIENT
Start: 2019-02-27

## 2019-02-27 NOTE — TELEPHONE ENCOUNTER
I changed the lisinopril to losartan 25mg due to recent studies on the lisinopril.  Should be a smooth change.

## 2019-03-04 DIAGNOSIS — I48.20 CHRONIC ATRIAL FIBRILLATION: ICD-10-CM

## 2019-03-04 RX ORDER — AMIODARONE HYDROCHLORIDE 200 MG/1
TABLET ORAL
Qty: 30 TABLET | Refills: 0 | Status: SHIPPED | OUTPATIENT
Start: 2019-03-04 | End: 2019-03-25 | Stop reason: DRUGHIGH

## 2019-03-22 ENCOUNTER — OFFICE VISIT (OUTPATIENT)
Dept: CARDIOLOGY | Facility: CLINIC | Age: 79
End: 2019-03-22
Payer: COMMERCIAL

## 2019-03-22 ENCOUNTER — HOSPITAL ENCOUNTER (OUTPATIENT)
Dept: RADIOLOGY | Facility: HOSPITAL | Age: 79
Discharge: HOME | End: 2019-03-22
Attending: INTERNAL MEDICINE
Payer: COMMERCIAL

## 2019-03-22 ENCOUNTER — CLINICAL SUPPORT (OUTPATIENT)
Dept: CARDIOLOGY | Facility: CLINIC | Age: 79
End: 2019-03-22
Payer: COMMERCIAL

## 2019-03-22 VITALS
HEART RATE: 64 BPM | DIASTOLIC BLOOD PRESSURE: 62 MMHG | HEIGHT: 63 IN | SYSTOLIC BLOOD PRESSURE: 108 MMHG | WEIGHT: 118.63 LBS | BODY MASS INDEX: 21.02 KG/M2

## 2019-03-22 DIAGNOSIS — I10 UNCONTROLLED STAGE 2 HYPERTENSION: ICD-10-CM

## 2019-03-22 DIAGNOSIS — Z79.899 AT RISK FOR AMIODARONE TOXICITY WITH LONG TERM USE: ICD-10-CM

## 2019-03-22 DIAGNOSIS — C50.411 MALIGNANT NEOPLASM OF UPPER-OUTER QUADRANT OF RIGHT BREAST IN FEMALE, ESTROGEN RECEPTOR POSITIVE: ICD-10-CM

## 2019-03-22 DIAGNOSIS — Z91.89 AT RISK FOR AMIODARONE TOXICITY WITH LONG TERM USE: ICD-10-CM

## 2019-03-22 DIAGNOSIS — I95.1 ORTHOSTATIC HYPOTENSION: ICD-10-CM

## 2019-03-22 DIAGNOSIS — Z86.79 S/P CRYOABLATION OF ARRHYTHMIA: ICD-10-CM

## 2019-03-22 DIAGNOSIS — I42.1 HYPERTROPHIC OBSTRUCTIVE CARDIOMYOPATHY: ICD-10-CM

## 2019-03-22 DIAGNOSIS — I48.19 PERSISTENT ATRIAL FIBRILLATION: ICD-10-CM

## 2019-03-22 DIAGNOSIS — Z98.890 S/P CRYOABLATION OF ARRHYTHMIA: ICD-10-CM

## 2019-03-22 DIAGNOSIS — I48.3 TYPICAL ATRIAL FLUTTER: ICD-10-CM

## 2019-03-22 DIAGNOSIS — I47.29 PAROXYSMAL VENTRICULAR TACHYCARDIA: ICD-10-CM

## 2019-03-22 DIAGNOSIS — Z17.0 MALIGNANT NEOPLASM OF UPPER-OUTER QUADRANT OF RIGHT BREAST IN FEMALE, ESTROGEN RECEPTOR POSITIVE: ICD-10-CM

## 2019-03-22 DIAGNOSIS — Z45.02 ENCOUNTER FOR FITTING OR ADJUSTMENT OF IMPLANTABLE CARDIOVERTER-DEFIBRILLATOR (ICD): ICD-10-CM

## 2019-03-22 DIAGNOSIS — I45.10 RBBB: ICD-10-CM

## 2019-03-22 DIAGNOSIS — I10 ESSENTIAL HYPERTENSION: ICD-10-CM

## 2019-03-22 DIAGNOSIS — Z95.810 AUTOMATIC IMPLANTABLE CARDIOVERTER-DEFIBRILLATOR IN SITU: ICD-10-CM

## 2019-03-22 DIAGNOSIS — E78.5 HYPERLIPIDEMIA WITH TARGET LDL LESS THAN 130: Chronic | ICD-10-CM

## 2019-03-22 DIAGNOSIS — Z98.890 HISTORY OF RADIOFREQUENCY ABLATION (RFA) FOR COMPLEX RIGHT ATRIAL ARRHYTHMIA: ICD-10-CM

## 2019-03-22 DIAGNOSIS — I42.1 HOCM (HYPERTROPHIC OBSTRUCTIVE CARDIOMYOPATHY): Primary | ICD-10-CM

## 2019-03-22 PROCEDURE — 93283 ICD PROGRAMMING: ICD-10-PCS | Mod: 26,,, | Performed by: INTERNAL MEDICINE

## 2019-03-22 PROCEDURE — 71046 X-RAY EXAM CHEST 2 VIEWS: CPT | Mod: TC

## 2019-03-22 PROCEDURE — 3074F SYST BP LT 130 MM HG: CPT | Mod: CPTII,S$GLB,, | Performed by: INTERNAL MEDICINE

## 2019-03-22 PROCEDURE — 99999 PR PBB SHADOW E&M-EST. PATIENT-LVL III: ICD-10-PCS | Mod: PBBFAC,,, | Performed by: INTERNAL MEDICINE

## 2019-03-22 PROCEDURE — 93283 PRGRMG EVAL IMPLANTABLE DFB: CPT | Mod: 26,,, | Performed by: INTERNAL MEDICINE

## 2019-03-22 PROCEDURE — 99215 PR OFFICE/OUTPT VISIT, EST, LEVL V, 40-54 MIN: ICD-10-PCS | Mod: S$GLB,,, | Performed by: INTERNAL MEDICINE

## 2019-03-22 PROCEDURE — 99215 OFFICE O/P EST HI 40 MIN: CPT | Mod: S$GLB,,, | Performed by: INTERNAL MEDICINE

## 2019-03-22 PROCEDURE — 99999 PR PBB SHADOW E&M-EST. PATIENT-LVL III: CPT | Mod: PBBFAC,,, | Performed by: INTERNAL MEDICINE

## 2019-03-22 PROCEDURE — 71046 XR CHEST PA AND LATERAL: ICD-10-PCS | Mod: 26,,, | Performed by: RADIOLOGY

## 2019-03-22 PROCEDURE — 71046 X-RAY EXAM CHEST 2 VIEWS: CPT | Mod: 26,,, | Performed by: RADIOLOGY

## 2019-03-22 PROCEDURE — 3074F PR MOST RECENT SYSTOLIC BLOOD PRESSURE < 130 MM HG: ICD-10-PCS | Mod: CPTII,S$GLB,, | Performed by: INTERNAL MEDICINE

## 2019-03-22 PROCEDURE — 1101F PR PT FALLS ASSESS DOC 0-1 FALLS W/OUT INJ PAST YR: ICD-10-PCS | Mod: CPTII,S$GLB,, | Performed by: INTERNAL MEDICINE

## 2019-03-22 PROCEDURE — 3078F DIAST BP <80 MM HG: CPT | Mod: CPTII,S$GLB,, | Performed by: INTERNAL MEDICINE

## 2019-03-22 PROCEDURE — 1101F PT FALLS ASSESS-DOCD LE1/YR: CPT | Mod: CPTII,S$GLB,, | Performed by: INTERNAL MEDICINE

## 2019-03-22 PROCEDURE — 3078F PR MOST RECENT DIASTOLIC BLOOD PRESSURE < 80 MM HG: ICD-10-PCS | Mod: CPTII,S$GLB,, | Performed by: INTERNAL MEDICINE

## 2019-03-22 NOTE — PROGRESS NOTES
Subjective:   Patient ID:  Karena Young is a 79 y.o. female     Chief complaint:Atrial Fibrillation and Pacemaker Check      HPI  Background   79 woman   History of HOCM s/p ETOH ablation 4/06, ventricular tachycardia, ICD, HTN, orthostatic hypotension, and HLP.Also noted to have transient A Flutter and AF in 2009 and 2010. Has had no AF since amiodarone -- She also later had VT and is on amio for that now -- since 2012   ICD initially implanted by Dr Gonzales in 11/04 for syncope with VTNS>>replaced in 6/2008   Had a syncopal spell 2/20/12 -- ICD revealed event at 3:44PM-- 4 sec VTNS, also had NSVT x 7; longest 7 seconds on 1/1/12>>started on amiodarone and most recent ICD interrogation revealed no NSVT.   She has had no further syncope   AF and Aflutter in the past was noted by ICD and ATPed.  Mild CKD -- Cr 1.3 to 1.4 -- stable   CHADS=1 (HTN) on aspirin   DLCO normal 6/5/12, 6/2014 82%, FVC 73% TSH 6/15 was WNL -- 2.5  Has had some [place] dizziness     Had ICD replacement and lead was buried deeper in pocket In August 2013 --   Echo from 6/2014:  >>  1 - Concentric remodeling.   2 - Normal left ventricular systolic function (EF 60-65%).   3 - Normal right ventricular systolic function .   4 - Left ventricular diastolic dysfunction.   5 - Severe left atrial enlargement. BAXTER 42  6 - Mild aortic regurgitation.   7 - Mild mitral regurgitation.   8 - Mild pulmonary hypertension.   9 - Mildly elevated central venous pressure.    ECG today shows AR pacing with RBBB-- 60 bpm-      Update till 12/12/16:  She has been on slo mo for a year or more but stable  Gets HOBBS at less than a block  Sleeps 12 hrs a night and then takes a 2 hr nap  She has lost her  recently last sept and she has moved to her sister's house.   ECG today shows AR pacing with FDAVB and RBBB     Update 10/15/17:  She had two ICD shocks last week -- she was ASxc prior. The shocks were one at a time --  by 3 min. This was  "related to A Flutter  She feels O/W well -- no Fc limitations     Update 2/22/18:  She had EP/RFA in November: >>  Pacing demonstrated bidirectional conduction flow across the cavotricuspid isthmus.  Atrial burst pacing was performed and atrial flutter was induced.  This appeared to be atrial type 1 flutter with counterclockwise activation within the right atrium.    The cycle length was 320 milliseconds.  Unfortunately, entrainment pacing was not doable as the tachycardia would either terminate or transform into get an irregular type 2 atrial flutter, which would often terminate spontaneously.  On the strength of the baseline ECG findings, we decided to proceed with standard radiofrequency ablation of the cavotricuspid isthmus.   She was kept on amio and did well till a couple days ago  She has had multiple shocks two days ago -- 5 total shocks in the VT zone -- EGMs c/w AVNRT vs AT with 1:1 AV conduction and long FDAVB,  msec  Closer review suggests the latter with A falling eventually in the blinding period thus initiating "VT" detection.     I have reviewed the actual image of the ECG tracing obtained today and it shows A flutter with 2:1 block -- still looks like CTI but CW. Has RBBB  With QRSd 124.     Update 8/17/18:  5/21/18 PVI was done:  1.  Successful pulmonary vein isolation using cryoablation.  All four pulmonary veins were isolated in a WACA type manner.  2.  Successful pre- and post-ablative electrophysiologic study with coronary sinus recording.  3.  Successful pre- and post-ablative electrophysiologic and electroanatomic mapping of pulmonary veins and left atrium.  4.  Successful SVC electroanatomical and electrophysiologic mapping.  This appeared to be silent.  5.  Successful cardioversion.  The patient left the lab in normal sinus rhythm.  6.  Successful pre and postop reprogramming of the patient's ICD.     Has had no issues since then   ICD eval w/o any A F etc    Update since then:  She " "continues to do very well and has had no issues the past 6 months   She is off coumadin, on Eliquis and ASA   No HOBBS lately   HER BREAST CANCER IS "CURED"  She was on a trip to [place] this past week   She does not want to do DLCO testing -- was OK in 2014 and she is low risk (no smoking, no asbestos exposure).  ICD is in XC repair    Current Outpatient Medications   Medication Sig    amiodarone (PACERONE) 200 MG Tab TAKE 1 TABLET(200 MG) BY MOUTH EVERY DAY    apixaban 5 mg Tab Take 1 tablet (5 mg total) by mouth 2 (two) times daily.    aspirin 81 mg Tab Take 1 tablet by mouth every evening.     atorvastatin (LIPITOR) 20 MG tablet TAKE 1 TABLET BY MOUTH EVERY DAY (Patient taking differently: TAKE 1 TABLET BY MOUTH HS)    B2/VITS A,C,E/LUT/ZEAXANTH/MIN (ICAPS ORAL) Take by mouth 2 (two) times daily.    calcium citrate-vitamin D3 315-200 mg (CITRACAL+D) 315-200 mg-unit per tablet Take 1 tablet by mouth 2 (two) times daily.      donepezil (ARICEPT) 10 MG tablet TAKE 1 TABLET(10 MG) BY MOUTH EVERY EVENING    letrozole (FEMARA) 2.5 mg Tab TAKE 1 TABLET(2.5 MG) BY MOUTH EVERY DAY    losartan (COZAAR) 25 MG tablet Take 1 tablet (25 mg total) by mouth once daily.    metoprolol tartrate (LOPRESSOR) 100 MG tablet Take 1 tablet (100 mg total) by mouth 2 (two) times daily.     No current facility-administered medications for this visit.      Review of Systems   Constitution: Negative for decreased appetite, weakness, weight gain and weight loss.   HENT: Negative for nosebleeds.    Eyes: Negative for blurred vision and visual disturbance.   Cardiovascular: Negative for chest pain, claudication, cyanosis, dyspnea on exertion, irregular heartbeat, leg swelling, near-syncope, orthopnea, palpitations, paroxysmal nocturnal dyspnea and syncope.   Respiratory: Negative for cough, shortness of breath and wheezing.    Endocrine: Negative for heat intolerance.   Skin: Negative for rash.   Musculoskeletal: Negative for muscle " weakness and myalgias.   Gastrointestinal: Negative for abdominal pain, anorexia, melena, nausea and vomiting.   Genitourinary: Negative for menorrhagia.   Neurological: Negative for excessive daytime sleepiness, dizziness, headaches, loss of balance, seizures and vertigo.   Psychiatric/Behavioral: Negative for altered mental status and depression. The patient is not nervous/anxious.        Objective:   Physical Exam   Constitutional: She is oriented to person, place, and time. She appears well-developed and well-nourished.   Uses canes    HENT:   Head: Normocephalic and atraumatic.   Right Ear: External ear normal.   Left Ear: External ear normal.   Eyes: Conjunctivae are normal. Pupils are equal, round, and reactive to light. Left eye exhibits no discharge. No scleral icterus.   Neck: Normal range of motion. Neck supple. No thyromegaly present.   Cardiovascular: Normal rate, regular rhythm, normal heart sounds and intact distal pulses. Exam reveals no gallop, no S3, no S4, no friction rub, no midsystolic click and no opening snap.   No murmur heard.  Pulses:       Carotid pulses are 2+ on the right side, and 2+ on the left side.       Radial pulses are 2+ on the right side, and 2+ on the left side.        Dorsalis pedis pulses are 2+ on the right side, and 2+ on the left side.        Posterior tibial pulses are 2+ on the right side, and 2+ on the left side.   Pulmonary/Chest: Effort normal and breath sounds normal.   Device pocket is in excellent repair   Abdominal: Soft. She exhibits no distension. There is no hepatomegaly. There is no tenderness. There is no guarding.   Musculoskeletal:        Right ankle: She exhibits no swelling.        Left ankle: She exhibits no swelling.        Right lower leg: She exhibits no swelling.        Left lower leg: She exhibits no swelling.   Neurological: She is alert and oriented to person, place, and time. She has normal strength. No cranial nerve deficit. Gait normal.   Skin:  "Skin is warm, dry and intact. No rash noted. No cyanosis. Nails show no clubbing.   Psychiatric: She has a normal mood and affect. Her speech is normal and behavior is normal. Thought content normal. Cognition and memory are normal.   Nursing note and vitals reviewed.    /62 (BP Location: Left arm, Patient Position: Sitting)   Pulse 64   Ht 5' 3" (1.6 m)   BMI 21.44 kg/m²      Assessment:      1. HOCM (hypertrophic obstructive cardiomyopathy)    2. Typical atrial flutter    3. Persistent atrial fibrillation    4. History of radiofrequency ablation (RFA) for complex right atrial arrhythmia    5. S/P cryoablation of arrhythmia    6. RBBB    7. Encounter for fitting or adjustment of implantable cardioverter-defibrillator (ICD)    8. Uncontrolled stage 2 hypertension    9. Essential hypertension    10. Orthostatic hypotension    11. Hyperlipidemia with target LDL less than 130    12. Paroxysmal ventricular tachycardia    13. Malignant neoplasm of upper-outer quadrant of right breast in female, estrogen receptor positive    14. At risk for amiodarone toxicity with long term use        Plan:    ICD checkin 6 months  RTC with me 1 year  Orders Placed This Encounter   Procedures    X-Ray Chest PA And Lateral     Standing Status:   Future     Standing Expiration Date:   3/22/2020     Order Specific Question:   May the Radiologist modify the order per protocol to meet the clinical needs of the patient?     Answer:   Yes    TSH     Standing Status:   Future     Standing Expiration Date:   5/20/2020    Amiodarone level     Standing Status:   Future     Standing Expiration Date:   5/20/2020     Follow-up in about 6 months (around 9/22/2019), or if symptoms worsen or fail to improve, for ICD check.  Medications Discontinued During This Encounter   Medication Reason    cholecalciferol, vitamin D3, 1,000 unit capsule Patient no longer taking    ondansetron (ZOFRAN) 4 MG tablet Patient no longer taking      "   Medication List with Changes/Refills   Current Medications    AMIODARONE (PACERONE) 200 MG TAB    TAKE 1 TABLET(200 MG) BY MOUTH EVERY DAY    APIXABAN 5 MG TAB    Take 1 tablet (5 mg total) by mouth 2 (two) times daily.    ASPIRIN 81 MG TAB    Take 1 tablet by mouth every evening.     ATORVASTATIN (LIPITOR) 20 MG TABLET    TAKE 1 TABLET BY MOUTH EVERY DAY    B2/VITS A,C,E/LUT/ZEAXANTH/MIN (ICAPS ORAL)    Take by mouth 2 (two) times daily.    CALCIUM CITRATE-VITAMIN D3 315-200 MG (CITRACAL+D) 315-200 MG-UNIT PER TABLET    Take 1 tablet by mouth 2 (two) times daily.      DONEPEZIL (ARICEPT) 10 MG TABLET    TAKE 1 TABLET(10 MG) BY MOUTH EVERY EVENING    LETROZOLE (FEMARA) 2.5 MG TAB    TAKE 1 TABLET(2.5 MG) BY MOUTH EVERY DAY    LOSARTAN (COZAAR) 25 MG TABLET    Take 1 tablet (25 mg total) by mouth once daily.    METOPROLOL TARTRATE (LOPRESSOR) 100 MG TABLET    Take 1 tablet (100 mg total) by mouth 2 (two) times daily.   Discontinued Medications    CHOLECALCIFEROL, VITAMIN D3, 1,000 UNIT CAPSULE    Take 1 capsule (1,000 Units total) by mouth once daily.    ONDANSETRON (ZOFRAN) 4 MG TABLET    Take 1 tablet (4 mg total) by mouth every 8 (eight) hours as needed for Nausea.

## 2019-03-25 ENCOUNTER — TELEPHONE (OUTPATIENT)
Dept: CARDIOLOGY | Facility: CLINIC | Age: 79
End: 2019-03-25

## 2019-03-25 RX ORDER — AMIODARONE HYDROCHLORIDE 200 MG/1
200 TABLET ORAL
COMMUNITY
End: 2020-03-30

## 2019-03-25 NOTE — TELEPHONE ENCOUNTER
Telephoned patient's Daughter and advised to drop Amiodarone to 6 days a week none on Sundays.  Repeated back instructions

## 2019-03-25 NOTE — TELEPHONE ENCOUNTER
----- Message from Jake Hauser MD sent at 3/25/2019 10:30 AM CDT -----  See comments below and call patient to discuss.   Please close encounter when done -- no need to route back to me.  Thanks    She can start skipping Sundays

## 2019-03-26 NOTE — TELEPHONE ENCOUNTER
MD Kristie Crowe, CORWIN             See comments below and call patient to discuss.   Please close encounter when done -- no need to route back to me.   Thanks     No significant change      Recalled daughter to let her know chest x-ray results

## 2019-04-02 DIAGNOSIS — I48.20 CHRONIC ATRIAL FIBRILLATION: ICD-10-CM

## 2019-04-02 RX ORDER — AMIODARONE HYDROCHLORIDE 200 MG/1
TABLET ORAL
Qty: 30 TABLET | Refills: 0 | Status: SHIPPED | OUTPATIENT
Start: 2019-04-02 | End: 2019-04-16 | Stop reason: DRUGHIGH

## 2019-04-04 RX ORDER — ATORVASTATIN CALCIUM 20 MG/1
TABLET, FILM COATED ORAL
Qty: 90 TABLET | Refills: 0 | Status: SHIPPED | OUTPATIENT
Start: 2019-04-04 | End: 2019-07-01 | Stop reason: SDUPTHER

## 2019-04-16 ENCOUNTER — OFFICE VISIT (OUTPATIENT)
Dept: INTERNAL MEDICINE | Facility: CLINIC | Age: 79
End: 2019-04-16
Payer: COMMERCIAL

## 2019-04-16 VITALS
HEIGHT: 63 IN | BODY MASS INDEX: 20.51 KG/M2 | HEART RATE: 64 BPM | TEMPERATURE: 98 F | DIASTOLIC BLOOD PRESSURE: 70 MMHG | WEIGHT: 115.75 LBS | SYSTOLIC BLOOD PRESSURE: 128 MMHG

## 2019-04-16 DIAGNOSIS — N18.30 CKD (CHRONIC KIDNEY DISEASE) STAGE 3, GFR 30-59 ML/MIN: ICD-10-CM

## 2019-04-16 DIAGNOSIS — I10 ESSENTIAL HYPERTENSION: ICD-10-CM

## 2019-04-16 DIAGNOSIS — E03.8 SUBCLINICAL HYPOTHYROIDISM: ICD-10-CM

## 2019-04-16 DIAGNOSIS — E55.9 VITAMIN D DEFICIENCY: ICD-10-CM

## 2019-04-16 DIAGNOSIS — I42.1 HOCM (HYPERTROPHIC OBSTRUCTIVE CARDIOMYOPATHY): Primary | ICD-10-CM

## 2019-04-16 DIAGNOSIS — I48.20 CHRONIC ATRIAL FIBRILLATION: ICD-10-CM

## 2019-04-16 DIAGNOSIS — R41.3 MEMORY LOSS: ICD-10-CM

## 2019-04-16 PROCEDURE — 99999 PR PBB SHADOW E&M-EST. PATIENT-LVL III: CPT | Mod: PBBFAC,,, | Performed by: FAMILY MEDICINE

## 2019-04-16 PROCEDURE — 3078F DIAST BP <80 MM HG: CPT | Mod: CPTII,S$GLB,, | Performed by: FAMILY MEDICINE

## 2019-04-16 PROCEDURE — 1101F PR PT FALLS ASSESS DOC 0-1 FALLS W/OUT INJ PAST YR: ICD-10-PCS | Mod: CPTII,S$GLB,, | Performed by: FAMILY MEDICINE

## 2019-04-16 PROCEDURE — 99214 OFFICE O/P EST MOD 30 MIN: CPT | Mod: S$GLB,,, | Performed by: FAMILY MEDICINE

## 2019-04-16 PROCEDURE — 99999 PR PBB SHADOW E&M-EST. PATIENT-LVL III: ICD-10-PCS | Mod: PBBFAC,,, | Performed by: FAMILY MEDICINE

## 2019-04-16 PROCEDURE — 3074F PR MOST RECENT SYSTOLIC BLOOD PRESSURE < 130 MM HG: ICD-10-PCS | Mod: CPTII,S$GLB,, | Performed by: FAMILY MEDICINE

## 2019-04-16 PROCEDURE — 3078F PR MOST RECENT DIASTOLIC BLOOD PRESSURE < 80 MM HG: ICD-10-PCS | Mod: CPTII,S$GLB,, | Performed by: FAMILY MEDICINE

## 2019-04-16 PROCEDURE — 3074F SYST BP LT 130 MM HG: CPT | Mod: CPTII,S$GLB,, | Performed by: FAMILY MEDICINE

## 2019-04-16 PROCEDURE — 99214 PR OFFICE/OUTPT VISIT, EST, LEVL IV, 30-39 MIN: ICD-10-PCS | Mod: S$GLB,,, | Performed by: FAMILY MEDICINE

## 2019-04-16 PROCEDURE — 1101F PT FALLS ASSESS-DOCD LE1/YR: CPT | Mod: CPTII,S$GLB,, | Performed by: FAMILY MEDICINE

## 2019-04-16 NOTE — PROGRESS NOTES
Subjective:      Patient ID: Karena Young is a 79 y.o. female.    Chief Complaint: Follow-up (6 mo)    HPI  80 yo with A. Fib/flutter on blood thinner, HTN, cardiomyopathy, dementia here for f/u with her daughter.  Lives with her dog Bebe right next door to her daughter.  Pt has sitter Gabriela who comes 4 days a week and gets pt out.  She is writing in a journal daily as well.  Overall, she is doing quite well  Appetite is ok, has to be reminded to eat.   No falls.  No Cp/SOB  Bowels moving good    Past Medical History:   Diagnosis Date    *Atrial fibrillation     *Atrial flutter     Anticoagulant long-term use     Breast cancer     Cardiomyopathy     HOCM (hypertrophic obstructive cardiomyopathy) 9/6/2013    Hyperlipidemia     Hypertension     Macular degeneration     Orthostatic hypotension     Paroxysmal atrial fibrillation 9/12/2012    Ventricular tachycardia      Family History   Problem Relation Age of Onset    Cancer Mother     Heart disease Mother     Heart disease Father     Cancer Brother 60     Past Surgical History:   Procedure Laterality Date    ABLATION N/A 5/14/2018    [facility]    ABLATION N/A 2/26/2018    [facility]    ABLATION N/A 11/21/2017    [facility]    BIOPSY-EXCISIONAL Right 5/5/2015    [facility] OR    BREAST BIOPSY      CARDIAC ELECTROPHYSIOLOGY STUDY AND ABLATION  4/06    CATARACT EXTRACTION      ICD implantation      INSERT / REPLACE / REMOVE PACEMAKER      RADIOFREQUENCY ABLATION      REPLACEMENT, PULSE GENERATOR, CARDIAC PACEMAKER N/A 8/21/2013    [facility] C[facility]    TRANSESOPHAGEAL ECHOCARDIOGRAM (MARIAELENA) N/A 5/14/2018    [facility] C[facility]     Social History     Tobacco Use    Smoking status: Never Smoker    Smokeless  "tobacco: Never Used   Substance Use Topics    Alcohol use: No    Drug use: No       /70 (BP Location: Left arm, Patient Position: Sitting, BP Method: Large (Manual))   Pulse 64   Temp 97.9 °F (36.6 °C) (Oral)   Ht 5' 3" (1.6 m)   Wt 52.5 kg (115 lb 11.9 oz)   BMI 20.50 kg/m²     Review of Systems   Constitutional: Negative for activity change, appetite change, chills, diaphoresis, fatigue, fever and unexpected weight change.   HENT: Negative for ear pain, hearing loss, postnasal drip, rhinorrhea and tinnitus.    Eyes: Negative for visual disturbance.   Respiratory: Negative for cough, shortness of breath and wheezing.    Cardiovascular: Negative for chest pain, palpitations and leg swelling.   Gastrointestinal: Negative for abdominal distention.   Genitourinary: Negative for dysuria, frequency, hematuria and urgency.   Neurological: Negative for weakness and headaches.       Objective:     Physical Exam   Constitutional: She is oriented to person, place, and time. She appears well-developed and well-nourished. No distress.   HENT:   Right Ear: External ear normal.   Left Ear: External ear normal.   Nose: Nose normal.   Mouth/Throat: Oropharynx is clear and moist.   Eyes: Pupils are equal, round, and reactive to light. Conjunctivae are normal.   Neck: Normal range of motion. Neck supple. Carotid bruit is not present.   Cardiovascular: Normal rate, regular rhythm and normal heart sounds.   Pulmonary/Chest: Effort normal and breath sounds normal. No respiratory distress. She has no wheezes. She has no rales.   Abdominal: Soft. Bowel sounds are normal. She exhibits no distension. There is no tenderness. There is no guarding.   Musculoskeletal: She exhibits no edema.   Neurological: She is alert and oriented to person, place, and time. No cranial nerve deficit.   Skin: Skin is warm and dry. No rash noted.   Psychiatric: She has a normal mood and affect. Her behavior is normal. Judgment and thought content " normal.   Nursing note and vitals reviewed.      Lab Results   Component Value Date    WBC 6.22 02/12/2019    HGB 12.8 02/12/2019    HCT 39.6 02/12/2019     02/12/2019    CHOL 144 10/09/2018    TRIG 108 10/09/2018    HDL 54 10/09/2018    ALT 37 02/12/2019    AST 33 02/12/2019     02/12/2019    K 3.9 02/12/2019     02/12/2019    CREATININE 1.1 02/12/2019    BUN 26 (H) 02/12/2019    CO2 30 (H) 02/12/2019    TSH 2.865 03/22/2019    INR 2.4 08/15/2018       Assessment:     1. HOCM (hypertrophic obstructive cardiomyopathy)    2. Chronic atrial fibrillation    3. Essential hypertension    4. CKD (chronic kidney disease) stage 3, GFR 30-59 ml/min    5. Memory loss    6. Subclinical hypothyroidism    7. Vitamin D deficiency         Plan:     HOCM (hypertrophic obstructive cardiomyopathy)    Chronic atrial fibrillation    Essential hypertension  -     CBC auto differential; Future; Expected date: 10/16/2019    CKD (chronic kidney disease) stage 3, GFR 30-59 ml/min  -     Comprehensive metabolic panel; Future; Expected date: 10/16/2019    Memory loss    Subclinical hypothyroidism  -     Lipid panel; Future; Expected date: 10/16/2019  -     TSH; Future; Expected date: 10/16/2019    Vitamin D deficiency  -     Vitamin D; Future; Expected date: 10/16/2019    BP/HR stable  Labs have remained stable  Memory stable  Fall precautions//high risk bleed  Cont with good eating habits/water intake  F/u 6 mos with labs

## 2019-04-23 ENCOUNTER — TELEPHONE (OUTPATIENT)
Dept: ELECTROPHYSIOLOGY | Facility: CLINIC | Age: 79
End: 2019-04-23

## 2019-04-23 ENCOUNTER — PATIENT MESSAGE (OUTPATIENT)
Dept: CARDIOLOGY | Facility: CLINIC | Age: 79
End: 2019-04-23

## 2019-04-23 DIAGNOSIS — I48.20 CHRONIC A-FIB: Primary | ICD-10-CM

## 2019-04-23 NOTE — TELEPHONE ENCOUNTER
Karena Young Freddy M., MD 1 hour ago (11:53 AM)         Dr. Hauser...I tried filling the Eliquis and the pharmacy told me it was denied.  Can you please check on this? At my Mom's last visit we talked about changing the quantity from 30 to 60 because she takes its twice a day.   Thank you   Karena Young     Her pharmacy is [facility] in [place].

## 2019-05-02 DIAGNOSIS — I48.20 CHRONIC ATRIAL FIBRILLATION: ICD-10-CM

## 2019-05-02 RX ORDER — AMIODARONE HYDROCHLORIDE 200 MG/1
TABLET ORAL
Qty: 30 TABLET | Refills: 0 | Status: SHIPPED | OUTPATIENT
Start: 2019-05-02 | End: 2019-06-08 | Stop reason: SDUPTHER

## 2019-05-15 DIAGNOSIS — Z79.811 AROMATASE INHIBITOR USE: ICD-10-CM

## 2019-05-15 DIAGNOSIS — C50.411 MALIGNANT NEOPLASM OF UPPER-OUTER QUADRANT OF RIGHT BREAST IN FEMALE, ESTROGEN RECEPTOR POSITIVE: ICD-10-CM

## 2019-05-15 DIAGNOSIS — Z17.0 MALIGNANT NEOPLASM OF UPPER-OUTER QUADRANT OF RIGHT BREAST IN FEMALE, ESTROGEN RECEPTOR POSITIVE: ICD-10-CM

## 2019-05-16 RX ORDER — LETROZOLE 2.5 MG/1
TABLET, FILM COATED ORAL
Qty: 30 TABLET | Refills: 2 | Status: SHIPPED | OUTPATIENT
Start: 2019-05-16 | End: 2019-08-15 | Stop reason: SDUPTHER

## 2019-05-28 ENCOUNTER — TELEPHONE (OUTPATIENT)
Dept: CARDIOLOGY | Facility: HOSPITAL | Age: 79
End: 2019-05-28

## 2019-05-28 RX ORDER — DONEPEZIL HYDROCHLORIDE 10 MG/1
TABLET, FILM COATED ORAL
Qty: 30 TABLET | Refills: 11 | Status: SHIPPED | OUTPATIENT
Start: 2019-05-28 | End: 2020-05-25

## 2019-05-28 NOTE — TELEPHONE ENCOUNTER
I called patient to send her remote transmission. No answer. I was not able to leave a voice message

## 2019-06-08 DIAGNOSIS — I48.20 CHRONIC ATRIAL FIBRILLATION: ICD-10-CM

## 2019-06-08 RX ORDER — AMIODARONE HYDROCHLORIDE 200 MG/1
TABLET ORAL
Qty: 30 TABLET | Refills: 0 | Status: SHIPPED | OUTPATIENT
Start: 2019-06-08 | End: 2019-07-11 | Stop reason: SDUPTHER

## 2019-07-01 RX ORDER — ATORVASTATIN CALCIUM 20 MG/1
TABLET, FILM COATED ORAL
Qty: 90 TABLET | Refills: 0 | Status: SHIPPED | OUTPATIENT
Start: 2019-07-01 | End: 2019-10-07 | Stop reason: SDUPTHER

## 2019-07-11 DIAGNOSIS — I48.20 CHRONIC ATRIAL FIBRILLATION: ICD-10-CM

## 2019-07-11 RX ORDER — AMIODARONE HYDROCHLORIDE 200 MG/1
TABLET ORAL
Qty: 30 TABLET | Refills: 0 | Status: SHIPPED | OUTPATIENT
Start: 2019-07-11 | End: 2019-08-09 | Stop reason: SDUPTHER

## 2019-08-09 DIAGNOSIS — I48.20 CHRONIC ATRIAL FIBRILLATION: ICD-10-CM

## 2019-08-09 RX ORDER — AMIODARONE HYDROCHLORIDE 200 MG/1
TABLET ORAL
Qty: 30 TABLET | Refills: 0 | Status: SHIPPED | OUTPATIENT
Start: 2019-08-09 | End: 2019-09-09 | Stop reason: SDUPTHER

## 2019-08-15 ENCOUNTER — OFFICE VISIT (OUTPATIENT)
Dept: HEMATOLOGY/ONCOLOGY | Facility: CLINIC | Age: 79
End: 2019-08-15
Payer: COMMERCIAL

## 2019-08-15 ENCOUNTER — INFUSION (OUTPATIENT)
Dept: INFUSION THERAPY | Facility: HOSPITAL | Age: 79
End: 2019-08-15
Attending: INTERNAL MEDICINE
Payer: COMMERCIAL

## 2019-08-15 ENCOUNTER — LAB VISIT (OUTPATIENT)
Dept: LAB | Facility: HOSPITAL | Age: 79
End: 2019-08-15
Payer: COMMERCIAL

## 2019-08-15 VITALS
WEIGHT: 115.94 LBS | BODY MASS INDEX: 20.54 KG/M2 | SYSTOLIC BLOOD PRESSURE: 147 MMHG | OXYGEN SATURATION: 97 % | TEMPERATURE: 97 F | HEART RATE: 70 BPM | DIASTOLIC BLOOD PRESSURE: 73 MMHG | HEIGHT: 63 IN

## 2019-08-15 VITALS — HEIGHT: 62 IN | WEIGHT: 115.94 LBS | BODY MASS INDEX: 21.34 KG/M2

## 2019-08-15 DIAGNOSIS — M85.89 OSTEOPENIA OF MULTIPLE SITES: ICD-10-CM

## 2019-08-15 DIAGNOSIS — C50.411 MALIGNANT NEOPLASM OF UPPER-OUTER QUADRANT OF RIGHT BREAST IN FEMALE, ESTROGEN RECEPTOR POSITIVE: Primary | ICD-10-CM

## 2019-08-15 DIAGNOSIS — Z17.0 MALIGNANT NEOPLASM OF UPPER-OUTER QUADRANT OF RIGHT BREAST IN FEMALE, ESTROGEN RECEPTOR POSITIVE: ICD-10-CM

## 2019-08-15 DIAGNOSIS — M85.80 OSTEOPENIA, UNSPECIFIED LOCATION: Chronic | ICD-10-CM

## 2019-08-15 DIAGNOSIS — C50.411 MALIGNANT NEOPLASM OF UPPER-OUTER QUADRANT OF RIGHT BREAST IN FEMALE, ESTROGEN RECEPTOR POSITIVE: ICD-10-CM

## 2019-08-15 DIAGNOSIS — Z17.0 MALIGNANT NEOPLASM OF UPPER-OUTER QUADRANT OF RIGHT BREAST IN FEMALE, ESTROGEN RECEPTOR POSITIVE: Primary | ICD-10-CM

## 2019-08-15 DIAGNOSIS — Z79.811 AROMATASE INHIBITOR USE: ICD-10-CM

## 2019-08-15 DIAGNOSIS — M85.89 OSTEOPENIA OF MULTIPLE SITES: Chronic | ICD-10-CM

## 2019-08-15 LAB
ALBUMIN SERPL BCP-MCNC: 3.6 G/DL (ref 3.5–5.2)
ALP SERPL-CCNC: 84 U/L (ref 55–135)
ALT SERPL W/O P-5'-P-CCNC: 52 U/L (ref 10–44)
ANION GAP SERPL CALC-SCNC: 8 MMOL/L (ref 8–16)
AST SERPL-CCNC: 41 U/L (ref 10–40)
BASOPHILS # BLD AUTO: 0.03 K/UL (ref 0–0.2)
BASOPHILS NFR BLD: 0.6 % (ref 0–1.9)
BILIRUB SERPL-MCNC: 0.5 MG/DL (ref 0.1–1)
BUN SERPL-MCNC: 22 MG/DL (ref 8–23)
CALCIUM SERPL-MCNC: 9.8 MG/DL (ref 8.7–10.5)
CHLORIDE SERPL-SCNC: 99 MMOL/L (ref 95–110)
CO2 SERPL-SCNC: 31 MMOL/L (ref 23–29)
CREAT SERPL-MCNC: 1.2 MG/DL (ref 0.5–1.4)
DIFFERENTIAL METHOD: ABNORMAL
EOSINOPHIL # BLD AUTO: 0.1 K/UL (ref 0–0.5)
EOSINOPHIL NFR BLD: 1.1 % (ref 0–8)
ERYTHROCYTE [DISTWIDTH] IN BLOOD BY AUTOMATED COUNT: 13.1 % (ref 11.5–14.5)
EST. GFR  (AFRICAN AMERICAN): 50 ML/MIN/1.73 M^2
EST. GFR  (NON AFRICAN AMERICAN): 43 ML/MIN/1.73 M^2
GLUCOSE SERPL-MCNC: 105 MG/DL (ref 70–110)
HCT VFR BLD AUTO: 41.8 % (ref 37–48.5)
HGB BLD-MCNC: 13.7 G/DL (ref 12–16)
IMM GRANULOCYTES # BLD AUTO: 0.01 K/UL (ref 0–0.04)
IMM GRANULOCYTES NFR BLD AUTO: 0.2 % (ref 0–0.5)
LYMPHOCYTES # BLD AUTO: 0.7 K/UL (ref 1–4.8)
LYMPHOCYTES NFR BLD: 12.3 % (ref 18–48)
MCH RBC QN AUTO: 31.5 PG (ref 27–31)
MCHC RBC AUTO-ENTMCNC: 32.8 G/DL (ref 32–36)
MCV RBC AUTO: 96 FL (ref 82–98)
MONOCYTES # BLD AUTO: 0.6 K/UL (ref 0.3–1)
MONOCYTES NFR BLD: 10.8 % (ref 4–15)
NEUTROPHILS # BLD AUTO: 4 K/UL (ref 1.8–7.7)
NEUTROPHILS NFR BLD: 75.2 % (ref 38–73)
NRBC BLD-RTO: 0 /100 WBC
PLATELET # BLD AUTO: 221 K/UL (ref 150–350)
PMV BLD AUTO: 9.7 FL (ref 9.2–12.9)
POTASSIUM SERPL-SCNC: 4.7 MMOL/L (ref 3.5–5.1)
PROT SERPL-MCNC: 6.6 G/DL (ref 6–8.4)
RBC # BLD AUTO: 4.35 M/UL (ref 4–5.4)
SODIUM SERPL-SCNC: 138 MMOL/L (ref 136–145)
WBC # BLD AUTO: 5.28 K/UL (ref 3.9–12.7)

## 2019-08-15 PROCEDURE — 3078F PR MOST RECENT DIASTOLIC BLOOD PRESSURE < 80 MM HG: ICD-10-PCS | Mod: CPTII,S$GLB,, | Performed by: INTERNAL MEDICINE

## 2019-08-15 PROCEDURE — 36415 COLL VENOUS BLD VENIPUNCTURE: CPT

## 2019-08-15 PROCEDURE — 3077F SYST BP >= 140 MM HG: CPT | Mod: CPTII,S$GLB,, | Performed by: INTERNAL MEDICINE

## 2019-08-15 PROCEDURE — 1101F PT FALLS ASSESS-DOCD LE1/YR: CPT | Mod: CPTII,S$GLB,, | Performed by: INTERNAL MEDICINE

## 2019-08-15 PROCEDURE — 63600175 PHARM REV CODE 636 W HCPCS: Mod: JG | Performed by: INTERNAL MEDICINE

## 2019-08-15 PROCEDURE — 99999 PR PBB SHADOW E&M-EST. PATIENT-LVL III: ICD-10-PCS | Mod: PBBFAC,,, | Performed by: INTERNAL MEDICINE

## 2019-08-15 PROCEDURE — 80053 COMPREHEN METABOLIC PANEL: CPT

## 2019-08-15 PROCEDURE — 1101F PR PT FALLS ASSESS DOC 0-1 FALLS W/OUT INJ PAST YR: ICD-10-PCS | Mod: CPTII,S$GLB,, | Performed by: INTERNAL MEDICINE

## 2019-08-15 PROCEDURE — 99214 PR OFFICE/OUTPT VISIT, EST, LEVL IV, 30-39 MIN: ICD-10-PCS | Mod: 25,S$GLB,, | Performed by: INTERNAL MEDICINE

## 2019-08-15 PROCEDURE — 96372 THER/PROPH/DIAG INJ SC/IM: CPT

## 2019-08-15 PROCEDURE — 99214 OFFICE O/P EST MOD 30 MIN: CPT | Mod: 25,S$GLB,, | Performed by: INTERNAL MEDICINE

## 2019-08-15 PROCEDURE — 99999 PR PBB SHADOW E&M-EST. PATIENT-LVL III: CPT | Mod: PBBFAC,,, | Performed by: INTERNAL MEDICINE

## 2019-08-15 PROCEDURE — 3078F DIAST BP <80 MM HG: CPT | Mod: CPTII,S$GLB,, | Performed by: INTERNAL MEDICINE

## 2019-08-15 PROCEDURE — 85025 COMPLETE CBC W/AUTO DIFF WBC: CPT

## 2019-08-15 PROCEDURE — 3077F PR MOST RECENT SYSTOLIC BLOOD PRESSURE >= 140 MM HG: ICD-10-PCS | Mod: CPTII,S$GLB,, | Performed by: INTERNAL MEDICINE

## 2019-08-15 RX ORDER — LETROZOLE 2.5 MG/1
2.5 TABLET, FILM COATED ORAL DAILY
Qty: 30 TABLET | Refills: 5 | Status: SHIPPED | OUTPATIENT
Start: 2019-08-15 | End: 2020-02-28

## 2019-08-15 RX ADMIN — DENOSUMAB 60 MG: 60 INJECTION SUBCUTANEOUS at 12:08

## 2019-08-15 NOTE — DISCHARGE INSTRUCTIONS
FALL PREVENTION   Falls often occur due to slipping, tripping or losing your balance. Here are ways to reduce your risk of falling again.   Was there anything that caused your fall that can be fixed, removed or replaced?   Make your home safe by keeping walkways clear of objects you may trip over.   Use non-slip pads under rugs.   Do not walk in poorly lit areas.   Do not stand on chairs or wobbly ladders.   Use caution when reaching overhead or looking upward. This position can cause a loss of balance.   Be sure your shoes fit properly, have non-slip bottoms and are in good condition.   Be cautious when going up and down stairs, curbs, and when walking on uneven sidewalks.   If your balance is poor, consider using a cane or walker.   If your fall was related to alcohol use, stop or limit alcohol intake.   If your fall was related to use of sleeping medicines, talk to your doctor about this. You may need to reduce your dosage at bedtime if you awaken during the night to go to the bathroom.   To reduce the need for nighttime bathroom trips:   Avoid drinking fluids for several hours before going to bed   Empty your bladder before going to bed   Men can keep a urinal at the bedside   © 5802-3748 [facility], [address] [place] [address], [place] [address]. All rights reserved. This information is not intended as a substitute for professional medical care. Always follow your healthcare professional's instructions.  [facility]  [address] [address]  [phone] phone     [phone] fax  Hours of Operation: Monday- Friday 8:00am- 5:00pm  After hours phone  [phone]  Hematology / Oncology Physicians on call      Dr. Bib Ca      Please call with any concerns regarding your appointment today.

## 2019-08-15 NOTE — PROGRESS NOTES
Subjective:       Patient ID: Karena Young is a 79 y.o. female.    Chief Complaint: Malignant neoplasm of upper-outer quadrant of right breast in female, estrogen receptor positive [C50.411, Z17.0]  HPI: We have an opportunity to see Ms. Karena Young in Hematology Oncology clinic at [facility] on 08/15/2019.  Ms. Karena Young is a 79 y.o. female with hypertrophic obstructive cardiomyopathy, CKD comes in for management of breast cancer.  She was previously followed by Dr. Hu in the hematology/oncology clinic and I am seeing the patient for the first time today in Dr. Hu's absence She has a history of Right sided breast cancer: Estrogen positive, NJ and Rdg1Ufb negative. pT1b tumor s/p lumpectomy. Previously discussed the reasons for surgical staging in helping to determine whether adjuvant chemotherapy is recommended or not. Also discussed the risks and benefits of adjuvant chemotherapy. In this patient with advanced age, comorbidities and most importantly significant dyspnea on exertion, would not recommend adjuvant chemotherapy. Do feel that the risks of chemotherapy including infection outweigh the benefits in her case. Therefore, we discussed that she does not need to undergo further surgical staging as it would not change our management. As her tumor is relatively small and has hormone receptor positive / Ouy8Yev negative pattern, she has a good prognosis with adjuvant endocrine therapy for 5 yrs. She was evaluated by [facility] for adjuvant radiation therapy and she was advised against pursuing radiation.   Patient continues to tolerate letrozole well, however, she reports that Zometa gives her hallucinations.  Therefore, we transition to Prolia which she has tolerated very well.  She also continues to tolerate letrozole well.  The patient and family have decided against additional mammograms.  We will have the patient follow up in 6 months with repeat labs.     Stage I pT1b, N0,  M0 ER/NE positive, HER-2 negative infiltrating ductal carcinoma of the right breast status post lumpectomy (no sentinel lymph node biopsy due to comorbidities)  --letrozole 2.5 mg daily  --proceed with next dose of Prolia if calcium within normal limits  --follow-up in 6 months with repeat labs and Prolia  --patient and family wish to discontinue surveillance mammograms due to patient's functional status/comorbidities        Osteopenia: Seen on DEXA 3/26/15. She was started on Calcium and Vit D.   --continue Prolia q.6 months  --continue calcium/vitamin-D        Hypertrophic obstructive cardiomyopathy: s/p ETOH ablation 4/2006.   --continue to follow Cardiology     Atrial fibrillation: Had transient Atrial flutter and Afib in 2009 and 2010. Also had VT and was placed on Amiodarone for that with no further Afib. Has ICD/pacemaker.    --Currently on full anticoagulation with Coumadin     No history exists.     Past Medical History:   Diagnosis Date    *Atrial fibrillation     *Atrial flutter     Anticoagulant long-term use     Breast cancer     Cardiomyopathy     HOCM (hypertrophic obstructive cardiomyopathy) 9/6/2013    Hyperlipidemia     Hypertension     Macular degeneration     Orthostatic hypotension     Paroxysmal atrial fibrillation 9/12/2012    Ventricular tachycardia      Family History   Problem Relation Age of Onset    Cancer Mother     Heart disease Mother     Heart disease Father     Cancer Brother 60     Social History     Socioeconomic History    Marital status:      Spouse name: marina pinzon    Number of children: 3    Years of education: Not on file    Highest education level: Not on file   Occupational History    Occupation: retired   Social Needs    Financial resource strain: Not on file    Food insecurity:     Worry: Not on file     Inability: Not on file    Transportation needs:     Medical: Not on file     Non-medical: Not on file   Tobacco Use    Smoking status:  Never Smoker    Smokeless tobacco: Never Used   Substance and Sexual Activity    Alcohol use: No    Drug use: No    Sexual activity: Not Currently     Partners: Male   Lifestyle    Physical activity:     Days per week: Not on file     Minutes per session: Not on file    Stress: Not on file   Relationships    Social connections:     Talks on phone: Not on file     Gets together: Not on file     Attends Shinto service: Not on file     Active member of club or organization: Not on file     Attends meetings of clubs or organizations: Not on file     Relationship status: Not on file   Other Topics Concern    Not on file   Social History Narrative    Not on file     Past Surgical History:   Procedure Laterality Date    ABLATION N/A 5/14/2018    [facility] C[facility] LAB    ABLATION N/A 2/26/2018    [facility] C[facility]    ABLATION N/A 11/21/2017    [facility] CATH [facility]    BIOPSY-EXCISIONAL Right 5/5/2015    [facility]    BREAST BIOPSY      CARDIAC ELECTROPHYSIOLOGY STUDY AND ABLATION  4/06    CATARACT EXTRACTION      ICD implantation      INSERT / REPLACE / REMOVE PACEMAKER      RADIOFREQUENCY ABLATION      REPLACEMENT, PULSE GENERATOR, CARDIAC PACEMAKER N/A 8/21/2013    [facility] CATH LAB    TRANSESOPHAGEAL ECHOCARDIOGRAM (MARIAELENA) N/A 5/14/2018    Per[facility] at [facility] CATH LAB     Current Outpatient Medications   Medication Sig Dispense Refill    amiodarone (PACERONE) 200 MG Tab Take 200 mg by mouth. Take one tablet everyday except Sundays so 6 days a week      amiodarone (PACERONE) 200 MG Tab TAKE 1 TABLET(200 MG) BY MOUTH EVERY DAY 30 tablet 0    apixaban (ELIQUIS) 5 mg Tab Take 1 tablet (5 mg total) by mouth 2 (two) times daily. 60 tablet 0    atorvastatin (LIPITOR) 20 MG tablet TAKE 1 TABLET BY MOUTH AT  BEDTIME 90 tablet 0    B2/VITS A,C,E/LUT/ZEAXANTH/MIN (ICAPS ORAL) Take by mouth 2 (two) times daily.      calcium citrate-vitamin D3 315-200 mg (CITRACAL+D) 315-200 mg-unit per tablet Take 1 tablet by mouth 2 (two) times daily.        donepezil (ARICEPT) 10 MG tablet TAKE 1 TABLET(10 MG) BY MOUTH EVERY EVENING 30 tablet 11    letrozole (FEMARA) 2.5 mg Tab TAKE 1 TABLET(2.5 MG) BY MOUTH EVERY DAY 30 tablet 2    losartan (COZAAR) 25 MG tablet Take 1 tablet (25 mg total) by mouth once daily. 90 tablet 3    metoprolol tartrate (LOPRESSOR) 100 MG tablet Take 1 tablet (100 mg total) by mouth 2 (two) times daily. 60 tablet 11     No current facility-administered medications for this visit.        Labs:  Lab Results   Component Value Date    WBC 5.28 08/15/2019    HGB 13.7 08/15/2019    HCT 41.8 08/15/2019    MCV 96 08/15/2019     08/15/2019     BMP  Lab Results   Component Value Date     08/15/2019    K 4.7 08/15/2019    CL 99 08/15/2019    CO2 31 (H) 08/15/2019    BUN 22 08/15/2019    CREATININE 1.2 08/15/2019    CALCIUM 9.8 08/15/2019    ANIONGAP 8 08/15/2019    ESTGFRAFRICA 50 (A) 08/15/2019    EGFRNONAA 43 (A) 08/15/2019     Lab Results   Component Value Date    ALT 52 (H) 08/15/2019    AST 41 (H) 08/15/2019    ALKPHOS 84 08/15/2019    BILITOT 0.5 08/15/2019       No results found for: IRON, TIBC, FERRITIN, SATURATEDIRO  Lab Results   Component Value Date    TIHRNAPH77 256 04/27/2017     No results found for: FOLATE  Lab Results   Component Value Date    TSH 2.865 03/22/2019       I have reviewed the radiology reports and examined the scan/xray images.    Review of Systems   Constitutional: Negative.    HENT: Negative.    Eyes: Negative.    Respiratory: Negative.    Cardiovascular: Negative.    Gastrointestinal: Negative.    Endocrine: Negative.    Genitourinary: Negative.    Musculoskeletal: Negative.    Skin: Negative.    Allergic/Immunologic: Negative.    Neurological: Negative.    Hematological:  Negative.    Psychiatric/Behavioral: Negative.      ECOG SCORE    2 - Capable of all selfcare but unable to carry out any work activities, active > 50% of hours            Objective:     Vitals:    08/15/19 1048   BP: (!) 147/73   Pulse: 70   Temp: 97.4 °F (36.3 °C)   Body mass index is 20.55 kg/m².  Physical Exam   Constitutional: She is oriented to person, place, and time. She appears well-developed and well-nourished.   HENT:   Head: Normocephalic and atraumatic.   Eyes: Conjunctivae and EOM are normal.   Neck: Normal range of motion. Neck supple.   Cardiovascular: Normal rate and regular rhythm.   Pulmonary/Chest: Effort normal and breath sounds normal.   Abdominal: Soft. Bowel sounds are normal.   Musculoskeletal: Normal range of motion.   Neurological: She is alert and oriented to person, place, and time.   Skin: Skin is warm and dry.   Psychiatric: She has a normal mood and affect. Her behavior is normal. Judgment and thought content normal.   Nursing note and vitals reviewed.        Assessment:      1. Malignant neoplasm of upper-outer quadrant of right breast in female, estrogen receptor positive    2. Aromatase inhibitor use    3. Osteopenia, unspecified location           Plan:     Malignant neoplasm of upper-outer quadrant of right breast in female, estrogen receptor positive  -     letrozole (FEMARA) 2.5 mg Tab; Take 1 tablet (2.5 mg total) by mouth once daily.  Dispense: 30 tablet; Refill: 5    Aromatase inhibitor use  -     letrozole (FEMARA) 2.5 mg Tab; Take 1 tablet (2.5 mg total) by mouth once daily.  Dispense: 30 tablet; Refill: 5    Osteopenia, unspecified location  Continue on calcium  vitamin D and prolia  -     Bone Density Spine And Hip; Future; Expected date: 08/15/2019

## 2019-08-31 ENCOUNTER — NURSE TRIAGE (OUTPATIENT)
Dept: ADMINISTRATIVE | Facility: CLINIC | Age: 79
End: 2019-08-31

## 2019-08-31 NOTE — TELEPHONE ENCOUNTER
Daughter wanted to know if there is anything other than tylenol pt can take for arthritis or if there is any other treatment, I advised her tylenol is one of the best things to take for arthritis, advised her to follow up with pt pcp to see if there were any other options they can try to alleviate the pain, caller agreed.

## 2019-09-09 DIAGNOSIS — I48.20 CHRONIC ATRIAL FIBRILLATION: ICD-10-CM

## 2019-09-09 RX ORDER — AMIODARONE HYDROCHLORIDE 200 MG/1
TABLET ORAL
Qty: 30 TABLET | Refills: 0 | Status: SHIPPED | OUTPATIENT
Start: 2019-09-09 | End: 2019-10-11 | Stop reason: SDUPTHER

## 2019-10-07 RX ORDER — ATORVASTATIN CALCIUM 20 MG/1
TABLET, FILM COATED ORAL
Qty: 90 TABLET | Refills: 3 | Status: SHIPPED | OUTPATIENT
Start: 2019-10-07 | End: 2020-10-05

## 2019-10-10 ENCOUNTER — LAB VISIT (OUTPATIENT)
Dept: LAB | Facility: HOSPITAL | Age: 79
End: 2019-10-10
Attending: FAMILY MEDICINE
Payer: COMMERCIAL

## 2019-10-10 DIAGNOSIS — E03.8 SUBCLINICAL HYPOTHYROIDISM: ICD-10-CM

## 2019-10-10 DIAGNOSIS — I10 ESSENTIAL HYPERTENSION: ICD-10-CM

## 2019-10-10 DIAGNOSIS — E55.9 VITAMIN D DEFICIENCY: ICD-10-CM

## 2019-10-10 DIAGNOSIS — N18.30 CKD (CHRONIC KIDNEY DISEASE) STAGE 3, GFR 30-59 ML/MIN: ICD-10-CM

## 2019-10-10 LAB
25(OH)D3+25(OH)D2 SERPL-MCNC: 25 NG/ML (ref 30–96)
ALBUMIN SERPL BCP-MCNC: 4 G/DL (ref 3.5–5.2)
ALP SERPL-CCNC: 80 U/L (ref 55–135)
ALT SERPL W/O P-5'-P-CCNC: 21 U/L (ref 10–44)
ANION GAP SERPL CALC-SCNC: 9 MMOL/L (ref 8–16)
AST SERPL-CCNC: 27 U/L (ref 10–40)
BASOPHILS # BLD AUTO: 0.04 K/UL (ref 0–0.2)
BASOPHILS NFR BLD: 0.8 % (ref 0–1.9)
BILIRUB SERPL-MCNC: 0.6 MG/DL (ref 0.1–1)
BUN SERPL-MCNC: 21 MG/DL (ref 8–23)
CALCIUM SERPL-MCNC: 9.8 MG/DL (ref 8.7–10.5)
CHLORIDE SERPL-SCNC: 99 MMOL/L (ref 95–110)
CHOLEST SERPL-MCNC: 152 MG/DL (ref 120–199)
CHOLEST/HDLC SERPL: 3 {RATIO} (ref 2–5)
CO2 SERPL-SCNC: 32 MMOL/L (ref 23–29)
CREAT SERPL-MCNC: 1.1 MG/DL (ref 0.5–1.4)
DIFFERENTIAL METHOD: ABNORMAL
EOSINOPHIL # BLD AUTO: 0.1 K/UL (ref 0–0.5)
EOSINOPHIL NFR BLD: 1.5 % (ref 0–8)
ERYTHROCYTE [DISTWIDTH] IN BLOOD BY AUTOMATED COUNT: 13.1 % (ref 11.5–14.5)
EST. GFR  (AFRICAN AMERICAN): 55.2 ML/MIN/1.73 M^2
EST. GFR  (NON AFRICAN AMERICAN): 47.9 ML/MIN/1.73 M^2
GLUCOSE SERPL-MCNC: 92 MG/DL (ref 70–110)
HCT VFR BLD AUTO: 46.3 % (ref 37–48.5)
HDLC SERPL-MCNC: 51 MG/DL (ref 40–75)
HDLC SERPL: 33.6 % (ref 20–50)
HGB BLD-MCNC: 14.3 G/DL (ref 12–16)
IMM GRANULOCYTES # BLD AUTO: 0.02 K/UL (ref 0–0.04)
IMM GRANULOCYTES NFR BLD AUTO: 0.4 % (ref 0–0.5)
LDLC SERPL CALC-MCNC: 80 MG/DL (ref 63–159)
LYMPHOCYTES # BLD AUTO: 0.7 K/UL (ref 1–4.8)
LYMPHOCYTES NFR BLD: 12.8 % (ref 18–48)
MCH RBC QN AUTO: 30.4 PG (ref 27–31)
MCHC RBC AUTO-ENTMCNC: 30.9 G/DL (ref 32–36)
MCV RBC AUTO: 99 FL (ref 82–98)
MONOCYTES # BLD AUTO: 0.5 K/UL (ref 0.3–1)
MONOCYTES NFR BLD: 10 % (ref 4–15)
NEUTROPHILS # BLD AUTO: 3.9 K/UL (ref 1.8–7.7)
NEUTROPHILS NFR BLD: 74.5 % (ref 38–73)
NONHDLC SERPL-MCNC: 101 MG/DL
NRBC BLD-RTO: 0 /100 WBC
PLATELET # BLD AUTO: 232 K/UL (ref 150–350)
PMV BLD AUTO: 11.1 FL (ref 9.2–12.9)
POTASSIUM SERPL-SCNC: 4.5 MMOL/L (ref 3.5–5.1)
PROT SERPL-MCNC: 7.2 G/DL (ref 6–8.4)
RBC # BLD AUTO: 4.7 M/UL (ref 4–5.4)
SODIUM SERPL-SCNC: 140 MMOL/L (ref 136–145)
TRIGL SERPL-MCNC: 105 MG/DL (ref 30–150)
TSH SERPL DL<=0.005 MIU/L-ACNC: 2.98 UIU/ML (ref 0.4–4)
WBC # BLD AUTO: 5.22 K/UL (ref 3.9–12.7)

## 2019-10-10 PROCEDURE — 80061 LIPID PANEL: CPT

## 2019-10-10 PROCEDURE — 85025 COMPLETE CBC W/AUTO DIFF WBC: CPT

## 2019-10-10 PROCEDURE — 82306 VITAMIN D 25 HYDROXY: CPT

## 2019-10-10 PROCEDURE — 84443 ASSAY THYROID STIM HORMONE: CPT

## 2019-10-10 PROCEDURE — 36415 COLL VENOUS BLD VENIPUNCTURE: CPT | Mod: PO

## 2019-10-10 PROCEDURE — 80053 COMPREHEN METABOLIC PANEL: CPT

## 2019-10-11 DIAGNOSIS — I48.20 CHRONIC ATRIAL FIBRILLATION: ICD-10-CM

## 2019-10-13 RX ORDER — AMIODARONE HYDROCHLORIDE 200 MG/1
TABLET ORAL
Qty: 30 TABLET | Refills: 0 | Status: SHIPPED | OUTPATIENT
Start: 2019-10-13 | End: 2019-10-17 | Stop reason: SDUPTHER

## 2019-10-17 ENCOUNTER — OFFICE VISIT (OUTPATIENT)
Dept: INTERNAL MEDICINE | Facility: CLINIC | Age: 79
End: 2019-10-17
Payer: MEDICARE

## 2019-10-17 ENCOUNTER — APPOINTMENT (OUTPATIENT)
Dept: RADIOLOGY | Facility: HOSPITAL | Age: 79
End: 2019-10-17
Attending: INTERNAL MEDICINE
Payer: MEDICARE

## 2019-10-17 VITALS
TEMPERATURE: 98 F | HEART RATE: 60 BPM | SYSTOLIC BLOOD PRESSURE: 110 MMHG | HEIGHT: 62 IN | WEIGHT: 119.06 LBS | DIASTOLIC BLOOD PRESSURE: 68 MMHG | BODY MASS INDEX: 21.91 KG/M2

## 2019-10-17 DIAGNOSIS — I48.20 CHRONIC ATRIAL FIBRILLATION: ICD-10-CM

## 2019-10-17 DIAGNOSIS — M85.80 OSTEOPENIA, UNSPECIFIED LOCATION: ICD-10-CM

## 2019-10-17 DIAGNOSIS — E78.5 HYPERLIPIDEMIA, UNSPECIFIED HYPERLIPIDEMIA TYPE: ICD-10-CM

## 2019-10-17 DIAGNOSIS — N18.30 CKD (CHRONIC KIDNEY DISEASE) STAGE 3, GFR 30-59 ML/MIN: ICD-10-CM

## 2019-10-17 DIAGNOSIS — Z00.00 ANNUAL PHYSICAL EXAM: Primary | ICD-10-CM

## 2019-10-17 DIAGNOSIS — I10 ESSENTIAL HYPERTENSION: ICD-10-CM

## 2019-10-17 DIAGNOSIS — I42.1 HOCM (HYPERTROPHIC OBSTRUCTIVE CARDIOMYOPATHY): ICD-10-CM

## 2019-10-17 DIAGNOSIS — F03.90 DEMENTIA WITHOUT BEHAVIORAL DISTURBANCE, UNSPECIFIED DEMENTIA TYPE: ICD-10-CM

## 2019-10-17 PROCEDURE — 99999 PR PBB SHADOW E&M-EST. PATIENT-LVL III: ICD-10-PCS | Mod: PBBFAC,,, | Performed by: FAMILY MEDICINE

## 2019-10-17 PROCEDURE — 3078F DIAST BP <80 MM HG: CPT | Mod: CPTII,S$GLB,, | Performed by: FAMILY MEDICINE

## 2019-10-17 PROCEDURE — 3074F SYST BP LT 130 MM HG: CPT | Mod: CPTII,S$GLB,, | Performed by: FAMILY MEDICINE

## 2019-10-17 PROCEDURE — 77080 DXA BONE DENSITY AXIAL: CPT | Mod: 26,,, | Performed by: RADIOLOGY

## 2019-10-17 PROCEDURE — 3074F PR MOST RECENT SYSTOLIC BLOOD PRESSURE < 130 MM HG: ICD-10-PCS | Mod: CPTII,S$GLB,, | Performed by: FAMILY MEDICINE

## 2019-10-17 PROCEDURE — 99214 PR OFFICE/OUTPT VISIT, EST, LEVL IV, 30-39 MIN: ICD-10-PCS | Mod: S$GLB,,, | Performed by: FAMILY MEDICINE

## 2019-10-17 PROCEDURE — 3078F PR MOST RECENT DIASTOLIC BLOOD PRESSURE < 80 MM HG: ICD-10-PCS | Mod: CPTII,S$GLB,, | Performed by: FAMILY MEDICINE

## 2019-10-17 PROCEDURE — 99999 PR PBB SHADOW E&M-EST. PATIENT-LVL III: CPT | Mod: PBBFAC,,, | Performed by: FAMILY MEDICINE

## 2019-10-17 PROCEDURE — 99214 OFFICE O/P EST MOD 30 MIN: CPT | Mod: S$GLB,,, | Performed by: FAMILY MEDICINE

## 2019-10-17 PROCEDURE — 77080 DEXA BONE DENSITY SPINE HIP: ICD-10-PCS | Mod: 26,,, | Performed by: RADIOLOGY

## 2019-10-17 PROCEDURE — 77080 DXA BONE DENSITY AXIAL: CPT | Mod: TC

## 2019-10-17 NOTE — PROGRESS NOTES
"Subjective:      Patient ID: Karena Young is a 79 y.o. female.    Chief Complaint:  Annual visit    HPI  79 with VITO Dillon on Eliquis, hx of breast CA, cardiomyopathy, HTN here for annual with her daughter.  Memory is getting worse.  She lives in her own place next to her daughter and has some sitters coming during the day to help out.  Daughter knows she is getting closer to needing 24 hour care.  They are discussing this.  Pt reports other than joint pains, she is doing ok.    No falls  Mood is good, no combative behavior.   Occ has some issues with swallowing her pills.  No food or water problems.  Daughter will tend to cut the bigger pills, mostly the supplements in half.    Past Medical History:   Diagnosis Date    *Atrial fibrillation     *Atrial flutter     Anticoagulant long-term use     Breast cancer     Cardiomyopathy     HOCM (hypertrophic obstructive cardiomyopathy) 9/6/2013    Hyperlipidemia     Hypertension     Macular degeneration     Orthostatic hypotension     Paroxysmal atrial fibrillation 9/12/2012    Ventricular tachycardia      Family History   Problem Relation Age of Onset    Cancer Mother     Heart disease Mother     Heart disease Father     Cancer Brother 60     Past Surgical History:   Procedure Laterality Date    BREAST BIOPSY      CARDIAC ELECTROPHYSIOLOGY STUDY AND ABLATION  4/06    CATARACT EXTRACTION      ICD implantation      INSERT / REPLACE / REMOVE PACEMAKER      RADIOFREQUENCY ABLATION       Social History     Tobacco Use    Smoking status: Never Smoker    Smokeless tobacco: Never Used   Substance Use Topics    Alcohol use: No     Frequency: Never     Drinks per session: Patient refused     Binge frequency: Never    Drug use: No       /68   Pulse 60   Temp 97.5 °F (36.4 °C) (Oral)   Ht 5' 2" (1.575 m)   Wt 54 kg (119 lb 0.8 oz)   BMI 21.77 kg/m²     Review of Systems   Constitutional: Negative for activity change and unexpected weight change. "   HENT: Positive for hearing loss, rhinorrhea and trouble swallowing.    Eyes: Negative for discharge and visual disturbance.   Respiratory: Negative for chest tightness and wheezing.    Cardiovascular: Negative for chest pain and palpitations.   Gastrointestinal: Negative for blood in stool, constipation, diarrhea and vomiting.   Endocrine: Negative for polydipsia and polyuria.   Genitourinary: Negative for difficulty urinating, dysuria, hematuria and menstrual problem.   Musculoskeletal: Positive for arthralgias. Negative for joint swelling and neck pain.   Neurological: Negative for weakness and headaches.   Psychiatric/Behavioral: Positive for confusion. Negative for dysphoric mood.       Objective:     Physical Exam   Constitutional: She is oriented to person, place, and time. She appears well-developed and well-nourished. No distress.   HENT:   Nose: Nose normal.   Mouth/Throat: Oropharynx is clear and moist.   Eyes: Pupils are equal, round, and reactive to light. Conjunctivae are normal.   Neck: Normal range of motion. Neck supple. Carotid bruit is not present.   Cardiovascular: Normal rate and regular rhythm.   Murmur heard.  Pulmonary/Chest: Effort normal and breath sounds normal. No respiratory distress. She has no wheezes. She has no rales.   Abdominal: Soft. Bowel sounds are normal. She exhibits no distension. There is no tenderness. There is no guarding.   Musculoskeletal: She exhibits no edema.   Neurological: She is alert and oriented to person, place, and time. No cranial nerve deficit.   Skin: Skin is warm and dry. No rash noted.   Psychiatric: She has a normal mood and affect. Her behavior is normal. Judgment and thought content normal.   Nursing note and vitals reviewed.      Lab Results   Component Value Date    WBC 5.22 10/10/2019    HGB 14.3 10/10/2019    HCT 46.3 10/10/2019     10/10/2019    CHOL 152 10/10/2019    TRIG 105 10/10/2019    HDL 51 10/10/2019    ALT 21 10/10/2019    AST 27  10/10/2019     10/10/2019    K 4.5 10/10/2019    CL 99 10/10/2019    CREATININE 1.1 10/10/2019    BUN 21 10/10/2019    CO2 32 (H) 10/10/2019    TSH 2.981 10/10/2019    INR 2.4 08/15/2018       Assessment:     1. Annual physical exam    2. Chronic atrial fibrillation    3. HOCM (hypertrophic obstructive cardiomyopathy)    4. Essential hypertension    5. Hyperlipidemia, unspecified hyperlipidemia type    6. CKD (chronic kidney disease) stage 3, GFR 30-59 ml/min    7. Dementia without behavioral disturbance, unspecified dementia type         Plan:     Annual physical exam    Chronic atrial fibrillation    HOCM (hypertrophic obstructive cardiomyopathy)    Essential hypertension    Hyperlipidemia, unspecified hyperlipidemia type    CKD (chronic kidney disease) stage 3, GFR 30-59 ml/min    Dementia without behavioral disturbance, unspecified dementia type    Reviewed meds, ok to use some gummy supplements  Labs are stable overall  Ok to use tylenol for joint pains and topical analgesics.    Dementia worsening, more care needed.  Family working on this.  Cont current meds  Fall precautions  F/u 6 mos

## 2019-11-09 DIAGNOSIS — I48.20 CHRONIC ATRIAL FIBRILLATION: ICD-10-CM

## 2019-11-09 RX ORDER — AMIODARONE HYDROCHLORIDE 200 MG/1
TABLET ORAL
Qty: 30 TABLET | Refills: 0 | Status: SHIPPED | OUTPATIENT
Start: 2019-11-09 | End: 2019-12-09 | Stop reason: SDUPTHER

## 2019-11-18 DIAGNOSIS — I48.20 CHRONIC A-FIB: ICD-10-CM

## 2019-11-21 RX ORDER — METOPROLOL TARTRATE 100 MG/1
TABLET ORAL
Qty: 60 TABLET | Refills: 3 | Status: SHIPPED | OUTPATIENT
Start: 2019-11-21 | End: 2020-03-26

## 2019-12-09 DIAGNOSIS — I48.20 CHRONIC ATRIAL FIBRILLATION: ICD-10-CM

## 2019-12-09 RX ORDER — AMIODARONE HYDROCHLORIDE 200 MG/1
TABLET ORAL
Qty: 30 TABLET | Refills: 0 | Status: SHIPPED | OUTPATIENT
Start: 2019-12-09 | End: 2020-02-04

## 2020-01-20 ENCOUNTER — TELEPHONE (OUTPATIENT)
Dept: INTERNAL MEDICINE | Facility: CLINIC | Age: 80
End: 2020-01-20

## 2020-01-20 NOTE — TELEPHONE ENCOUNTER
Spoke with pt's caregiver, she said, pt was seen at [facility] over the weekend. She was told that she was dx with syncope and dehydration. She was advised to drink 8 glasses of water a day and was sent home. Pt is complaining of blurrred vision, excessive burping, SOB and she seems to still be dehydrated. Advised that pt go to ER for eval. She verbalized understanding.

## 2020-01-20 NOTE — TELEPHONE ENCOUNTER
----- Message from Kira Rolle sent at 1/20/2020  9:55 AM CST -----  Contact: [id number]-[phone]  Pt's caregiver would like return call from nurse regarding pt; states pt was seen in urgent care and was diagnosed with syncope and dehydration, would like advice on treatment for patient.  Please call back at [phone]. Md Jeffery

## 2020-01-20 NOTE — TELEPHONE ENCOUNTER
Spoke to nurse/sitter.  She called stating pt went to [facility] and was dx with dehydration. She wanted to know why fluids were not given. Explained to her that I am unware and cannot answer that. I advised her, that if pt did not feel better, then she should be seen. I informed of Urgent Care and also offered to schedule an appt. She stated she would speak to the family and call back if needed.

## 2020-02-04 ENCOUNTER — INFUSION (OUTPATIENT)
Dept: INFUSION THERAPY | Facility: HOSPITAL | Age: 80
End: 2020-02-04
Attending: FAMILY MEDICINE
Payer: MEDICARE

## 2020-02-04 ENCOUNTER — OFFICE VISIT (OUTPATIENT)
Dept: HEMATOLOGY/ONCOLOGY | Facility: CLINIC | Age: 80
End: 2020-02-04
Payer: MEDICARE

## 2020-02-04 VITALS
SYSTOLIC BLOOD PRESSURE: 136 MMHG | RESPIRATION RATE: 18 BRPM | OXYGEN SATURATION: 97 % | HEART RATE: 84 BPM | DIASTOLIC BLOOD PRESSURE: 87 MMHG | TEMPERATURE: 98 F | BODY MASS INDEX: 22.18 KG/M2 | WEIGHT: 121.25 LBS

## 2020-02-04 DIAGNOSIS — C50.411 MALIGNANT NEOPLASM OF UPPER-OUTER QUADRANT OF RIGHT BREAST IN FEMALE, ESTROGEN RECEPTOR POSITIVE: Primary | ICD-10-CM

## 2020-02-04 DIAGNOSIS — Z79.811 AROMATASE INHIBITOR USE: ICD-10-CM

## 2020-02-04 DIAGNOSIS — M85.80 OSTEOPENIA, UNSPECIFIED LOCATION: Chronic | ICD-10-CM

## 2020-02-04 DIAGNOSIS — Z17.0 MALIGNANT NEOPLASM OF UPPER-OUTER QUADRANT OF RIGHT BREAST IN FEMALE, ESTROGEN RECEPTOR POSITIVE: ICD-10-CM

## 2020-02-04 DIAGNOSIS — Z17.0 MALIGNANT NEOPLASM OF UPPER-OUTER QUADRANT OF RIGHT BREAST IN FEMALE, ESTROGEN RECEPTOR POSITIVE: Primary | ICD-10-CM

## 2020-02-04 DIAGNOSIS — C50.411 MALIGNANT NEOPLASM OF UPPER-OUTER QUADRANT OF RIGHT BREAST IN FEMALE, ESTROGEN RECEPTOR POSITIVE: ICD-10-CM

## 2020-02-04 DIAGNOSIS — M85.89 OSTEOPENIA OF MULTIPLE SITES: ICD-10-CM

## 2020-02-04 DIAGNOSIS — E55.9 VITAMIN D DEFICIENCY: Primary | ICD-10-CM

## 2020-02-04 PROCEDURE — 99214 PR OFFICE/OUTPT VISIT, EST, LEVL IV, 30-39 MIN: ICD-10-PCS | Mod: S$GLB,,, | Performed by: NURSE PRACTITIONER

## 2020-02-04 PROCEDURE — 3075F SYST BP GE 130 - 139MM HG: CPT | Mod: CPTII,S$GLB,, | Performed by: NURSE PRACTITIONER

## 2020-02-04 PROCEDURE — 99999 PR PBB SHADOW E&M-EST. PATIENT-LVL III: CPT | Mod: PBBFAC,,, | Performed by: NURSE PRACTITIONER

## 2020-02-04 PROCEDURE — 3288F FALL RISK ASSESSMENT DOCD: CPT | Mod: CPTII,S$GLB,, | Performed by: NURSE PRACTITIONER

## 2020-02-04 PROCEDURE — 3079F PR MOST RECENT DIASTOLIC BLOOD PRESSURE 80-89 MM HG: ICD-10-PCS | Mod: CPTII,S$GLB,, | Performed by: NURSE PRACTITIONER

## 2020-02-04 PROCEDURE — 99999 PR PBB SHADOW E&M-EST. PATIENT-LVL III: ICD-10-PCS | Mod: PBBFAC,,, | Performed by: NURSE PRACTITIONER

## 2020-02-04 PROCEDURE — 3075F PR MOST RECENT SYSTOLIC BLOOD PRESS GE 130-139MM HG: ICD-10-PCS | Mod: CPTII,S$GLB,, | Performed by: NURSE PRACTITIONER

## 2020-02-04 PROCEDURE — 1126F PR PAIN SEVERITY QUANTIFIED, NO PAIN PRESENT: ICD-10-PCS | Mod: S$GLB,,, | Performed by: NURSE PRACTITIONER

## 2020-02-04 PROCEDURE — 3288F PR FALLS RISK ASSESSMENT DOCUMENTED: ICD-10-PCS | Mod: CPTII,S$GLB,, | Performed by: NURSE PRACTITIONER

## 2020-02-04 PROCEDURE — 63600175 PHARM REV CODE 636 W HCPCS: Mod: JG | Performed by: NURSE PRACTITIONER

## 2020-02-04 PROCEDURE — 99214 OFFICE O/P EST MOD 30 MIN: CPT | Mod: S$GLB,,, | Performed by: NURSE PRACTITIONER

## 2020-02-04 PROCEDURE — 1159F MED LIST DOCD IN RCRD: CPT | Mod: S$GLB,,, | Performed by: NURSE PRACTITIONER

## 2020-02-04 PROCEDURE — 96372 THER/PROPH/DIAG INJ SC/IM: CPT

## 2020-02-04 PROCEDURE — 1126F AMNT PAIN NOTED NONE PRSNT: CPT | Mod: S$GLB,,, | Performed by: NURSE PRACTITIONER

## 2020-02-04 PROCEDURE — 3079F DIAST BP 80-89 MM HG: CPT | Mod: CPTII,S$GLB,, | Performed by: NURSE PRACTITIONER

## 2020-02-04 PROCEDURE — 1159F PR MEDICATION LIST DOCUMENTED IN MEDICAL RECORD: ICD-10-PCS | Mod: S$GLB,,, | Performed by: NURSE PRACTITIONER

## 2020-02-04 PROCEDURE — 1100F PR PT FALLS ASSESS DOC 2+ FALLS/FALL W/INJURY/YR: ICD-10-PCS | Mod: CPTII,S$GLB,, | Performed by: NURSE PRACTITIONER

## 2020-02-04 PROCEDURE — 1100F PTFALLS ASSESS-DOCD GE2>/YR: CPT | Mod: CPTII,S$GLB,, | Performed by: NURSE PRACTITIONER

## 2020-02-04 RX ORDER — VIT C/E/ZN/COPPR/LUTEIN/ZEAXAN 250MG-90MG
1000 CAPSULE ORAL DAILY
Qty: 90 CAPSULE | Refills: 3 | Status: SHIPPED | OUTPATIENT
Start: 2020-02-04 | End: 2020-10-15

## 2020-02-04 RX ADMIN — DENOSUMAB 60 MG: 60 INJECTION SUBCUTANEOUS at 04:02

## 2020-02-04 NOTE — PROGRESS NOTES
Subjective:      Patient ID: Karena Young is a 79 y.o. female.    Chief Complaint: Prolia injection; lab discussion; breast cancer surveillance    HPI:  Patient is a 79 year old female with history of right sided ER positive MI negative s/p lumpectomy with significant cardiac history including cardiomyopathy with pacemaker defibrillator, A. Fib/A. Flutter s/p cardiac ablation x 2 followed by Dr Hauser.  Had opted for no chemotherapy or radiation due to poor performance status and significant co morbidities.  Continues on daily Letrozole - (now for 4 years) 2.5 mg PO daily.  Tolerating well.  Taking vitamin D and calcium daily.  Last vitamin D level continues to be low.  Last DEXA scan showed no significant bone loss compared to previous scan which showed osteopenia.  Last scan 10/17/2019.  Does not want continued surveillance with mammogram.    Today she is accompanied by her daughter and primary caregiver.  She is pleasantly confused - baseline.  Ambulates with a walker.  States recent hospitalization to [facility] for dehydration and was given IV fluids.  Denies nausea, vomiting, diarrhea.  Is here today for 6 month Prolia.   Denies any recent dental work or jaw bone issues.   Social History     Socioeconomic History    Marital status:      Spouse name: marina pinzon    Number of children: 3    Years of education: Not on file    Highest education level: Not on file   Occupational History    Occupation: retired   Social Needs    Financial resource strain: Not hard at all    Food insecurity:     Worry: Never true     Inability: Never true    Transportation needs:     Medical: No     Non-medical: No   Tobacco Use    Smoking status: Never Smoker    Smokeless tobacco: Never Used   Substance and Sexual Activity    Alcohol use: No     Frequency: Never     Drinks per session: Patient refused     Binge frequency: Never    Drug use: No    Sexual activity: Not Currently     Partners: Male    Lifestyle    Physical activity:     Days per week: 0 days     Minutes per session: Not on file    Stress: To some extent   Relationships    Social connections:     Talks on phone: More than three times a week     Gets together: More than three times a week     Attends [employer] service: Not on file     Active member of club or organization: No     Attends meetings of clubs or organizations: Never     Relationship status:    Other Topics Concern    Not on file   Social History Narrative    Not on file       Family History   Problem Relation Age of Onset    Cancer Mother     Heart disease Mother     Heart disease Father     Cancer Brother 60       Past Surgical History:   Procedure Laterality Date    BREAST BIOPSY      CARDIAC ELECTROPHYSIOLOGY STUDY AND ABLATION  4/06    CATARACT EXTRACTION      ICD implantation      INSERT / REPLACE / REMOVE PACEMAKER      RADIOFREQUENCY ABLATION         Past Medical History:   Diagnosis Date    *Atrial fibrillation     *Atrial flutter     Anticoagulant long-term use     Breast cancer     Cardiomyopathy     HOCM (hypertrophic obstructive cardiomyopathy) 9/6/2013    Hyperlipidemia     Hypertension     Macular degeneration     Orthostatic hypotension     Paroxysmal atrial fibrillation 9/12/2012    Ventricular tachycardia        Review of Systems   Unable to perform ROS: Dementia          Medication List with Changes/Refills   New Medications    CHOLECALCIFEROL, VITAMIN D3, (VITAMIN D3) 25 MCG (1,000 UNIT) CAPSULE    Take 1 capsule (1,000 Units total) by mouth once daily.   Current Medications    ACETAMINOPHEN (TYLENOL ARTHRITIS ORAL)    Take by mouth.    AMIODARONE (PACERONE) 200 MG TAB    Take 200 mg by mouth. Take one tablet everyday except Sundays so 6 days a week    ANTIOX.MV NO.10/OMEG3S/LUT/IFEANYI (I-CAPS ORAL)    Take 1 capsule by mouth once daily.    APIXABAN (ELIQUIS) 5 MG TAB    Take 1 tablet (5 mg total) by mouth 2 (two) times daily.     ATORVASTATIN (LIPITOR) 20 MG TABLET    TAKE 1 TABLET BY MOUTH AT BEDTIME    B2/VITS A,C,E/LUT/ZEAXANTH/MIN (ICAPS ORAL)    Take by mouth 2 (two) times daily.    CALCIUM CITRATE-VITAMIN D3 315-200 MG (CITRACAL+D) 315-200 MG-UNIT PER TABLET    Take 1 tablet by mouth 2 (two) times daily.      DONEPEZIL (ARICEPT) 10 MG TABLET    TAKE 1 TABLET(10 MG) BY MOUTH EVERY EVENING    LETROZOLE (FEMARA) 2.5 MG TAB    Take 1 tablet (2.5 mg total) by mouth once daily.    LOSARTAN (COZAAR) 25 MG TABLET    Take 1 tablet (25 mg total) by mouth once daily.    METOPROLOL TARTRATE (LOPRESSOR) 100 MG TABLET    TAKE 1 TABLET(100 MG) BY MOUTH TWICE DAILY   Discontinued Medications    AMIODARONE (PACERONE) 200 MG TAB    TAKE 1 TABLET(200 MG) BY MOUTH EVERY DAY        Objective:     Vitals:    02/04/20 1431   BP: 136/87   Pulse: 84   Resp: 18   Temp: 97.7 °F (36.5 °C)       Physical Exam   Constitutional: Vital signs are normal. She appears well-developed. No distress.   HENT:   Head: Normocephalic and atraumatic.   Right Ear: External ear normal.   Left Ear: External ear normal.   Nose: Nose normal.   Eyes: Pupils are equal, round, and reactive to light. EOM are normal. Right eye exhibits no discharge. Left eye exhibits no discharge.   Neck: Normal range of motion.   Cardiovascular: Normal rate. An irregular rhythm present.   Pulmonary/Chest: Effort normal and breath sounds normal. No accessory muscle usage. No apnea, no tachypnea and no bradypnea. No respiratory distress.   Abdominal: Soft. Normal appearance. She exhibits no distension.   Musculoskeletal: Normal range of motion.   Neurological: She is alert. Gait abnormal.   Skin: Skin is warm, dry and intact. No rash noted. She is not diaphoretic.   Psychiatric: Cognition and memory are impaired.   Vitals reviewed.      Assessment:     Problem List Items Addressed This Visit        Oncology    Malignant neoplasm of upper-outer quadrant of right female breast    Relevant Orders    CBC auto  differential    Comprehensive metabolic panel       Orthopedic    Osteopenia (Chronic)    Relevant Orders    CBC auto differential    Comprehensive metabolic panel       Other    Aromatase inhibitor use    Relevant Orders    CBC auto differential    Comprehensive metabolic panel      Other Visit Diagnoses     Vitamin D deficiency    -  Primary    Relevant Medications    cholecalciferol, vitamin D3, (VITAMIN D3) 25 mcg (1,000 unit) capsule        Lab Results   Component Value Date    WBC 7.34 02/04/2020    HGB 14.2 02/04/2020    HCT 43.5 02/04/2020    MCV 95 02/04/2020     02/04/2020     BMP  Lab Results   Component Value Date     02/04/2020    K 4.4 02/04/2020    CL 98 02/04/2020    CO2 30 (H) 02/04/2020    BUN 20 02/04/2020    CREATININE 1.2 02/04/2020    CALCIUM 9.6 02/04/2020    ANIONGAP 10 02/04/2020    ESTGFRAFRICA 50 (A) 02/04/2020    EGFRNONAA 43 (A) 02/04/2020     Lab Results   Component Value Date    ALT 28 02/04/2020    AST 31 02/04/2020    ALKPHOS 86 02/04/2020    BILITOT 0.3 02/04/2020           Plan:   Vitamin D deficiency  -     cholecalciferol, vitamin D3, (VITAMIN D3) 25 mcg (1,000 unit) capsule; Take 1 capsule (1,000 Units total) by mouth once daily.  Dispense: 90 capsule; Refill: 3    Aromatase inhibitor use  -     CBC auto differential; Future; Expected date: 02/04/2020  -     Comprehensive metabolic panel; Future; Expected date: 02/04/2020    Malignant neoplasm of upper-outer quadrant of right breast in female, estrogen receptor positive  -     CBC auto differential; Future; Expected date: 02/04/2020  -     Comprehensive metabolic panel; Future; Expected date: 02/04/2020    Osteopenia, unspecified location  -     CBC auto differential; Future; Expected date: 02/04/2020  -     Comprehensive metabolic panel; Future; Expected date: 02/04/2020    Proceed with Prolia today.  Labs look good.  Will continue current calcium and vitamin D supplementation.  Will add an additional 1,000 IU of  vitamin D3 to daily regimen. Continue Letrozole 2.5 mg PO daily.  Discussed 10 years of treatment vs 5 years with decreased cancer recurrence noted with 10 year treatment.  Repated DEXA scan in 2 years.  Continue Prolia every 6 months.  Follow up in 6 months with CBC, CMP, vitamin D levels prior to appointment.         Thank You,  MARCO A Avelar

## 2020-02-28 DIAGNOSIS — Z79.811 AROMATASE INHIBITOR USE: ICD-10-CM

## 2020-02-28 DIAGNOSIS — C50.411 MALIGNANT NEOPLASM OF UPPER-OUTER QUADRANT OF RIGHT BREAST IN FEMALE, ESTROGEN RECEPTOR POSITIVE: ICD-10-CM

## 2020-02-28 DIAGNOSIS — Z17.0 MALIGNANT NEOPLASM OF UPPER-OUTER QUADRANT OF RIGHT BREAST IN FEMALE, ESTROGEN RECEPTOR POSITIVE: ICD-10-CM

## 2020-02-28 RX ORDER — LETROZOLE 2.5 MG/1
TABLET, FILM COATED ORAL
Qty: 30 TABLET | Refills: 11 | Status: SHIPPED | OUTPATIENT
Start: 2020-02-28 | End: 2020-07-01 | Stop reason: SDUPTHER

## 2020-03-03 RX ORDER — LOSARTAN POTASSIUM 25 MG/1
TABLET ORAL
Qty: 90 TABLET | Refills: 3 | Status: SHIPPED | OUTPATIENT
Start: 2020-03-03 | End: 2020-10-15

## 2020-03-26 RX ORDER — METOPROLOL TARTRATE 100 MG/1
TABLET ORAL
Qty: 60 TABLET | Refills: 3 | Status: SHIPPED | OUTPATIENT
Start: 2020-03-26 | End: 2020-07-22

## 2020-03-30 RX ORDER — AMIODARONE HYDROCHLORIDE 200 MG/1
TABLET ORAL
Qty: 30 TABLET | Refills: 11 | Status: SHIPPED | OUTPATIENT
Start: 2020-03-30 | End: 2020-11-30 | Stop reason: SDUPTHER

## 2020-05-21 ENCOUNTER — PATIENT OUTREACH (OUTPATIENT)
Dept: ADMINISTRATIVE | Facility: OTHER | Age: 80
End: 2020-05-21

## 2020-05-25 ENCOUNTER — OFFICE VISIT (OUTPATIENT)
Dept: CARDIOLOGY | Facility: CLINIC | Age: 80
End: 2020-05-25
Payer: MEDICARE

## 2020-05-25 VITALS
SYSTOLIC BLOOD PRESSURE: 132 MMHG | BODY MASS INDEX: 21.77 KG/M2 | WEIGHT: 119 LBS | HEART RATE: 84 BPM | DIASTOLIC BLOOD PRESSURE: 70 MMHG

## 2020-05-25 DIAGNOSIS — I48.0 PAROXYSMAL ATRIAL FIBRILLATION: Primary | ICD-10-CM

## 2020-05-25 DIAGNOSIS — Z79.899 AT RISK FOR AMIODARONE TOXICITY WITH LONG TERM USE: ICD-10-CM

## 2020-05-25 DIAGNOSIS — Z98.890 HISTORY OF RADIOFREQUENCY ABLATION (RFA) FOR COMPLEX RIGHT ATRIAL ARRHYTHMIA: ICD-10-CM

## 2020-05-25 DIAGNOSIS — I42.1 HOCM (HYPERTROPHIC OBSTRUCTIVE CARDIOMYOPATHY): ICD-10-CM

## 2020-05-25 DIAGNOSIS — I47.29 PAROXYSMAL VENTRICULAR TACHYCARDIA: ICD-10-CM

## 2020-05-25 DIAGNOSIS — Z86.79 STATUS POST RADIOFREQUENCY ABLATION (RFA) OPERATION FOR ARRHYTHMIA: ICD-10-CM

## 2020-05-25 DIAGNOSIS — Z91.89 AT RISK FOR AMIODARONE TOXICITY WITH LONG TERM USE: ICD-10-CM

## 2020-05-25 DIAGNOSIS — Z86.79 S/P CRYOABLATION OF ARRHYTHMIA: ICD-10-CM

## 2020-05-25 DIAGNOSIS — I48.3 TYPICAL ATRIAL FLUTTER: ICD-10-CM

## 2020-05-25 DIAGNOSIS — I48.19 PERSISTENT ATRIAL FIBRILLATION: ICD-10-CM

## 2020-05-25 DIAGNOSIS — I10 ESSENTIAL HYPERTENSION: ICD-10-CM

## 2020-05-25 DIAGNOSIS — Z98.890 S/P CRYOABLATION OF ARRHYTHMIA: ICD-10-CM

## 2020-05-25 DIAGNOSIS — I45.10 RBBB: ICD-10-CM

## 2020-05-25 DIAGNOSIS — Z98.890 STATUS POST RADIOFREQUENCY ABLATION (RFA) OPERATION FOR ARRHYTHMIA: ICD-10-CM

## 2020-05-25 DIAGNOSIS — E78.5 HYPERLIPIDEMIA WITH TARGET LDL LESS THAN 130: Chronic | ICD-10-CM

## 2020-05-25 DIAGNOSIS — Z95.810 IMPLANTABLE CARDIOVERTER-DEFIBRILLATOR (ICD) IN SITU: ICD-10-CM

## 2020-05-25 PROCEDURE — 1100F PTFALLS ASSESS-DOCD GE2>/YR: CPT | Mod: CPTII,,, | Performed by: INTERNAL MEDICINE

## 2020-05-25 PROCEDURE — 3078F DIAST BP <80 MM HG: CPT | Mod: CPTII,,, | Performed by: INTERNAL MEDICINE

## 2020-05-25 PROCEDURE — 3075F SYST BP GE 130 - 139MM HG: CPT | Mod: CPTII,,, | Performed by: INTERNAL MEDICINE

## 2020-05-25 PROCEDURE — 99214 OFFICE O/P EST MOD 30 MIN: CPT | Mod: 95,,, | Performed by: INTERNAL MEDICINE

## 2020-05-25 PROCEDURE — 3288F FALL RISK ASSESSMENT DOCD: CPT | Mod: CPTII,,, | Performed by: INTERNAL MEDICINE

## 2020-05-25 PROCEDURE — 3078F PR MOST RECENT DIASTOLIC BLOOD PRESSURE < 80 MM HG: ICD-10-PCS | Mod: CPTII,,, | Performed by: INTERNAL MEDICINE

## 2020-05-25 PROCEDURE — 3288F PR FALLS RISK ASSESSMENT DOCUMENTED: ICD-10-PCS | Mod: CPTII,,, | Performed by: INTERNAL MEDICINE

## 2020-05-25 PROCEDURE — 99214 PR OFFICE/OUTPT VISIT, EST, LEVL IV, 30-39 MIN: ICD-10-PCS | Mod: 95,,, | Performed by: INTERNAL MEDICINE

## 2020-05-25 PROCEDURE — 1159F PR MEDICATION LIST DOCUMENTED IN MEDICAL RECORD: ICD-10-PCS | Mod: ,,, | Performed by: INTERNAL MEDICINE

## 2020-05-25 PROCEDURE — 1159F MED LIST DOCD IN RCRD: CPT | Mod: ,,, | Performed by: INTERNAL MEDICINE

## 2020-05-25 PROCEDURE — 3075F PR MOST RECENT SYSTOLIC BLOOD PRESS GE 130-139MM HG: ICD-10-PCS | Mod: CPTII,,, | Performed by: INTERNAL MEDICINE

## 2020-05-25 PROCEDURE — 1100F PR PT FALLS ASSESS DOC 2+ FALLS/FALL W/INJURY/YR: ICD-10-PCS | Mod: CPTII,,, | Performed by: INTERNAL MEDICINE

## 2020-05-25 RX ORDER — DONEPEZIL HYDROCHLORIDE 10 MG/1
TABLET, FILM COATED ORAL
Qty: 30 TABLET | Refills: 11 | Status: SHIPPED | OUTPATIENT
Start: 2020-05-25 | End: 2020-10-15

## 2020-05-25 NOTE — PROGRESS NOTES
Subjective:   Patient ID:  Karena Young is a 80 y.o. female     The patient location is:  home  The chief complaint leading to consultation is:  Hypertrophic cardiomyopathy, status post septal ablation, ICD, status post ICD shocks, atrial flutter, status post RFA, atrial fibrillation, status post PVI.    Visit type: audiovisual    Face to Face time with patient:  15 min  Thirty minutes of total time spent on the encounter, which includes face to face time and non-face to face time preparing to see the patient (eg, review of tests), Obtaining and/or reviewing separately obtained history, Documenting clinical information in the electronic or other health record, Independently interpreting results (not separately reported) and communicating results to the patient/family/caregiver, or Care coordination (not separately reported).     Each patient to whom he or she provides medical services by telemedicine is:  (1) informed of the relationship between the physician and patient and the respective role of any other health care provider with respect to management of the patient; and (2) notified that he or she may decline to receive medical services by telemedicine and may withdraw  from such care at any time.    HPI  Background:  History of HOCM s/p ETOH ablation 4/06, ventricular tachycardia, ICD, HTN, orthostatic hypotension, and HLP.Also noted to have transient A Flutter and AF in 2009 and 2010. Has had no AF since amiodarone -- She also later had VT and is on amio for that now -- since 2012   ICD initially implanted by Dr Gonzales in 11/04 for syncope with VTNS>>replaced in 6/2008   Had a syncopal spell 2/20/12 -- ICD revealed event at 3:44PM-- 4 sec VTNS, also had NSVT x 7; longest 7 seconds on 1/1/12>>started on amiodarone and most recent ICD interrogation revealed no NSVT.   She has had no further syncope   AF and Aflutter in the past was noted by ICD and ATPed.  Mild CKD -- Cr 1.3 to 1.4 -- stable    CHADS=1 (HTN) on aspirin   DLCO normal 6/5/12, 6/2014 82%, FVC 73% TSH 6/15 was WNL -- 2.5  Has had some OH dizziness     Had ICD replacement and lead was buried deeper in pocket In August 2013 --   Echo from 6/2014:  >>  1 - Concentric remodeling.   2 - Normal left ventricular systolic function (EF 60-65%).   3 - Normal right ventricular systolic function .   4 - Left ventricular diastolic dysfunction.   5 - Severe left atrial enlargement. BAXTER 42  6 - Mild aortic regurgitation.   7 - Mild mitral regurgitation.   8 - Mild pulmonary hypertension.   9 - Mildly elevated central venous pressure.    ECG today shows AR pacing with RBBB-- 60 bpm-      Update till 12/12/16:  She has been on slo mo for a year or more but stable  Gets HOBBS at less than a block  Sleeps 12 hrs a night and then takes a 2 hr nap  She has lost her  recently last sept and she has moved to her sister's house.   ECG today shows AR pacing with FDAVB and RBBB     Update 10/15/17:  She had two ICD shocks last week -- she was ASxc prior. The shocks were one at a time --  by 3 min. This was related to A Flutter  She feels O/W well -- no Fc limitations     Update 2/22/18:  She had EP/RFA in November: >>  Pacing demonstrated bidirectional conduction flow across the cavotricuspid isthmus.  Atrial burst pacing was performed and atrial flutter was induced.  This appeared to be atrial type 1 flutter with counterclockwise activation within the right atrium.    The cycle length was 320 milliseconds.  Unfortunately, entrainment pacing was not doable as the tachycardia would either terminate or transform into get an irregular type 2 atrial flutter, which would often terminate spontaneously.  On the strength of the baseline ECG findings, we decided to proceed with standard radiofrequency ablation of the cavotricuspid isthmus.   She was kept on amio and did well till a couple days ago  She has had multiple shocks two days ago -- 5 total shocks  "in the VT zone -- EGMs c/w AVNRT vs AT with 1:1 AV conduction and long FDAVB,  msec  Closer review suggests the latter with A falling eventually in the blinding period thus initiating "VT" detection.     I have reviewed the actual image of the ECG tracing obtained today and it shows A flutter with 2:1 block -- still looks like CTI but CW. Has RBBB  With QRSd 124.     Update 8/17/18:  5/21/18 PVI was done:  1.  Successful pulmonary vein isolation using cryoablation.  All four pulmonary veins were isolated in a WACA type manner.  2.  Successful pre- and post-ablative electrophysiologic study with coronary sinus recording.  3.  Successful pre- and post-ablative electrophysiologic and electroanatomic mapping of pulmonary veins and left atrium.  4.  Successful SVC electroanatomical and electrophysiologic mapping.  This appeared to be silent.  5.  Successful cardioversion.  The patient left the lab in normal sinus rhythm.  6.  Successful pre and postop reprogramming of the patient's ICD.     Has had no issues since then   ICD eval w/o any A F etc     Update 3/22/2019:  She continues to do very well and has had no issues the past 6 months   She is off coumadin, on Eliquis and ASA   No HOBBS lately   Her breast cancer is "cured".   She was on a trip to [place] this past week   She does not want to do DLCO testing -- was OK in 2014 and she is low risk (no smoking, no asbestos exposure).  ICD is in XC repair     Update since then:  She has been doing well from a cardiovascular point of view.  She has no chest pain, no palpitations, no unusual shortness of breath, no PND, no orthopnea, no leg edema, no syncope and no presyncope.  She is however becoming more dependent on help due to mental acuity changes and forgetfulness.  She now has a sitter who comes and stays with her during the day, every day.  The visit was done with her and her daughter with her daughter taking the lead.  Last full ICD evaluation was in March " (22nd).  This demonstrated normal function, long battery life and no PAF.  There was intermittent mode switching due to frequent PACs.    Current Outpatient Medications   Medication Sig    acetaminophen (TYLENOL ARTHRITIS ORAL) Take by mouth.    amiodarone (PACERONE) 200 MG Tab TAKE 1 TABLET(200 MG) BY MOUTH EVERY DAY    antiox.mv no.10/omeg3s/lut/maria isabel (I-CAPS ORAL) Take 1 capsule by mouth once daily.    apixaban (ELIQUIS) 5 mg Tab Take 1 tablet (5 mg total) by mouth 2 (two) times daily.    atorvastatin (LIPITOR) 20 MG tablet TAKE 1 TABLET BY MOUTH AT BEDTIME    B2/VITS A,C,E/LUT/ZEAXANTH/MIN (ICAPS ORAL) Take by mouth 2 (two) times daily.    calcium citrate-vitamin D3 315-200 mg (CITRACAL+D) 315-200 mg-unit per tablet Take 1 tablet by mouth 2 (two) times daily.      cholecalciferol, vitamin D3, (VITAMIN D3) 25 mcg (1,000 unit) capsule Take 1 capsule (1,000 Units total) by mouth once daily.    donepezil (ARICEPT) 10 MG tablet TAKE 1 TABLET(10 MG) BY MOUTH EVERY EVENING    letrozole (FEMARA) 2.5 mg Tab TAKE 1 TABLET(2.5 MG) BY MOUTH EVERY DAY    losartan (COZAAR) 25 MG tablet TAKE 1 TABLET(25 MG) BY MOUTH EVERY DAY    metoprolol tartrate (LOPRESSOR) 100 MG tablet TAKE 1 TABLET(100 MG) BY MOUTH TWICE DAILY     No current facility-administered medications for this visit.      ROS  Full CV ROS performed See HPI  Forgetfulness as mentioned above.  Objective:   Physical Exam   /70 sitting, pulse 84 beats per min, weight 119 lb.  Appears to be in NAD,   Color is WNL (no pallor, no facial rashes), no obvious NVC. No obvious neck masses.   No hoarseness and no facial distortions.    No tachypnea.  No labored breathing.  I took a look at the ICD site and it appears to be intact.  Some increase in cutaneous veins around the pocket is noted.  This is chronic.  Mood, affect,  and speech are all WNL. comprehension seems a little bit slower.  Patient denies leg edema.    Assessment:    She has a normal sinus  rhythm, she appears to be doing reasonably well especially from a cardiovascular point of view.  Her amiodarone toxicity screen is due to be updated.  1. Paroxysmal atrial fibrillation    2. Paroxysmal ventricular tachycardia    3. Persistent atrial fibrillation    4. RBBB    5. S/P cryoablation of arrhythmia    6. Typical atrial flutter    7. Status post radiofrequency ablation (RFA) operation for arrhythmia    8. Implantable cardioverter-defibrillator (ICD) in situ    9. Hyperlipidemia with target LDL less than 130    10. HOCM (hypertrophic obstructive cardiomyopathy)    11. Essential hypertension    12. History of radiofrequency ablation (RFA) for complex right atrial arrhythmia    13. At risk for amiodarone toxicity with long term use        Plan:    She needs a TSH and an amiodarone level.  These could be obtained with the next blood draw.  She also needs PFT with DLCO.  No orders of the defined types were placed in this encounter.    Follow up in about 6 months (around 11/25/2020), or if symptoms worsen or fail to improve, for ICD in clinic check.  There are no discontinued medications.     Medication List with Changes/Refills   Current Medications    ACETAMINOPHEN (TYLENOL ARTHRITIS ORAL)    Take by mouth.    AMIODARONE (PACERONE) 200 MG TAB    TAKE 1 TABLET(200 MG) BY MOUTH EVERY DAY    ANTIOX.MV NO.10/OMEG3S/LUT/IFEANYI (I-CAPS ORAL)    Take 1 capsule by mouth once daily.    APIXABAN (ELIQUIS) 5 MG TAB    Take 1 tablet (5 mg total) by mouth 2 (two) times daily.    ATORVASTATIN (LIPITOR) 20 MG TABLET    TAKE 1 TABLET BY MOUTH AT BEDTIME    B2/VITS A,C,E/LUT/ZEAXANTH/MIN (ICAPS ORAL)    Take by mouth 2 (two) times daily.    CALCIUM CITRATE-VITAMIN D3 315-200 MG (CITRACAL+D) 315-200 MG-UNIT PER TABLET    Take 1 tablet by mouth 2 (two) times daily.      CHOLECALCIFEROL, VITAMIN D3, (VITAMIN D3) 25 MCG (1,000 UNIT) CAPSULE    Take 1 capsule (1,000 Units total) by mouth once daily.    DONEPEZIL (ARICEPT) 10 MG TABLET     TAKE 1 TABLET(10 MG) BY MOUTH EVERY EVENING    LETROZOLE (FEMARA) 2.5 MG TAB    TAKE 1 TABLET(2.5 MG) BY MOUTH EVERY DAY    LOSARTAN (COZAAR) 25 MG TABLET    TAKE 1 TABLET(25 MG) BY MOUTH EVERY DAY    METOPROLOL TARTRATE (LOPRESSOR) 100 MG TABLET    TAKE 1 TABLET(100 MG) BY MOUTH TWICE DAILY

## 2020-06-15 ENCOUNTER — HOSPITAL ENCOUNTER (OUTPATIENT)
Dept: RADIOLOGY | Facility: HOSPITAL | Age: 80
Discharge: HOME | End: 2020-06-15
Attending: FAMILY MEDICINE
Payer: MEDICARE

## 2020-06-15 ENCOUNTER — OFFICE VISIT (OUTPATIENT)
Dept: INTERNAL MEDICINE | Facility: CLINIC | Age: 80
End: 2020-06-15
Payer: MEDICARE

## 2020-06-15 VITALS
TEMPERATURE: 98 F | HEART RATE: 62 BPM | BODY MASS INDEX: 22.76 KG/M2 | HEIGHT: 62 IN | DIASTOLIC BLOOD PRESSURE: 70 MMHG | WEIGHT: 123.69 LBS | SYSTOLIC BLOOD PRESSURE: 124 MMHG

## 2020-06-15 DIAGNOSIS — I48.20 CHRONIC ATRIAL FIBRILLATION: ICD-10-CM

## 2020-06-15 DIAGNOSIS — Z02.2 ENCOUNTER FOR EXAMINATION FOR ADMISSION TO NURSING HOME: Primary | ICD-10-CM

## 2020-06-15 DIAGNOSIS — N18.30 CKD (CHRONIC KIDNEY DISEASE) STAGE 3, GFR 30-59 ML/MIN: ICD-10-CM

## 2020-06-15 DIAGNOSIS — I42.1 HOCM (HYPERTROPHIC OBSTRUCTIVE CARDIOMYOPATHY): ICD-10-CM

## 2020-06-15 DIAGNOSIS — Z02.2 ENCOUNTER FOR EXAMINATION FOR ADMISSION TO NURSING HOME: ICD-10-CM

## 2020-06-15 DIAGNOSIS — F03.90 DEMENTIA WITHOUT BEHAVIORAL DISTURBANCE, UNSPECIFIED DEMENTIA TYPE: ICD-10-CM

## 2020-06-15 PROCEDURE — 99214 OFFICE O/P EST MOD 30 MIN: CPT | Mod: S$GLB,,, | Performed by: FAMILY MEDICINE

## 2020-06-15 PROCEDURE — 71046 X-RAY EXAM CHEST 2 VIEWS: CPT | Mod: TC,FY,PO

## 2020-06-15 PROCEDURE — 1159F PR MEDICATION LIST DOCUMENTED IN MEDICAL RECORD: ICD-10-PCS | Mod: S$GLB,,, | Performed by: FAMILY MEDICINE

## 2020-06-15 PROCEDURE — 71046 X-RAY EXAM CHEST 2 VIEWS: CPT | Mod: 26,,, | Performed by: RADIOLOGY

## 2020-06-15 PROCEDURE — 71046 XR CHEST PA AND LATERAL: ICD-10-PCS | Mod: 26,,, | Performed by: RADIOLOGY

## 2020-06-15 PROCEDURE — 99999 PR PBB SHADOW E&M-EST. PATIENT-LVL IV: CPT | Mod: PBBFAC,,, | Performed by: FAMILY MEDICINE

## 2020-06-15 PROCEDURE — 3078F DIAST BP <80 MM HG: CPT | Mod: CPTII,S$GLB,, | Performed by: FAMILY MEDICINE

## 2020-06-15 PROCEDURE — 1101F PT FALLS ASSESS-DOCD LE1/YR: CPT | Mod: CPTII,S$GLB,, | Performed by: FAMILY MEDICINE

## 2020-06-15 PROCEDURE — 1126F PR PAIN SEVERITY QUANTIFIED, NO PAIN PRESENT: ICD-10-PCS | Mod: S$GLB,,, | Performed by: FAMILY MEDICINE

## 2020-06-15 PROCEDURE — 3078F PR MOST RECENT DIASTOLIC BLOOD PRESSURE < 80 MM HG: ICD-10-PCS | Mod: CPTII,S$GLB,, | Performed by: FAMILY MEDICINE

## 2020-06-15 PROCEDURE — 99999 PR PBB SHADOW E&M-EST. PATIENT-LVL IV: ICD-10-PCS | Mod: PBBFAC,,, | Performed by: FAMILY MEDICINE

## 2020-06-15 PROCEDURE — 1126F AMNT PAIN NOTED NONE PRSNT: CPT | Mod: S$GLB,,, | Performed by: FAMILY MEDICINE

## 2020-06-15 PROCEDURE — 3074F SYST BP LT 130 MM HG: CPT | Mod: CPTII,S$GLB,, | Performed by: FAMILY MEDICINE

## 2020-06-15 PROCEDURE — 3074F PR MOST RECENT SYSTOLIC BLOOD PRESSURE < 130 MM HG: ICD-10-PCS | Mod: CPTII,S$GLB,, | Performed by: FAMILY MEDICINE

## 2020-06-15 PROCEDURE — 1101F PR PT FALLS ASSESS DOC 0-1 FALLS W/OUT INJ PAST YR: ICD-10-PCS | Mod: CPTII,S$GLB,, | Performed by: FAMILY MEDICINE

## 2020-06-15 PROCEDURE — 1159F MED LIST DOCD IN RCRD: CPT | Mod: S$GLB,,, | Performed by: FAMILY MEDICINE

## 2020-06-15 PROCEDURE — 99214 PR OFFICE/OUTPT VISIT, EST, LEVL IV, 30-39 MIN: ICD-10-PCS | Mod: S$GLB,,, | Performed by: FAMILY MEDICINE

## 2020-06-15 NOTE — PROGRESS NOTES
"Subjective:      Patient ID: Karena Young is a 80 y.o. female.    Chief Complaint:  F/U chronic conditions    HPI  79 yo with dementia, A. Fib on eliquis, hx of Breat CA, cardiomyopathy, HTN, V. Tach with pacemaker in place.    She is followed by Cards.  She has had a few falls and her memory is getting worse.  Her family is going to put her in a Alzheimer's/Dementia unit in [place] near them.  Pt is aware/not understanding other than she knows her dog can't come.  Has a scrape on her R arm from the fall but otherwise no other issues.  Has issues with OA/joint pains.   Using tylenol arthritis 2 pills bid.  Using her walker.  Requires assistance with all ADLs at this point.    Past Medical History:   Diagnosis Date    *Atrial fibrillation     *Atrial flutter     Anticoagulant long-term use     Breast cancer     Cardiomyopathy     HOCM (hypertrophic obstructive cardiomyopathy) 9/6/2013    Hyperlipidemia     Hypertension     Macular degeneration     Orthostatic hypotension     Paroxysmal atrial fibrillation 9/12/2012    Ventricular tachycardia      Family History   Problem Relation Age of Onset    Cancer Mother     Heart disease Mother     Heart disease Father     Cancer Brother 60     Past Surgical History:   Procedure Laterality Date    BREAST BIOPSY      CARDIAC ELECTROPHYSIOLOGY STUDY AND ABLATION  4/06    CATARACT EXTRACTION      ICD implantation      INSERT / REPLACE / REMOVE PACEMAKER      RADIOFREQUENCY ABLATION       Social History     Tobacco Use    Smoking status: Never Smoker    Smokeless tobacco: Never Used   Substance Use Topics    Alcohol use: No     Frequency: Never     Drinks per session: Patient refused     Binge frequency: Never    Drug use: No       /70   Pulse 62   Temp 98.3 °F (36.8 °C) (Temporal)   Ht 5' 2" (1.575 m)   Wt 56.1 kg (123 lb 10.9 oz)   BMI 22.62 kg/m²     Review of Systems   Constitutional: Negative for activity change and unexpected weight " change.   HENT: Positive for hearing loss, rhinorrhea and trouble swallowing.    Eyes: Negative for discharge and visual disturbance.   Respiratory: Negative for chest tightness and wheezing.    Cardiovascular: Negative for chest pain and palpitations.   Gastrointestinal: Negative for blood in stool, constipation, diarrhea and vomiting.   Endocrine: Negative for polydipsia and polyuria.   Genitourinary: Negative for difficulty urinating, dysuria, hematuria and menstrual problem.   Musculoskeletal: Positive for arthralgias. Negative for joint swelling.   Neurological: Negative for weakness and headaches.   Psychiatric/Behavioral: Positive for confusion. Negative for dysphoric mood.       Objective:     Physical Exam  Vitals signs and nursing note reviewed.   Constitutional:       General: She is not in acute distress.     Appearance: She is well-developed.   Eyes:      Conjunctiva/sclera: Conjunctivae normal.      Pupils: Pupils are equal, round, and reactive to light.   Neck:      Musculoskeletal: Normal range of motion and neck supple.      Thyroid: No thyromegaly.   Cardiovascular:      Rate and Rhythm: Normal rate. Rhythm irregular.      Heart sounds: No murmur. No gallop.    Pulmonary:      Effort: Pulmonary effort is normal. No respiratory distress.      Breath sounds: Normal breath sounds. No wheezing or rales.   Abdominal:      General: Bowel sounds are normal. There is no distension.      Palpations: Abdomen is soft.      Tenderness: There is no abdominal tenderness. There is no guarding.   Skin:     General: Skin is warm and dry.      Findings: No rash.   Neurological:      Mental Status: She is alert.      Cranial Nerves: No cranial nerve deficit.   Psychiatric:         Behavior: Behavior normal.         Thought Content: Thought content normal.         Judgment: Judgment normal.         Lab Results   Component Value Date    WBC 7.34 02/04/2020    HGB 14.2 02/04/2020    HCT 43.5 02/04/2020      02/04/2020    CHOL 152 10/10/2019    TRIG 105 10/10/2019    HDL 51 10/10/2019    ALT 28 02/04/2020    AST 31 02/04/2020     02/04/2020    K 4.4 02/04/2020    CL 98 02/04/2020    CREATININE 1.2 02/04/2020    BUN 20 02/04/2020    CO2 30 (H) 02/04/2020    TSH 2.981 10/10/2019    INR 2.4 08/15/2018       Assessment:     1. Encounter for examination for admission to nursing home    2. Dementia without behavioral disturbance, unspecified dementia type    3. Chronic atrial fibrillation    4. CKD (chronic kidney disease) stage 3, GFR 30-59 ml/min    5. HOCM (hypertrophic obstructive cardiomyopathy)         Plan:     Encounter for examination for admission to nursing home  -     X-Ray Chest PA And Lateral; Future; Expected date: 06/15/2020    Dementia without behavioral disturbance, unspecified dementia type    Chronic atrial fibrillation    CKD (chronic kidney disease) stage 3, GFR 30-59 ml/min    HOCM (hypertrophic obstructive cardiomyopathy)    forms for placement filled out  Pt is up to date on vaccines  CXR today  Labs today for Cards  F/u with me 4-6 mos

## 2020-06-19 ENCOUNTER — TELEPHONE (OUTPATIENT)
Dept: CARDIOLOGY | Facility: CLINIC | Age: 80
End: 2020-06-19

## 2020-06-19 NOTE — TELEPHONE ENCOUNTER
Telephoned patient results and accepted well.    ----- Message from Jake Hauser MD sent at 6/16/2020  4:05 PM CDT -----  Reviewed results. All OK. Please open telephone encounter, call patient and inform him/ her of results,then document in encounter.  Please do not route back to me.   Thanks.

## 2020-06-19 NOTE — TELEPHONE ENCOUNTER
Telephoned patient to advise take amiodarone 6 days a week and skip Sundays and she stated that's what I 've been doing since last visit March 2019  Please advise if need to decrease to 5 days a week        ----- Message from Jake Hauser MD sent at 6/19/2020 12:01 PM CDT -----  See comments below and call patient to discuss.   Please close encounter when done -- no need to route back to me.  Thanks    We can have her skip every Sunday her dose of amiodarone

## 2020-06-29 ENCOUNTER — TELEPHONE (OUTPATIENT)
Dept: INTERNAL MEDICINE | Facility: CLINIC | Age: 80
End: 2020-06-29

## 2020-06-29 DIAGNOSIS — F03.90 DEMENTIA WITHOUT BEHAVIORAL DISTURBANCE, UNSPECIFIED DEMENTIA TYPE: Primary | ICD-10-CM

## 2020-06-29 DIAGNOSIS — R29.6 FREQUENT FALLS: ICD-10-CM

## 2020-06-29 NOTE — TELEPHONE ENCOUNTER
Called [facility] and she said that pt did not meet requirement for hospice.  They said they can do pallaitive care.  She had a information visit today for this.  Since she is Medicare they will send a form to fill out every 3 months.  Need orders

## 2020-06-29 NOTE — TELEPHONE ENCOUNTER
----- Message from Corina Le sent at 6/29/2020 12:31 PM CDT -----  Contact: home health - Ms Alicia  Stated she will like a call back about pt orders for plative care order, she can be reached at [phone]

## 2020-07-01 DIAGNOSIS — C50.411 MALIGNANT NEOPLASM OF UPPER-OUTER QUADRANT OF RIGHT BREAST IN FEMALE, ESTROGEN RECEPTOR POSITIVE: ICD-10-CM

## 2020-07-01 DIAGNOSIS — Z17.0 MALIGNANT NEOPLASM OF UPPER-OUTER QUADRANT OF RIGHT BREAST IN FEMALE, ESTROGEN RECEPTOR POSITIVE: ICD-10-CM

## 2020-07-01 DIAGNOSIS — Z79.811 AROMATASE INHIBITOR USE: ICD-10-CM

## 2020-07-01 RX ORDER — LETROZOLE 2.5 MG/1
TABLET, FILM COATED ORAL
Qty: 30 TABLET | Refills: 11 | Status: CANCELLED | OUTPATIENT
Start: 2020-07-01

## 2020-07-01 RX ORDER — LETROZOLE 2.5 MG/1
TABLET, FILM COATED ORAL
Qty: 30 TABLET | Refills: 11 | Status: SHIPPED | OUTPATIENT
Start: 2020-07-01 | End: 2020-10-16 | Stop reason: SDUPTHER

## 2020-07-02 ENCOUNTER — OFFICE VISIT (OUTPATIENT)
Dept: PALLIATIVE MEDICINE | Facility: CLINIC | Age: 80
End: 2020-07-02
Payer: MEDICARE

## 2020-07-02 DIAGNOSIS — F03.90 DEMENTIA WITHOUT BEHAVIORAL DISTURBANCE, UNSPECIFIED DEMENTIA TYPE: ICD-10-CM

## 2020-07-02 PROCEDURE — 1159F PR MEDICATION LIST DOCUMENTED IN MEDICAL RECORD: ICD-10-PCS | Mod: ,,, | Performed by: FAMILY MEDICINE

## 2020-07-02 PROCEDURE — 1101F PT FALLS ASSESS-DOCD LE1/YR: CPT | Mod: CPTII,,, | Performed by: FAMILY MEDICINE

## 2020-07-02 PROCEDURE — 1159F MED LIST DOCD IN RCRD: CPT | Mod: ,,, | Performed by: FAMILY MEDICINE

## 2020-07-02 PROCEDURE — 1101F PR PT FALLS ASSESS DOC 0-1 FALLS W/OUT INJ PAST YR: ICD-10-PCS | Mod: CPTII,,, | Performed by: FAMILY MEDICINE

## 2020-07-02 PROCEDURE — 99213 PR OFFICE/OUTPT VISIT, EST, LEVL III, 20-29 MIN: ICD-10-PCS | Mod: 95,,, | Performed by: FAMILY MEDICINE

## 2020-07-02 PROCEDURE — 99213 OFFICE O/P EST LOW 20 MIN: CPT | Mod: 95,,, | Performed by: FAMILY MEDICINE

## 2020-07-02 NOTE — PROGRESS NOTES
Subjective:       Patient ID: Karena Young is a 80 y.o. female.    Chief Complaint: initial palliative med consult    The patient location is: [place]  The chief complaint leading to consultation is: initial palliative consult  Visit type: audiovisual    Face to Face time with patient: 25  35 minutes of total time spent on the encounter, which includes face to face time and non-face to face time preparing to see the patient (eg, review of tests), Obtaining and/or reviewing separately obtained history, Documenting clinical information in the electronic or other health record, Independently interpreting results (not separately reported) and communicating results to the patient/family/caregiver, or Care coordination (not separately reported).         Each patient to whom he or she provides medical services by telemedicine is:  (1) informed of the relationship between the physician and patient and the respective role of any other health care provider with respect to management of the patient; and (2) notified that he or she may decline to receive medical services by telemedicine and may withdraw from such care at any time.    Notes: Elderly female with dementia; pleasantly confused, seen today along with her daughter. She is currently living in her own home, cared for by sitters. She has been seen by hospice but did not qualify for hospice care at this time.Ambulates with walker, short distances. She has shortness of breath with exertion. Does not wear oxygen.     Advance Care Planning   Her daughter is TOY Camargo. She would like to complete a LaPost for her mom with goals of comfort care only. We will mail out a LaPost with comfort focused goals of care for her to complete.           does not have any pertinent problems on file.  Past Medical History:   Diagnosis Date    *Atrial fibrillation     *Atrial flutter     Anticoagulant long-term use     Breast cancer     Cardiomyopathy     HOCM (hypertrophic  obstructive cardiomyopathy) 9/6/2013    Hyperlipidemia     Hypertension     Macular degeneration     Orthostatic hypotension     Paroxysmal atrial fibrillation 9/12/2012    Ventricular tachycardia      Past Surgical History:   Procedure Laterality Date    BREAST BIOPSY      CARDIAC ELECTROPHYSIOLOGY STUDY AND ABLATION  4/06    CATARACT EXTRACTION      ICD implantation      INSERT / REPLACE / REMOVE PACEMAKER      RADIOFREQUENCY ABLATION       Family History   Problem Relation Age of Onset    Cancer Mother     Heart disease Mother     Heart disease Father     Cancer Brother 60     Social History     Socioeconomic History    Marital status:      Spouse name: marina pinzon    Number of children: 3    Years of education: Not on file    Highest education level: Not on file   Occupational History    Occupation: retired   Social Needs    Financial resource strain: Not hard at all    Food insecurity     Worry: Never true     Inability: Never true    Transportation needs     Medical: No     Non-medical: No   Tobacco Use    Smoking status: Never Smoker    Smokeless tobacco: Never Used   Substance and Sexual Activity    Alcohol use: No     Frequency: Never     Drinks per session: Patient refused     Binge frequency: Never    Drug use: No    Sexual activity: Not Currently     Partners: Male   Lifestyle    Physical activity     Days per week: 0 days     Minutes per session: Not on file    Stress: To some extent   Relationships    Social connections     Talks on phone: More than three times a week     Gets together: More than three times a week     Attends Protestant service: Not on file     Active member of club or organization: No     Attends meetings of clubs or organizations: Never     Relationship status:    Other Topics Concern    Not on file   Social History Narrative    Not on file     Review of Systems   A comprehensive 14-point review of systems was reviewed with patient  and was negative other than as specified above.     Objective:   There were no vitals filed for this visit.     Physical Exam  Constitutional:       General: She is not in acute distress.     Appearance: She is well-developed.   HENT:      Head: Normocephalic and atraumatic.   Eyes:      General: No scleral icterus.  Neck:      Musculoskeletal: Normal range of motion and neck supple.   Pulmonary:      Effort: Pulmonary effort is normal. No respiratory distress.   Musculoskeletal: Normal range of motion.   Skin:     Findings: No rash.   Neurological:      Mental Status: She is alert. Mental status is at baseline.      Comments: Pleasantly confused; lacks decision making capacity   Psychiatric:         Behavior: Behavior normal.         Thought Content: Thought content normal.         Assessment:       1. Dementia without behavioral disturbance, unspecified dementia type        Plan:           Problem List Items Addressed This Visit     None      Visit Diagnoses     Dementia without behavioral disturbance, unspecified dementia type            Will assist with ACP documents; consult to hospice when appropriate. Doing well with community based living as of now.

## 2020-07-02 NOTE — LETTER
July 8, 2020      Lee Dwyer MD  [address] [place] [address]           [facility]  [address]  [place] [address]  Phone: [phone]  Fax: [phone]          Patient: Karena Young   MR Number: [id number]   YOB: 1940   Date of Visit: 7/2/2020       Dear Dr. Lee Dwyer:    Thank you for referring Karena Young to me for evaluation. Attached you will find relevant portions of my assessment and plan of care.    If you have questions, please do not hesitate to call me. I look forward to following Karena Young along with you.    Sincerely,    Faith Crisostomo MD    Enclosure  CC:  No Recipients    If you would like to receive this communication electronically, please contact [email or web address] or [phone] to request more information on [employer] access.    For providers and/or their staff who would like to refer a patient to [facility], please contact us through our one-stop-shop provider referral line, [facility], at [phone].    If you feel you have received this communication in error or would no longer like to receive these types of communications, please e-mail [email or web address]

## 2020-07-08 ENCOUNTER — DOCUMENTATION ONLY (OUTPATIENT)
Dept: PALLIATIVE MEDICINE | Facility: CLINIC | Age: 80
End: 2020-07-08

## 2020-07-08 NOTE — PROGRESS NOTES
[facility] - Palliative Care  Medical Specialty       Patient Name: Karena Young  MRN: [id number]  Primary Care Physician: Lee Dwyer MD    Mailed LaPOST and instructions on how to complete and return per Dr. Crisostomo's note from virtual visit on 7/2.    ELBERT Ballard, [facility]  [phone]

## 2020-07-15 ENCOUNTER — TELEPHONE (OUTPATIENT)
Dept: INTERNAL MEDICINE | Facility: CLINIC | Age: 80
End: 2020-07-15

## 2020-07-15 NOTE — TELEPHONE ENCOUNTER
----- Message from Dayana Pritchett sent at 7/15/2020  3:38 PM CDT -----  Type:  Needs Medical Advice    Who Called: Bari DIANA with [facility]   Symptoms (please be specific):  decrease circulation to lower extremities  How long has patient had these symptoms:     Pharmacy name and phone #:     Would the patient rather a call back or a response via [facility]?    Call back  Best Call Back Number:    [phone]  Additional Information:  please call//adan/[facility]

## 2020-08-05 ENCOUNTER — TELEPHONE (OUTPATIENT)
Dept: INTERNAL MEDICINE | Facility: CLINIC | Age: 80
End: 2020-08-05

## 2020-08-05 NOTE — TELEPHONE ENCOUNTER
----- Message from Amie Catalan sent at 8/5/2020  1:44 PM CDT -----  [facility]  Request a call concerning a fax sent on this pt, no additional info given and can be reached at [phone]///[email or web address]

## 2020-08-12 NOTE — PROGRESS NOTES
- see Pneumonia due to COVID 19   Patient's INR is sub therapeutic at 1.5.  Will increase Warfarin dose to 2 mg on Wednesdays and Fridays, then 1 mg on all other days.  Recheck at lab on Wednesday, 5/30/2018.

## 2020-10-15 ENCOUNTER — LAB VISIT (OUTPATIENT)
Dept: LAB | Facility: HOSPITAL | Age: 80
End: 2020-10-15
Attending: FAMILY MEDICINE
Payer: MEDICARE

## 2020-10-15 ENCOUNTER — OFFICE VISIT (OUTPATIENT)
Dept: INTERNAL MEDICINE | Facility: CLINIC | Age: 80
End: 2020-10-15
Payer: MEDICARE

## 2020-10-15 VITALS
TEMPERATURE: 98 F | SYSTOLIC BLOOD PRESSURE: 110 MMHG | HEIGHT: 62 IN | WEIGHT: 125.69 LBS | DIASTOLIC BLOOD PRESSURE: 60 MMHG | HEART RATE: 62 BPM | BODY MASS INDEX: 23.13 KG/M2

## 2020-10-15 DIAGNOSIS — I48.20 CHRONIC A-FIB: ICD-10-CM

## 2020-10-15 DIAGNOSIS — E78.5 HYPERLIPIDEMIA, UNSPECIFIED HYPERLIPIDEMIA TYPE: ICD-10-CM

## 2020-10-15 DIAGNOSIS — E55.9 VITAMIN D DEFICIENCY: ICD-10-CM

## 2020-10-15 DIAGNOSIS — F03.90 DEMENTIA WITHOUT BEHAVIORAL DISTURBANCE, UNSPECIFIED DEMENTIA TYPE: ICD-10-CM

## 2020-10-15 DIAGNOSIS — I48.20 CHRONIC ATRIAL FIBRILLATION: ICD-10-CM

## 2020-10-15 DIAGNOSIS — Z00.00 ANNUAL PHYSICAL EXAM: Primary | ICD-10-CM

## 2020-10-15 LAB
ALBUMIN SERPL BCP-MCNC: 3.7 G/DL (ref 3.5–5.2)
ALP SERPL-CCNC: 99 U/L (ref 55–135)
ALT SERPL W/O P-5'-P-CCNC: 17 U/L (ref 10–44)
ANION GAP SERPL CALC-SCNC: 10 MMOL/L (ref 8–16)
AST SERPL-CCNC: 26 U/L (ref 10–40)
BASOPHILS # BLD AUTO: 0.05 K/UL (ref 0–0.2)
BASOPHILS NFR BLD: 0.8 % (ref 0–1.9)
BILIRUB SERPL-MCNC: 0.3 MG/DL (ref 0.1–1)
BUN SERPL-MCNC: 24 MG/DL (ref 8–23)
CALCIUM SERPL-MCNC: 9.9 MG/DL (ref 8.7–10.5)
CHLORIDE SERPL-SCNC: 99 MMOL/L (ref 95–110)
CHOLEST SERPL-MCNC: 171 MG/DL (ref 120–199)
CHOLEST/HDLC SERPL: 2.8 {RATIO} (ref 2–5)
CO2 SERPL-SCNC: 29 MMOL/L (ref 23–29)
CREAT SERPL-MCNC: 1.1 MG/DL (ref 0.5–1.4)
DIFFERENTIAL METHOD: ABNORMAL
EOSINOPHIL # BLD AUTO: 0.1 K/UL (ref 0–0.5)
EOSINOPHIL NFR BLD: 2 % (ref 0–8)
ERYTHROCYTE [DISTWIDTH] IN BLOOD BY AUTOMATED COUNT: 13.1 % (ref 11.5–14.5)
EST. GFR  (AFRICAN AMERICAN): 54.8 ML/MIN/1.73 M^2
EST. GFR  (NON AFRICAN AMERICAN): 47.5 ML/MIN/1.73 M^2
GLUCOSE SERPL-MCNC: 74 MG/DL (ref 70–110)
HCT VFR BLD AUTO: 42.7 % (ref 37–48.5)
HDLC SERPL-MCNC: 61 MG/DL (ref 40–75)
HDLC SERPL: 35.7 % (ref 20–50)
HGB BLD-MCNC: 13.6 G/DL (ref 12–16)
IMM GRANULOCYTES # BLD AUTO: 0.02 K/UL (ref 0–0.04)
IMM GRANULOCYTES NFR BLD AUTO: 0.3 % (ref 0–0.5)
LDLC SERPL CALC-MCNC: 85.2 MG/DL (ref 63–159)
LYMPHOCYTES # BLD AUTO: 0.9 K/UL (ref 1–4.8)
LYMPHOCYTES NFR BLD: 13.2 % (ref 18–48)
MCH RBC QN AUTO: 31.5 PG (ref 27–31)
MCHC RBC AUTO-ENTMCNC: 31.9 G/DL (ref 32–36)
MCV RBC AUTO: 99 FL (ref 82–98)
MONOCYTES # BLD AUTO: 0.9 K/UL (ref 0.3–1)
MONOCYTES NFR BLD: 13.5 % (ref 4–15)
NEUTROPHILS # BLD AUTO: 4.6 K/UL (ref 1.8–7.7)
NEUTROPHILS NFR BLD: 70.2 % (ref 38–73)
NONHDLC SERPL-MCNC: 110 MG/DL
NRBC BLD-RTO: 0 /100 WBC
PLATELET # BLD AUTO: 256 K/UL (ref 150–350)
PMV BLD AUTO: 10.9 FL (ref 9.2–12.9)
POTASSIUM SERPL-SCNC: 4.6 MMOL/L (ref 3.5–5.1)
PROT SERPL-MCNC: 7 G/DL (ref 6–8.4)
RBC # BLD AUTO: 4.32 M/UL (ref 4–5.4)
SODIUM SERPL-SCNC: 138 MMOL/L (ref 136–145)
TRIGL SERPL-MCNC: 124 MG/DL (ref 30–150)
TSH SERPL DL<=0.005 MIU/L-ACNC: 2.88 UIU/ML (ref 0.4–4)
WBC # BLD AUTO: 6.5 K/UL (ref 3.9–12.7)

## 2020-10-15 PROCEDURE — 85025 COMPLETE CBC W/AUTO DIFF WBC: CPT

## 2020-10-15 PROCEDURE — 3074F SYST BP LT 130 MM HG: CPT | Mod: CPTII,S$GLB,, | Performed by: FAMILY MEDICINE

## 2020-10-15 PROCEDURE — 99999 PR PBB SHADOW E&M-EST. PATIENT-LVL III: CPT | Mod: PBBFAC,,, | Performed by: FAMILY MEDICINE

## 2020-10-15 PROCEDURE — 80061 LIPID PANEL: CPT

## 2020-10-15 PROCEDURE — 3078F DIAST BP <80 MM HG: CPT | Mod: CPTII,S$GLB,, | Performed by: FAMILY MEDICINE

## 2020-10-15 PROCEDURE — 3074F PR MOST RECENT SYSTOLIC BLOOD PRESSURE < 130 MM HG: ICD-10-PCS | Mod: CPTII,S$GLB,, | Performed by: FAMILY MEDICINE

## 2020-10-15 PROCEDURE — 3078F PR MOST RECENT DIASTOLIC BLOOD PRESSURE < 80 MM HG: ICD-10-PCS | Mod: CPTII,S$GLB,, | Performed by: FAMILY MEDICINE

## 2020-10-15 PROCEDURE — 99397 PER PM REEVAL EST PAT 65+ YR: CPT | Mod: S$GLB,,, | Performed by: FAMILY MEDICINE

## 2020-10-15 PROCEDURE — 80053 COMPREHEN METABOLIC PANEL: CPT

## 2020-10-15 PROCEDURE — 36415 COLL VENOUS BLD VENIPUNCTURE: CPT | Mod: PO

## 2020-10-15 PROCEDURE — 99397 PR PREVENTIVE VISIT,EST,65 & OVER: ICD-10-PCS | Mod: S$GLB,,, | Performed by: FAMILY MEDICINE

## 2020-10-15 PROCEDURE — 99999 PR PBB SHADOW E&M-EST. PATIENT-LVL III: ICD-10-PCS | Mod: PBBFAC,,, | Performed by: FAMILY MEDICINE

## 2020-10-15 PROCEDURE — 84443 ASSAY THYROID STIM HORMONE: CPT

## 2020-10-15 PROCEDURE — 82306 VITAMIN D 25 HYDROXY: CPT

## 2020-10-15 NOTE — PROGRESS NOTES
"Subjective:      Patient ID: Karena Young is a 80 y.o. female.    Chief Complaint:  Annual review    HPI  81 yo female with dementia here with daughter today for annual.  Followed by Cards, hx of VITO Dillon on Eliquis.  Hx of HOCM as well.  She has been keeping track of BP//running low consistently.  Pt is quite demented//taking history/ROS is quite difficult.  Memory worsening  Daughter had tried putting pt in assistant living, her brothers said no.  Pt at home with sitters from 8-7 daily.  Pt has a small dog/Buster.  She has had no falls.  Her daughter lives about 75 feet from her and her son lives behind her.  Mood is good//occ will get ornery with her sitters.      Past Medical History:   Diagnosis Date    *Atrial fibrillation     *Atrial flutter     Anticoagulant long-term use     Breast cancer     Cardiomyopathy     HOCM (hypertrophic obstructive cardiomyopathy) 9/6/2013    Hyperlipidemia     Hypertension     Macular degeneration     Orthostatic hypotension     Paroxysmal atrial fibrillation 9/12/2012    Ventricular tachycardia      Family History   Problem Relation Age of Onset    Cancer Mother     Heart disease Mother     Heart disease Father     Cancer Brother 60     Past Surgical History:   Procedure Laterality Date    BREAST BIOPSY      CARDIAC ELECTROPHYSIOLOGY STUDY AND ABLATION  4/06    CATARACT EXTRACTION      ICD implantation      INSERT / REPLACE / REMOVE PACEMAKER      RADIOFREQUENCY ABLATION       Social History     Tobacco Use    Smoking status: Never Smoker    Smokeless tobacco: Never Used   Substance Use Topics    Alcohol use: No     Frequency: Never     Drinks per session: Patient refused     Binge frequency: Never    Drug use: No       /60   Pulse 62   Temp 97.5 °F (36.4 °C) (Temporal)   Ht 5' 2" (1.575 m)   Wt 57 kg (125 lb 10.6 oz)   BMI 22.98 kg/m²     Review of Systems   Unable to perform ROS: Dementia       Objective:     Physical " Exam  Constitutional:       General: She is not in acute distress.     Appearance: She is well-developed.   Eyes:      Pupils: Pupils are equal, round, and reactive to light.   Neck:      Musculoskeletal: Normal range of motion and neck supple.      Thyroid: No thyromegaly.   Cardiovascular:      Rate and Rhythm: Normal rate and regular rhythm.      Heart sounds: Murmur present.   Pulmonary:      Effort: Pulmonary effort is normal. No respiratory distress.      Breath sounds: Normal breath sounds. No wheezing or rales.   Abdominal:      General: Bowel sounds are normal. There is no distension.      Palpations: Abdomen is soft.      Tenderness: There is no abdominal tenderness. There is no guarding.   Musculoskeletal:      Right lower leg: No edema.      Left lower leg: No edema.   Skin:     General: Skin is warm and dry.      Findings: No rash.   Neurological:      Mental Status: She is alert. Mental status is at baseline.      Cranial Nerves: No cranial nerve deficit.   Psychiatric:         Mood and Affect: Mood normal.         Behavior: Behavior normal.         Lab Results   Component Value Date    WBC 7.34 02/04/2020    HGB 14.2 02/04/2020    HCT 43.5 02/04/2020     02/04/2020    CHOL 152 10/10/2019    TRIG 105 10/10/2019    HDL 51 10/10/2019    ALT 28 02/04/2020    AST 31 02/04/2020     02/04/2020    K 4.4 02/04/2020    CL 98 02/04/2020    CREATININE 1.2 02/04/2020    BUN 20 02/04/2020    CO2 30 (H) 02/04/2020    TSH 2.518 06/15/2020    INR 2.4 08/15/2018       Assessment:     1. Annual physical exam    2. Dementia without behavioral disturbance, unspecified dementia type    3. Chronic atrial fibrillation    4. Vitamin D deficiency    5. Hyperlipidemia, unspecified hyperlipidemia type         Plan:     Annual physical exam    Dementia without behavioral disturbance, unspecified dementia type    Chronic atrial fibrillation    Vitamin D deficiency  -     Vitamin D; Future; Expected date:  10/15/2020    Hyperlipidemia, unspecified hyperlipidemia type  -     CBC auto differential; Future; Expected date: 10/15/2020  -     Comprehensive Metabolic Panel; Future; Expected date: 10/15/2020  -     Lipid Panel; Future; Expected date: 10/15/2020  -     TSH; Future; Expected date: 10/15/2020    BP low//hold Losartan and monitor.  If BP starts going up//add back 1/2 losartan 25mg daily.  Cont BB/amiodoarone.  Check labs today  Stop Aricept//no improvement//risks outweigh benefits  Flu shot today  24 hr care encouraged  Fall precautions  F/u annually and PRN

## 2020-10-15 NOTE — PATIENT INSTRUCTIONS
Hold Losartan and monitor BP.  Stop Aricept//memory pill, not helping and might make things worse for her in the evening.

## 2020-10-16 DIAGNOSIS — Z17.0 MALIGNANT NEOPLASM OF UPPER-OUTER QUADRANT OF RIGHT BREAST IN FEMALE, ESTROGEN RECEPTOR POSITIVE: ICD-10-CM

## 2020-10-16 DIAGNOSIS — Z79.811 AROMATASE INHIBITOR USE: ICD-10-CM

## 2020-10-16 DIAGNOSIS — C50.411 MALIGNANT NEOPLASM OF UPPER-OUTER QUADRANT OF RIGHT BREAST IN FEMALE, ESTROGEN RECEPTOR POSITIVE: ICD-10-CM

## 2020-10-16 LAB — 25(OH)D3+25(OH)D2 SERPL-MCNC: 31 NG/ML (ref 30–96)

## 2020-10-16 RX ORDER — LETROZOLE 2.5 MG/1
TABLET, FILM COATED ORAL
Qty: 30 TABLET | Refills: 11 | Status: SHIPPED | OUTPATIENT
Start: 2020-10-16 | End: 2021-02-24

## 2020-10-26 ENCOUNTER — PATIENT MESSAGE (OUTPATIENT)
Dept: INTERNAL MEDICINE | Facility: CLINIC | Age: 80
End: 2020-10-26

## 2020-11-10 RX ORDER — METOPROLOL TARTRATE 100 MG/1
100 TABLET ORAL 2 TIMES DAILY
Qty: 60 TABLET | Refills: 3 | Status: SHIPPED | OUTPATIENT
Start: 2020-11-10 | End: 2021-03-05

## 2020-11-20 ENCOUNTER — PATIENT MESSAGE (OUTPATIENT)
Dept: INTERNAL MEDICINE | Facility: CLINIC | Age: 80
End: 2020-11-20

## 2020-11-20 DIAGNOSIS — M25.561 CHRONIC PAIN OF BOTH KNEES: Primary | ICD-10-CM

## 2020-11-20 DIAGNOSIS — I48.0 PAROXYSMAL ATRIAL FIBRILLATION: Primary | ICD-10-CM

## 2020-11-20 DIAGNOSIS — M25.562 CHRONIC PAIN OF BOTH KNEES: Primary | ICD-10-CM

## 2020-11-20 DIAGNOSIS — G89.29 CHRONIC PAIN OF BOTH KNEES: Primary | ICD-10-CM

## 2020-11-23 ENCOUNTER — TELEPHONE (OUTPATIENT)
Dept: ORTHOPEDICS | Facility: CLINIC | Age: 80
End: 2020-11-23

## 2020-11-30 DIAGNOSIS — I48.0 PAROXYSMAL ATRIAL FIBRILLATION: Primary | ICD-10-CM

## 2020-11-30 DIAGNOSIS — I47.29 PAROXYSMAL VENTRICULAR TACHYCARDIA: ICD-10-CM

## 2020-12-01 RX ORDER — AMIODARONE HYDROCHLORIDE 200 MG/1
TABLET ORAL
Qty: 30 TABLET | Refills: 11 | Status: SHIPPED | OUTPATIENT
Start: 2020-12-01 | End: 2021-02-01 | Stop reason: SDUPTHER

## 2020-12-02 DIAGNOSIS — I47.29 PAROXYSMAL VENTRICULAR TACHYCARDIA: ICD-10-CM

## 2020-12-02 DIAGNOSIS — I42.1 HOCM (HYPERTROPHIC OBSTRUCTIVE CARDIOMYOPATHY): ICD-10-CM

## 2020-12-02 DIAGNOSIS — Z95.810 ICD (IMPLANTABLE CARDIOVERTER-DEFIBRILLATOR) IN PLACE: Primary | ICD-10-CM

## 2020-12-03 ENCOUNTER — PATIENT MESSAGE (OUTPATIENT)
Dept: INTERNAL MEDICINE | Facility: CLINIC | Age: 80
End: 2020-12-03

## 2020-12-04 ENCOUNTER — TELEPHONE (OUTPATIENT)
Dept: ORTHOPEDICS | Facility: CLINIC | Age: 80
End: 2020-12-04

## 2020-12-04 DIAGNOSIS — G89.29 CHRONIC PAIN OF BOTH KNEES: Primary | ICD-10-CM

## 2020-12-04 DIAGNOSIS — M25.561 CHRONIC PAIN OF BOTH KNEES: Primary | ICD-10-CM

## 2020-12-04 DIAGNOSIS — M25.562 CHRONIC PAIN OF BOTH KNEES: Primary | ICD-10-CM

## 2020-12-05 NOTE — PROGRESS NOTES
Contacted Ms. Morris with dose (holding) instructions/dark leafy vegetables for patient (see notes).  Voiced understanding.   unable to comprehend, pt intubated

## 2020-12-06 ENCOUNTER — PATIENT OUTREACH (OUTPATIENT)
Dept: ADMINISTRATIVE | Facility: OTHER | Age: 80
End: 2020-12-06

## 2020-12-07 ENCOUNTER — HOSPITAL ENCOUNTER (OUTPATIENT)
Dept: RADIOLOGY | Facility: HOSPITAL | Age: 80
Discharge: HOME | End: 2020-12-07
Attending: STUDENT IN AN ORGANIZED HEALTH CARE EDUCATION/TRAINING PROGRAM
Payer: MEDICARE

## 2020-12-07 ENCOUNTER — OFFICE VISIT (OUTPATIENT)
Dept: ORTHOPEDICS | Facility: CLINIC | Age: 80
End: 2020-12-07
Payer: MEDICARE

## 2020-12-07 ENCOUNTER — CLINICAL SUPPORT (OUTPATIENT)
Dept: CARDIOLOGY | Facility: HOSPITAL | Age: 80
End: 2020-12-07
Attending: INTERNAL MEDICINE
Payer: MEDICARE

## 2020-12-07 ENCOUNTER — HOSPITAL ENCOUNTER (OUTPATIENT)
Dept: CARDIOLOGY | Facility: HOSPITAL | Age: 80
Discharge: HOME | End: 2020-12-07
Attending: INTERNAL MEDICINE
Payer: MEDICARE

## 2020-12-07 ENCOUNTER — OFFICE VISIT (OUTPATIENT)
Dept: CARDIOLOGY | Facility: CLINIC | Age: 80
End: 2020-12-07
Payer: MEDICARE

## 2020-12-07 ENCOUNTER — PATIENT MESSAGE (OUTPATIENT)
Dept: ORTHOPEDICS | Facility: CLINIC | Age: 80
End: 2020-12-07

## 2020-12-07 VITALS — HEIGHT: 62 IN | WEIGHT: 127 LBS | BODY MASS INDEX: 23.37 KG/M2

## 2020-12-07 VITALS
SYSTOLIC BLOOD PRESSURE: 130 MMHG | HEART RATE: 61 BPM | OXYGEN SATURATION: 97 % | BODY MASS INDEX: 23.23 KG/M2 | DIASTOLIC BLOOD PRESSURE: 80 MMHG | WEIGHT: 127 LBS

## 2020-12-07 DIAGNOSIS — I45.10 RBBB: ICD-10-CM

## 2020-12-07 DIAGNOSIS — I48.0 PAROXYSMAL ATRIAL FIBRILLATION: ICD-10-CM

## 2020-12-07 DIAGNOSIS — I47.29 PAROXYSMAL VENTRICULAR TACHYCARDIA: ICD-10-CM

## 2020-12-07 DIAGNOSIS — I48.3 TYPICAL ATRIAL FLUTTER: ICD-10-CM

## 2020-12-07 DIAGNOSIS — Z95.810 IMPLANTABLE CARDIOVERTER-DEFIBRILLATOR (ICD) IN SITU: ICD-10-CM

## 2020-12-07 DIAGNOSIS — M25.561 CHRONIC PAIN OF BOTH KNEES: ICD-10-CM

## 2020-12-07 DIAGNOSIS — Z95.810 ICD (IMPLANTABLE CARDIOVERTER-DEFIBRILLATOR) IN PLACE: ICD-10-CM

## 2020-12-07 DIAGNOSIS — I42.1 HOCM (HYPERTROPHIC OBSTRUCTIVE CARDIOMYOPATHY): ICD-10-CM

## 2020-12-07 DIAGNOSIS — M17.0 TRICOMPARTMENT OSTEOARTHRITIS OF BOTH KNEES: ICD-10-CM

## 2020-12-07 DIAGNOSIS — Z79.811 AROMATASE INHIBITOR USE: ICD-10-CM

## 2020-12-07 DIAGNOSIS — G89.29 CHRONIC PAIN OF BOTH KNEES: ICD-10-CM

## 2020-12-07 DIAGNOSIS — Z91.89 AT RISK FOR AMIODARONE TOXICITY WITH LONG TERM USE: ICD-10-CM

## 2020-12-07 DIAGNOSIS — Z79.899 AT RISK FOR AMIODARONE TOXICITY WITH LONG TERM USE: ICD-10-CM

## 2020-12-07 DIAGNOSIS — M25.561 CHRONIC PAIN OF BOTH KNEES: Primary | ICD-10-CM

## 2020-12-07 DIAGNOSIS — Z86.79 S/P CRYOABLATION OF ARRHYTHMIA: ICD-10-CM

## 2020-12-07 DIAGNOSIS — Z98.890 HISTORY OF RADIOFREQUENCY ABLATION (RFA) FOR COMPLEX RIGHT ATRIAL ARRHYTHMIA: ICD-10-CM

## 2020-12-07 DIAGNOSIS — G89.29 CHRONIC PAIN OF BOTH KNEES: Primary | ICD-10-CM

## 2020-12-07 DIAGNOSIS — M25.562 CHRONIC PAIN OF BOTH KNEES: Primary | ICD-10-CM

## 2020-12-07 DIAGNOSIS — E78.5 HYPERLIPIDEMIA WITH TARGET LDL LESS THAN 130: Chronic | ICD-10-CM

## 2020-12-07 DIAGNOSIS — I48.19 PERSISTENT ATRIAL FIBRILLATION: Primary | ICD-10-CM

## 2020-12-07 DIAGNOSIS — M25.562 CHRONIC PAIN OF BOTH KNEES: ICD-10-CM

## 2020-12-07 DIAGNOSIS — Z98.890 S/P CRYOABLATION OF ARRHYTHMIA: ICD-10-CM

## 2020-12-07 PROCEDURE — 99203 OFFICE O/P NEW LOW 30 MIN: CPT | Mod: 25,S$GLB,, | Performed by: STUDENT IN AN ORGANIZED HEALTH CARE EDUCATION/TRAINING PROGRAM

## 2020-12-07 PROCEDURE — 3075F SYST BP GE 130 - 139MM HG: CPT | Mod: CPTII,S$GLB,, | Performed by: INTERNAL MEDICINE

## 2020-12-07 PROCEDURE — 99999 PR PBB SHADOW E&M-EST. PATIENT-LVL II: CPT | Mod: PBBFAC,,,

## 2020-12-07 PROCEDURE — 73564 X-RAY EXAM KNEE 4 OR MORE: CPT | Mod: TC,50

## 2020-12-07 PROCEDURE — 99215 OFFICE O/P EST HI 40 MIN: CPT | Mod: S$GLB,,, | Performed by: INTERNAL MEDICINE

## 2020-12-07 PROCEDURE — 73564 X-RAY EXAM KNEE 4 OR MORE: CPT | Mod: 26,50,, | Performed by: RADIOLOGY

## 2020-12-07 PROCEDURE — 3078F PR MOST RECENT DIASTOLIC BLOOD PRESSURE < 80 MM HG: ICD-10-PCS | Mod: CPTII,S$GLB,, | Performed by: STUDENT IN AN ORGANIZED HEALTH CARE EDUCATION/TRAINING PROGRAM

## 2020-12-07 PROCEDURE — 3074F SYST BP LT 130 MM HG: CPT | Mod: CPTII,S$GLB,, | Performed by: STUDENT IN AN ORGANIZED HEALTH CARE EDUCATION/TRAINING PROGRAM

## 2020-12-07 PROCEDURE — 3079F PR MOST RECENT DIASTOLIC BLOOD PRESSURE 80-89 MM HG: ICD-10-PCS | Mod: CPTII,S$GLB,, | Performed by: INTERNAL MEDICINE

## 2020-12-07 PROCEDURE — 93005 ELECTROCARDIOGRAM TRACING: CPT

## 2020-12-07 PROCEDURE — 20610 LARGE JOINT ASPIRATION/INJECTION: BILATERAL KNEE: ICD-10-PCS | Mod: 50,S$GLB,, | Performed by: STUDENT IN AN ORGANIZED HEALTH CARE EDUCATION/TRAINING PROGRAM

## 2020-12-07 PROCEDURE — 3288F FALL RISK ASSESSMENT DOCD: CPT | Mod: CPTII,S$GLB,, | Performed by: STUDENT IN AN ORGANIZED HEALTH CARE EDUCATION/TRAINING PROGRAM

## 2020-12-07 PROCEDURE — 1159F MED LIST DOCD IN RCRD: CPT | Mod: S$GLB,,, | Performed by: STUDENT IN AN ORGANIZED HEALTH CARE EDUCATION/TRAINING PROGRAM

## 2020-12-07 PROCEDURE — 1125F PR PAIN SEVERITY QUANTIFIED, PAIN PRESENT: ICD-10-PCS | Mod: S$GLB,,, | Performed by: STUDENT IN AN ORGANIZED HEALTH CARE EDUCATION/TRAINING PROGRAM

## 2020-12-07 PROCEDURE — 1159F PR MEDICATION LIST DOCUMENTED IN MEDICAL RECORD: ICD-10-PCS | Mod: S$GLB,,, | Performed by: INTERNAL MEDICINE

## 2020-12-07 PROCEDURE — 3075F PR MOST RECENT SYSTOLIC BLOOD PRESS GE 130-139MM HG: ICD-10-PCS | Mod: CPTII,S$GLB,, | Performed by: INTERNAL MEDICINE

## 2020-12-07 PROCEDURE — 1159F PR MEDICATION LIST DOCUMENTED IN MEDICAL RECORD: ICD-10-PCS | Mod: S$GLB,,, | Performed by: STUDENT IN AN ORGANIZED HEALTH CARE EDUCATION/TRAINING PROGRAM

## 2020-12-07 PROCEDURE — 99215 PR OFFICE/OUTPT VISIT, EST, LEVL V, 40-54 MIN: ICD-10-PCS | Mod: S$GLB,,, | Performed by: INTERNAL MEDICINE

## 2020-12-07 PROCEDURE — 1159F MED LIST DOCD IN RCRD: CPT | Mod: S$GLB,,, | Performed by: INTERNAL MEDICINE

## 2020-12-07 PROCEDURE — 1125F AMNT PAIN NOTED PAIN PRSNT: CPT | Mod: S$GLB,,, | Performed by: STUDENT IN AN ORGANIZED HEALTH CARE EDUCATION/TRAINING PROGRAM

## 2020-12-07 PROCEDURE — 93283 PRGRMG EVAL IMPLANTABLE DFB: CPT

## 2020-12-07 PROCEDURE — 3078F DIAST BP <80 MM HG: CPT | Mod: CPTII,S$GLB,, | Performed by: STUDENT IN AN ORGANIZED HEALTH CARE EDUCATION/TRAINING PROGRAM

## 2020-12-07 PROCEDURE — 99999 PR PBB SHADOW E&M-EST. PATIENT-LVL III: CPT | Mod: PBBFAC,,, | Performed by: INTERNAL MEDICINE

## 2020-12-07 PROCEDURE — 93010 ELECTROCARDIOGRAM REPORT: CPT | Mod: ,,, | Performed by: INTERNAL MEDICINE

## 2020-12-07 PROCEDURE — 99999 PR PBB SHADOW E&M-EST. PATIENT-LVL III: CPT | Mod: PBBFAC,,, | Performed by: STUDENT IN AN ORGANIZED HEALTH CARE EDUCATION/TRAINING PROGRAM

## 2020-12-07 PROCEDURE — 93283 PRGRMG EVAL IMPLANTABLE DFB: CPT | Mod: 26,,, | Performed by: INTERNAL MEDICINE

## 2020-12-07 PROCEDURE — 99999 PR PBB SHADOW E&M-EST. PATIENT-LVL III: ICD-10-PCS | Mod: PBBFAC,,, | Performed by: INTERNAL MEDICINE

## 2020-12-07 PROCEDURE — 20610 DRAIN/INJ JOINT/BURSA W/O US: CPT | Mod: 50,S$GLB,, | Performed by: STUDENT IN AN ORGANIZED HEALTH CARE EDUCATION/TRAINING PROGRAM

## 2020-12-07 PROCEDURE — 99999 PR PBB SHADOW E&M-EST. PATIENT-LVL II: ICD-10-PCS | Mod: PBBFAC,,,

## 2020-12-07 PROCEDURE — 1100F PR PT FALLS ASSESS DOC 2+ FALLS/FALL W/INJURY/YR: ICD-10-PCS | Mod: CPTII,S$GLB,, | Performed by: STUDENT IN AN ORGANIZED HEALTH CARE EDUCATION/TRAINING PROGRAM

## 2020-12-07 PROCEDURE — 99999 PR PBB SHADOW E&M-EST. PATIENT-LVL III: ICD-10-PCS | Mod: PBBFAC,,, | Performed by: STUDENT IN AN ORGANIZED HEALTH CARE EDUCATION/TRAINING PROGRAM

## 2020-12-07 PROCEDURE — 93283 CARDIAC DEVICE CHECK - IN CLINIC & HOSPITAL: ICD-10-PCS | Mod: 26,,, | Performed by: INTERNAL MEDICINE

## 2020-12-07 PROCEDURE — 3288F PR FALLS RISK ASSESSMENT DOCUMENTED: ICD-10-PCS | Mod: CPTII,S$GLB,, | Performed by: STUDENT IN AN ORGANIZED HEALTH CARE EDUCATION/TRAINING PROGRAM

## 2020-12-07 PROCEDURE — 3074F PR MOST RECENT SYSTOLIC BLOOD PRESSURE < 130 MM HG: ICD-10-PCS | Mod: CPTII,S$GLB,, | Performed by: STUDENT IN AN ORGANIZED HEALTH CARE EDUCATION/TRAINING PROGRAM

## 2020-12-07 PROCEDURE — 73564 XR KNEE ORTHO BILAT WITH FLEXION: ICD-10-PCS | Mod: 26,50,, | Performed by: RADIOLOGY

## 2020-12-07 PROCEDURE — 3079F DIAST BP 80-89 MM HG: CPT | Mod: CPTII,S$GLB,, | Performed by: INTERNAL MEDICINE

## 2020-12-07 PROCEDURE — 1100F PTFALLS ASSESS-DOCD GE2>/YR: CPT | Mod: CPTII,S$GLB,, | Performed by: STUDENT IN AN ORGANIZED HEALTH CARE EDUCATION/TRAINING PROGRAM

## 2020-12-07 PROCEDURE — 99203 PR OFFICE/OUTPT VISIT, NEW, LEVL III, 30-44 MIN: ICD-10-PCS | Mod: 25,S$GLB,, | Performed by: STUDENT IN AN ORGANIZED HEALTH CARE EDUCATION/TRAINING PROGRAM

## 2020-12-07 PROCEDURE — 93010 EKG 12-LEAD: ICD-10-PCS | Mod: ,,, | Performed by: INTERNAL MEDICINE

## 2020-12-07 RX ORDER — LIDOCAINE HYDROCHLORIDE 10 MG/ML
2 INJECTION INFILTRATION; PERINEURAL
Status: DISCONTINUED | OUTPATIENT
Start: 2020-12-07 | End: 2020-12-07 | Stop reason: HOSPADM

## 2020-12-07 RX ORDER — BUPIVACAINE HYDROCHLORIDE 5 MG/ML
2 INJECTION, SOLUTION PERINEURAL
Status: DISCONTINUED | OUTPATIENT
Start: 2020-12-07 | End: 2020-12-07 | Stop reason: HOSPADM

## 2020-12-07 RX ORDER — TRIAMCINOLONE ACETONIDE 40 MG/ML
40 INJECTION, SUSPENSION INTRA-ARTICULAR; INTRAMUSCULAR
Status: DISCONTINUED | OUTPATIENT
Start: 2020-12-07 | End: 2020-12-07 | Stop reason: HOSPADM

## 2020-12-07 RX ADMIN — BUPIVACAINE HYDROCHLORIDE 2 ML: 5 INJECTION, SOLUTION PERINEURAL at 03:12

## 2020-12-07 RX ADMIN — LIDOCAINE HYDROCHLORIDE 2 ML: 10 INJECTION INFILTRATION; PERINEURAL at 03:12

## 2020-12-07 RX ADMIN — TRIAMCINOLONE ACETONIDE 40 MG: 40 INJECTION, SUSPENSION INTRA-ARTICULAR; INTRAMUSCULAR at 03:12

## 2020-12-07 NOTE — PROGRESS NOTES
Subjective:   Patient ID:  Karena Young is a 80 y.o. female     Chief complaint:Follow-up      HPI  Background (see note dated 5/25/2020):  Background:  History of HOCM s/p ETOH ablation 4/06, ventricular tachycardia, ICD, HTN, orthostatic hypotension, and HLP.Also noted to have transient A Flutter and AF in 2009 and 2010. Has had no AF since amiodarone -- She also later had VT and is on amio for that now -- since 2012   ICD initially implanted by Dr Gonzales in 11/04 for syncope with VTNS>>replaced in 6/2008   Had a syncopal spell 2/20/12 -- ICD revealed event at 3:44PM-- 4 sec VTNS, also had NSVT x 7; longest 7 seconds on 1/1/12>>started on amiodarone and most recent ICD interrogation revealed no NSVT.   She has had no further syncope   AF and Aflutter in the past was noted by ICD and ATPed.  Mild CKD -- Cr 1.3 to 1.4 -- stable   CHADS=1 (HTN) on aspirin   DLCO normal 6/5/12, 6/2014 82%, FVC 73% TSH 6/15 was WNL -- 2.5  Has had some OH dizziness     Had ICD replacement and lead was buried deeper in pocket In August 2013 --   Echo from 6/2014:  >>  1 - Concentric remodeling.   2 - Normal left ventricular systolic function (EF 60-65%).   3 - Normal right ventricular systolic function .   4 - Left ventricular diastolic dysfunction.   5 - Severe left atrial enlargement. BAXTER 42  6 - Mild aortic regurgitation.   7 - Mild mitral regurgitation.   8 - Mild pulmonary hypertension.   9 - Mildly elevated central venous pressure.    ECG today shows AR pacing with RBBB-- 60 bpm-      Update till 12/12/16:  She has been on slo mo for a year or more but stable  Gets HOBBS at less than a block  Sleeps 12 hrs a night and then takes a 2 hr nap  She has lost her  recently last sept and she has moved to her sister's house.   ECG today shows AR pacing with FDAVB and RBBB     Update 10/15/17:  She had two ICD shocks last week -- she was ASxc prior. The shocks were one at a time --  by 3 min. This was  "related to A Flutter  She feels O/W well -- no Fc limitations     Update 2/22/18:  She had EP/RFA in November: >>  Pacing demonstrated bidirectional conduction flow across the cavotricuspid isthmus.  Atrial burst pacing was performed and atrial flutter was induced.  This appeared to be atrial type 1 flutter with counterclockwise activation within the right atrium.    The cycle length was 320 milliseconds.  Unfortunately, entrainment pacing was not doable as the tachycardia would either terminate or transform into get an irregular type 2 atrial flutter, which would often terminate spontaneously.  On the strength of the baseline ECG findings, we decided to proceed with standard radiofrequency ablation of the cavotricuspid isthmus.   She was kept on amio and did well till a couple days ago  She has had multiple shocks two days ago -- 5 total shocks in the VT zone -- EGMs c/w AVNRT vs AT with 1:1 AV conduction and long FDAVB,  msec  Closer review suggests the latter with A falling eventually in the blinding period thus initiating "VT" detection.     I have reviewed the actual image of the ECG tracing obtained today and it shows A flutter with 2:1 block -- still looks like CTI but CW. Has RBBB  With QRSd 124.     Update 8/17/18:  5/21/18 PVI was done:  1.  Successful pulmonary vein isolation using cryoablation.  All four pulmonary veins were isolated in a WACA type manner.  2.  Successful pre- and post-ablative electrophysiologic study with coronary sinus recording.  3.  Successful pre- and post-ablative electrophysiologic and electroanatomic mapping of pulmonary veins and left atrium.  4.  Successful SVC electroanatomical and electrophysiologic mapping.  This appeared to be silent.  5.  Successful cardioversion.  The patient left the lab in normal sinus rhythm.  6.  Successful pre and postop reprogramming of the patient's ICD.     Has had no issues since then   ICD eval w/o any A F etc     Update 3/22/2019:  She " "continues to do very well and has had no issues the past 6 months   She is off coumadin, on Eliquis and ASA   No HOBBS lately   Her breast cancer is "cured".   She was on a trip to [place] this past week   She does not want to do DLCO testing -- was OK in 2014 and she is low risk (no smoking, no asbestos exposure).  ICD is in XC repair     Update 5/25/2020:  She has been doing well from a cardiovascular point of view.  She has no chest pain, no palpitations, no unusual shortness of breath, no PND, no orthopnea, no leg edema, no syncope and no presyncope.  She is however becoming more dependent on help due to mental acuity changes and forgetfulness.  She now has a sitter who comes and stays with her during the day, every day.  The visit was done with her and her daughter with her daughter taking the lead.  Last full ICD evaluation was in March (22nd).  This demonstrated normal function, long battery life and no PAF.  There was intermittent mode switching due to frequent PACs.     Update since then:  Today AF burden by ICD is 20.6% EGM shows AT at 440 msec with 2:1 AV block   Today she is in SR  She had amio tox screen- as usual PFT/DLCO not done- Amio 1.1/1.3, TSH 2.3- CXR  Looks Ok from 6/2020  ICD has some battery left  She is doing well in general - no real complaints - she is forgetful and lives with sitters at home with DTR next door  Has knee pains   Current Outpatient Medications   Medication Sig    acetaminophen (TYLENOL ARTHRITIS ORAL) Take by mouth.    amiodarone (PACERONE) 200 MG Tab Take 1 tablet (200 mg total) by mouth once daily.    antiox.mv no.10/omeg3s/lut/maria isabel (I-CAPS ORAL) Take 1 capsule by mouth once daily.    apixaban (ELIQUIS) 5 mg Tab Take 1 tablet (5 mg total) by mouth 2 (two) times a day.    atorvastatin (LIPITOR) 20 MG tablet TAKE 1 TABLET BY MOUTH AT BEDTIME    calcium citrate-vitamin D3 315-200 mg (CITRACAL+D) 315-200 mg-unit per tablet Take 1 tablet by mouth 2 (two) times daily.      " letrozole (FEMARA) 2.5 mg Tab TAKE 1 TABLET(2.5 MG) BY MOUTH EVERY DAY.    metoprolol tartrate (LOPRESSOR) 100 MG tablet Take 1 tablet (100 mg total) by mouth 2 (two) times a day.    amiodarone (PACERONE) 200 MG Tab Take one tablet everyday except for Wednesdays and Sundays    amiodarone (PACERONE) 200 MG Tab Take 1 tablet (200 mg total) by mouth once daily.     No current facility-administered medications for this visit.      Review of Systems   Constitution: Negative for decreased appetite, weight gain and weight loss.   HENT: Negative for nosebleeds.    Eyes: Negative for blurred vision and visual disturbance.   Cardiovascular: Negative for chest pain, claudication, cyanosis, dyspnea on exertion, irregular heartbeat, leg swelling, near-syncope, orthopnea, palpitations, paroxysmal nocturnal dyspnea and syncope.   Respiratory: Negative for cough, shortness of breath and wheezing.    Endocrine: Negative for heat intolerance.   Skin: Negative for rash.   Musculoskeletal: Negative for muscle weakness and myalgias.   Gastrointestinal: Negative for abdominal pain, anorexia, melena, nausea and vomiting.   Genitourinary: Negative for menorrhagia.   Neurological: Negative for excessive daytime sleepiness, dizziness, headaches, loss of balance, seizures, vertigo and weakness.   Psychiatric/Behavioral: Negative for altered mental status and depression. The patient is not nervous/anxious.        Objective:   Physical Exam   Constitutional: She appears well-developed and well-nourished.   HENT:   Head: Normocephalic and atraumatic.   Right Ear: External ear normal.   Left Ear: External ear normal.   Eyes: Pupils are equal, round, and reactive to light. Conjunctivae are normal. Left eye exhibits no discharge. No scleral icterus.   Neck: Normal range of motion. Neck supple. No thyromegaly present.   Cardiovascular: Normal rate, regular rhythm and intact distal pulses. Exam reveals no gallop, no S3, no S4, no friction rub, no  midsystolic click and no opening snap.   Murmur heard.   Medium-pitched midsystolic murmur is present with a grade of 1/6 at the upper left sternal border.  Pulses:       Carotid pulses are 2+ on the right side and 2+ on the left side.       Radial pulses are 2+ on the right side and 2+ on the left side.        Dorsalis pedis pulses are 2+ on the right side and 2+ on the left side.        Posterior tibial pulses are 2+ on the right side and 2+ on the left side.   Pulmonary/Chest: Effort normal and breath sounds normal.   Device pocket is in excellent repair   Abdominal: Soft. She exhibits no distension. There is no hepatomegaly. There is no abdominal tenderness. There is no guarding.   Musculoskeletal:      Right ankle: She exhibits no swelling.      Left ankle: She exhibits no swelling.      Right lower leg: She exhibits no swelling.      Left lower leg: She exhibits no swelling.   Neurological: She is alert. She has normal strength. No cranial nerve deficit. Gait normal.   Dementia affects responsiveness    Skin: Skin is warm, dry and intact. No rash noted. No cyanosis. Nails show no clubbing.   Psychiatric: She has a normal mood and affect. Her speech is normal and behavior is normal. Thought content normal. Cognition and memory are normal.   Nursing note and vitals reviewed.    /80 (BP Location: Right arm, Patient Position: Sitting, BP Method: Medium (Manual))   Pulse 61   Wt 57.6 kg (127 lb)   SpO2 97%   BMI 23.23 kg/m²      Assessment:      1. Persistent atrial fibrillation    2. S/P cryoablation of arrhythmia    3. Typical atrial flutter    4. RBBB    5. Paroxysmal ventricular tachycardia    6. Implantable cardioverter-defibrillator (ICD) in situ    7. Hyperlipidemia with target LDL less than 130    8. HOCM (hypertrophic obstructive cardiomyopathy)    9. History of radiofrequency ablation (RFA) for complex right atrial arrhythmia    10. At risk for amiodarone toxicity with long term use    11.  Aromatase inhibitor use        Plan:      No orders of the defined types were placed in this encounter.    Follow up in about 6 months (around 6/7/2021), or if symptoms worsen or fail to improve.  There are no discontinued medications.     Medication List with Changes/Refills   Current Medications    ACETAMINOPHEN (TYLENOL ARTHRITIS ORAL)    Take by mouth.    AMIODARONE (PACERONE) 200 MG TAB    Take 1 tablet (200 mg total) by mouth once daily.    AMIODARONE (PACERONE) 200 MG TAB    Take one tablet everyday except for Wednesdays and Sundays    AMIODARONE (PACERONE) 200 MG TAB    Take 1 tablet (200 mg total) by mouth once daily.    ANTIOX.MV NO.10/OMEG3S/LUT/IFEANYI (I-CAPS ORAL)    Take 1 capsule by mouth once daily.    APIXABAN (ELIQUIS) 5 MG TAB    Take 1 tablet (5 mg total) by mouth 2 (two) times a day.    ATORVASTATIN (LIPITOR) 20 MG TABLET    TAKE 1 TABLET BY MOUTH AT BEDTIME    CALCIUM CITRATE-VITAMIN D3 315-200 MG (CITRACAL+D) 315-200 MG-UNIT PER TABLET    Take 1 tablet by mouth 2 (two) times daily.      LETROZOLE (FEMARA) 2.5 MG TAB    TAKE 1 TABLET(2.5 MG) BY MOUTH EVERY DAY.    METOPROLOL TARTRATE (LOPRESSOR) 100 MG TABLET    Take 1 tablet (100 mg total) by mouth 2 (two) times a day.

## 2020-12-07 NOTE — PROCEDURES
Large Joint Aspiration/Injection: bilateral knee    Date/Time: 12/7/2020 3:30 PM  Performed by: Edgar Ram MD  Authorized by: Edgar Ram MD     Consent Done?:  Yes (Verbal)  Indications:  Pain and joint swelling  Site marked: the procedure site was marked    Timeout: prior to procedure the correct patient, procedure, and site was verified    Prep: patient was prepped and draped in usual sterile fashion      Local anesthesia used?: Yes    Local anesthetic:  Topical anesthetic    Details:  Needle Size:  22 G  Ultrasonic Guidance for needle placement?: No    Approach:  Anterolateral  Location:  Knee  Laterality:  Bilateral  Site:  Bilateral knee  Medications (Right):  2 mL lidocaine HCL 10 mg/ml (1%) 10 mg/mL (1 %); 40 mg triamcinolone acetonide 40 mg/mL; 2 mL bupivacaine 0.5 % (5 mg/mL)  Medications (Left):  2 mL lidocaine HCL 10 mg/ml (1%) 10 mg/mL (1 %); 40 mg triamcinolone acetonide 40 mg/mL; 2 mL bupivacaine 0.5 % (5 mg/mL)  Patient tolerance:  Patient tolerated the procedure well with no immediate complications     We discussed the proper protocols after the injection such as no submerging pools, baths tubs, or hot tubs for 24 hr.  Showering is okay today.  We also discussed that blood sugars can be elevated after an injection and asked patient to properly checked her sugars over the next few days and contact their PCP if there are any concerns.  We discussed red flags such as fevers, chills, red, warm, tender joint at the area of injection to please seek medical care immediately.

## 2020-12-07 NOTE — PROGRESS NOTES
Patient ID: Karena Young  YOB: 1940  MRN: [id number]    Chief Complaint: Pain of the Left Knee and Pain of the Right Knee      Referred By: Dr. Lee Dwyer for RIGHT and LEFT knee pain    History of Present Illness: Karena Young is a right-hand dominant 80 y.o. female who presents today with bilateral knee pain with mention of hip pain.  It has been present for several years.   Pain is located in both knee(s).    The pain is described as constant, moderate.  The symptoms are worse with walking, and certain movements.  The patient has voltaren gel and tylenol arthritis 2x/day.   Related to injury: no.    The patient is active in none.  Occupation: Retired    This is a 80-year-old female with history of progressive dementia and currently on home hospice who presents today for bilateral knee pain.  She has pain with ambulation, as well as sometimes at rest.  She has tried some topical Voltaren without much relief and is taking Tylenol arthritis medicine twice a day.  She has never had corticosteroid injections or done physical therapy.  She is mostly homebound and lives next door to her daughter who was with her today to assist with some of the history.  There is no significant history of falls, and she uses a walker for ambulation all the time.    Past Medical History:   Past Medical History:   Diagnosis Date    *Atrial fibrillation     *Atrial flutter     Anticoagulant long-term use     Breast cancer     Cardiomyopathy     HOCM (hypertrophic obstructive cardiomyopathy) 9/6/2013    Hyperlipidemia     Hypertension     Macular degeneration     Orthostatic hypotension     Paroxysmal atrial fibrillation 9/12/2012    Ventricular tachycardia      Past Surgical History:   Procedure Laterality Date    BREAST BIOPSY      CARDIAC ELECTROPHYSIOLOGY STUDY AND ABLATION  4/06    CATARACT EXTRACTION      ICD implantation      INSERT / REPLACE / REMOVE PACEMAKER       RADIOFREQUENCY ABLATION       Family History   Problem Relation Age of Onset    Cancer Mother     Heart disease Mother     Heart disease Father     Cancer Brother 60     Social History     Socioeconomic History    Marital status:      Spouse name: marina pinzon    Number of children: 3    Years of education: Not on file    Highest education level: Not on file   Occupational History    Occupation: retired   Social Needs    Financial resource strain: Not hard at all    Food insecurity     Worry: Never true     Inability: Never true    Transportation needs     Medical: No     Non-medical: No   Tobacco Use    Smoking status: Never Smoker    Smokeless tobacco: Never Used   Substance and Sexual Activity    Alcohol use: No     Frequency: Never     Drinks per session: Patient refused     Binge frequency: Never    Drug use: No    Sexual activity: Not Currently     Partners: Male   Lifestyle    Physical activity     Days per week: 0 days     Minutes per session: Not on file    Stress: To some extent   Relationships    Social connections     Talks on phone: More than three times a week     Gets together: More than three times a week     Attends Latter day service: Not on file     Active member of club or organization: No     Attends meetings of clubs or organizations: Never     Relationship status:    Other Topics Concern    Not on file   Social History Narrative    Not on file     Medication List with Changes/Refills   Current Medications    ACETAMINOPHEN (TYLENOL ARTHRITIS ORAL)    Take by mouth.    AMIODARONE (PACERONE) 200 MG TAB    Take 1 tablet (200 mg total) by mouth once daily.    AMIODARONE (PACERONE) 200 MG TAB    Take one tablet everyday except for Wednesdays and Sundays    AMIODARONE (PACERONE) 200 MG TAB    Take 1 tablet (200 mg total) by mouth once daily.    ANTIOX.MV NO.10/OMEG3S/LUT/IFEANYI (I-CAPS ORAL)    Take 1 capsule by mouth once daily.    APIXABAN (ELIQUIS) 5 MG TAB     Take 1 tablet (5 mg total) by mouth 2 (two) times a day.    ATORVASTATIN (LIPITOR) 20 MG TABLET    TAKE 1 TABLET BY MOUTH AT BEDTIME    CALCIUM CITRATE-VITAMIN D3 315-200 MG (CITRACAL+D) 315-200 MG-UNIT PER TABLET    Take 1 tablet by mouth 2 (two) times daily.      LETROZOLE (FEMARA) 2.5 MG TAB    TAKE 1 TABLET(2.5 MG) BY MOUTH EVERY DAY.    METOPROLOL TARTRATE (LOPRESSOR) 100 MG TABLET    Take 1 tablet (100 mg total) by mouth 2 (two) times a day.     Review of patient's allergies indicates:  No Known Allergies  Review of Systems   Constitution: Negative for chills and fever.   HENT: Negative for congestion.    Eyes: Negative for photophobia.   Cardiovascular: Negative for chest pain and leg swelling.   Respiratory: Negative for shortness of breath.    Musculoskeletal: Positive for falls, joint pain and stiffness. Negative for myalgias.   Gastrointestinal: Negative for abdominal pain, constipation and diarrhea.   Neurological: Negative for headaches, numbness and weakness.       Physical Exam:   There is no height or weight on file to calculate BMI.    GENERAL: Well appearing, in no acute distress.  HEAD: Normocephalic and atraumatic.  ENT: External ears and nose grossly normal.  EYES: EOMI bilaterally  PULMONARY: Respirations are grossly even and non-labored.  NEURO: Awake, alert, and oriented x 3.  SKIN: No obvious rashes appreciated.  PSYCH: Mood & affect are appropriate.    Detailed MSK exam:     Bilateral knee exam, wind sweep alignment to the patient's left.    Crepitance and pain with active extension bilaterally tenderness over the lateral joint line of the right knee and medial joint line of the left knee.    Imaging:    Narrative & Impression     EXAMINATION:  XR KNEE ORTHO BILAT WITH FLEXION     CLINICAL HISTORY:  Pain in right knee     TECHNIQUE:  AP standing of both knees, PA flexion standing views of both knees, and Merchant views of both knees were performed.  Lateral views of both knees were also  performed.     COMPARISON:  None     FINDINGS:  Right knee: There is a trace joint effusion present.  No acute fractures or dislocations visualized.  Tricompartmental degenerative changes are present with severe joint space loss in the lateral compartment.  Multiple periarticular calcific densities noted, suspicious for intra-articular bodies.  There is some chondrocalcinosis noted in the medial compartment.     Left knee:  Trace joint effusion suspected.  No acute fractures or dislocations visualized.  There are tricompartmental degenerative changes present with severe joint space loss in the medial compartment.  There is some chondrocalcinosis noted in the lateral compartment.     Visualized osseous structures appear diffusely osteopenic.     Impression:     As above        Electronically signed by: Hao Iglesias MD  Date:                                            12/07/2020  Time:                                           15:39         Relevant imaging results were reviewed and interpreted by me and per my read bilateral tricompartment disease.  This was discussed with the patient and / or family today.     Assessment:  Karena Yonug is a 80 y.o. female presenting today for bilateral knee pain.  We discussed continuing walker with for ambulation, and also due to the fact that she is on home hospice at this time, and I am not able to order any home physical therapy.  We also discussed possible bracing to help with some of her alignment issues, but noted that it will be too much work to try and unload her knees bilaterally due to the fact that they would have to be taken on an off daily in with her mental status and memory issues, it may be more harm than good.  We discussed injections today and they are willing to proceed, as I think it will be a good pain reliever for her at this time.  Please refer to the procedure note for further details.  Over also like to get x-rays of her hip, and we will place  orders for her to get those done before she sees me again as she is having some hip issues as well.    Follow-up in 3 months    Chronic pain of both knees  -     Ambulatory referral/consult to Orthopedics  -     Large Joint Aspiration/Injection: bilateral knee         A copy of today's visit note has been sent to the referring provider.     Edgar Ram MD    Disclaimer: This note was prepared using a voice recognition system and is likely to have sound alike errors within the text.

## 2020-12-07 NOTE — LETTER
December 7, 2020      Lee Dwyer MD  [address] [place] [address]           [facility]  [address] [facility] [place] [address]  Phone: [phone]  Fax: [phone]          Patient: Karena Young   MR Number: [id number]   YOB: 1940   Date of Visit: 12/7/2020       Dear Dr. Lee Dwyer:    Thank you for referring Karena Young to me for evaluation. Attached you will find relevant portions of my assessment and plan of care.    If you have questions, please do not hesitate to call me. I look forward to following Karena Young along with you.    Sincerely,    Edgar Ram MD    Enclosure  CC:  No Recipients    If you would like to receive this communication electronically, please contact [email or web address] or [phone] to request more information on [facility] Link access.    For providers and/or their staff who would like to refer a patient to [facility], please contact us through our one-stop-shop provider referral line, [facility], at [phone].    If you feel you have received this communication in error or would no longer like to receive these types of communications, please e-mail [email or web address]

## 2021-01-27 ENCOUNTER — PATIENT MESSAGE (OUTPATIENT)
Dept: INTERNAL MEDICINE | Facility: CLINIC | Age: 81
End: 2021-01-27

## 2021-01-29 ENCOUNTER — TELEPHONE (OUTPATIENT)
Dept: INTERNAL MEDICINE | Facility: CLINIC | Age: 81
End: 2021-01-29

## 2021-01-31 NOTE — TELEPHONE ENCOUNTER
Called patient's daughter to follow up from previous discussion 5/18. Per daughter, BLE edema seemed to resolve on Saturday 5/19, returned 5/20 evening. Swelling approx 1-2 inches above ankle. Denies any other symptoms. Will discuss with Dr. Hauser and f/u again with pt.    Negative

## 2021-02-01 ENCOUNTER — HOSPITAL ENCOUNTER (OUTPATIENT)
Dept: RADIOLOGY | Facility: HOSPITAL | Age: 81
Discharge: HOME | End: 2021-02-01
Attending: NURSE PRACTITIONER
Payer: MEDICARE

## 2021-02-01 ENCOUNTER — OFFICE VISIT (OUTPATIENT)
Dept: INTERNAL MEDICINE | Facility: CLINIC | Age: 81
End: 2021-02-01
Payer: MEDICARE

## 2021-02-01 VITALS
WEIGHT: 126.13 LBS | DIASTOLIC BLOOD PRESSURE: 78 MMHG | HEART RATE: 64 BPM | BODY MASS INDEX: 21.53 KG/M2 | TEMPERATURE: 98 F | SYSTOLIC BLOOD PRESSURE: 136 MMHG | HEIGHT: 64 IN | RESPIRATION RATE: 18 BRPM

## 2021-02-01 DIAGNOSIS — E78.5 HYPERLIPIDEMIA, UNSPECIFIED HYPERLIPIDEMIA TYPE: ICD-10-CM

## 2021-02-01 DIAGNOSIS — I10 ESSENTIAL HYPERTENSION: ICD-10-CM

## 2021-02-01 DIAGNOSIS — I42.1 HOCM (HYPERTROPHIC OBSTRUCTIVE CARDIOMYOPATHY): ICD-10-CM

## 2021-02-01 DIAGNOSIS — I47.29 PAROXYSMAL VENTRICULAR TACHYCARDIA: ICD-10-CM

## 2021-02-01 DIAGNOSIS — Z11.1 SCREENING-PULMONARY TB: ICD-10-CM

## 2021-02-01 DIAGNOSIS — E55.9 VITAMIN D DEFICIENCY: ICD-10-CM

## 2021-02-01 DIAGNOSIS — E78.5 HYPERLIPIDEMIA WITH TARGET LDL LESS THAN 130: Chronic | ICD-10-CM

## 2021-02-01 DIAGNOSIS — Z17.0 MALIGNANT NEOPLASM OF UPPER-OUTER QUADRANT OF RIGHT BREAST IN FEMALE, ESTROGEN RECEPTOR POSITIVE: ICD-10-CM

## 2021-02-01 DIAGNOSIS — I48.20 CHRONIC ATRIAL FIBRILLATION: ICD-10-CM

## 2021-02-01 DIAGNOSIS — F03.90 DEMENTIA WITHOUT BEHAVIORAL DISTURBANCE, UNSPECIFIED DEMENTIA TYPE: Primary | ICD-10-CM

## 2021-02-01 DIAGNOSIS — M85.80 OSTEOPENIA, UNSPECIFIED LOCATION: Chronic | ICD-10-CM

## 2021-02-01 DIAGNOSIS — C50.411 MALIGNANT NEOPLASM OF UPPER-OUTER QUADRANT OF RIGHT BREAST IN FEMALE, ESTROGEN RECEPTOR POSITIVE: ICD-10-CM

## 2021-02-01 PROCEDURE — 99214 PR OFFICE/OUTPT VISIT, EST, LEVL IV, 30-39 MIN: ICD-10-PCS | Mod: S$GLB,,, | Performed by: NURSE PRACTITIONER

## 2021-02-01 PROCEDURE — 3288F FALL RISK ASSESSMENT DOCD: CPT | Mod: CPTII,S$GLB,, | Performed by: NURSE PRACTITIONER

## 2021-02-01 PROCEDURE — 1126F AMNT PAIN NOTED NONE PRSNT: CPT | Mod: S$GLB,,, | Performed by: NURSE PRACTITIONER

## 2021-02-01 PROCEDURE — 71046 XR CHEST PA AND LATERAL: ICD-10-PCS | Mod: 26,,, | Performed by: RADIOLOGY

## 2021-02-01 PROCEDURE — 3075F SYST BP GE 130 - 139MM HG: CPT | Mod: CPTII,S$GLB,, | Performed by: NURSE PRACTITIONER

## 2021-02-01 PROCEDURE — 1101F PR PT FALLS ASSESS DOC 0-1 FALLS W/OUT INJ PAST YR: ICD-10-PCS | Mod: CPTII,S$GLB,, | Performed by: NURSE PRACTITIONER

## 2021-02-01 PROCEDURE — 99999 PR PBB SHADOW E&M-EST. PATIENT-LVL III: ICD-10-PCS | Mod: PBBFAC,,, | Performed by: NURSE PRACTITIONER

## 2021-02-01 PROCEDURE — 1126F PR PAIN SEVERITY QUANTIFIED, NO PAIN PRESENT: ICD-10-PCS | Mod: S$GLB,,, | Performed by: NURSE PRACTITIONER

## 2021-02-01 PROCEDURE — 3078F PR MOST RECENT DIASTOLIC BLOOD PRESSURE < 80 MM HG: ICD-10-PCS | Mod: CPTII,S$GLB,, | Performed by: NURSE PRACTITIONER

## 2021-02-01 PROCEDURE — 99214 OFFICE O/P EST MOD 30 MIN: CPT | Mod: S$GLB,,, | Performed by: NURSE PRACTITIONER

## 2021-02-01 PROCEDURE — 99999 PR PBB SHADOW E&M-EST. PATIENT-LVL III: CPT | Mod: PBBFAC,,, | Performed by: NURSE PRACTITIONER

## 2021-02-01 PROCEDURE — 3078F DIAST BP <80 MM HG: CPT | Mod: CPTII,S$GLB,, | Performed by: NURSE PRACTITIONER

## 2021-02-01 PROCEDURE — 71046 X-RAY EXAM CHEST 2 VIEWS: CPT | Mod: 26,,, | Performed by: RADIOLOGY

## 2021-02-01 PROCEDURE — 1101F PT FALLS ASSESS-DOCD LE1/YR: CPT | Mod: CPTII,S$GLB,, | Performed by: NURSE PRACTITIONER

## 2021-02-01 PROCEDURE — 3288F PR FALLS RISK ASSESSMENT DOCUMENTED: ICD-10-PCS | Mod: CPTII,S$GLB,, | Performed by: NURSE PRACTITIONER

## 2021-02-01 PROCEDURE — 1159F MED LIST DOCD IN RCRD: CPT | Mod: S$GLB,,, | Performed by: NURSE PRACTITIONER

## 2021-02-01 PROCEDURE — 3075F PR MOST RECENT SYSTOLIC BLOOD PRESS GE 130-139MM HG: ICD-10-PCS | Mod: CPTII,S$GLB,, | Performed by: NURSE PRACTITIONER

## 2021-02-01 PROCEDURE — 1159F PR MEDICATION LIST DOCUMENTED IN MEDICAL RECORD: ICD-10-PCS | Mod: S$GLB,,, | Performed by: NURSE PRACTITIONER

## 2021-02-01 PROCEDURE — 71046 X-RAY EXAM CHEST 2 VIEWS: CPT | Mod: TC,FY,PO

## 2021-02-04 ENCOUNTER — PATIENT MESSAGE (OUTPATIENT)
Dept: INTERNAL MEDICINE | Facility: CLINIC | Age: 81
End: 2021-02-04

## 2021-02-08 ENCOUNTER — PATIENT MESSAGE (OUTPATIENT)
Dept: INTERNAL MEDICINE | Facility: CLINIC | Age: 81
End: 2021-02-08

## 2021-02-24 ENCOUNTER — PATIENT MESSAGE (OUTPATIENT)
Dept: INTERNAL MEDICINE | Facility: CLINIC | Age: 81
End: 2021-02-24

## 2021-02-26 ENCOUNTER — TELEPHONE (OUTPATIENT)
Dept: INTERNAL MEDICINE | Facility: CLINIC | Age: 81
End: 2021-02-26

## 2021-04-12 ENCOUNTER — PATIENT MESSAGE (OUTPATIENT)
Dept: INTERNAL MEDICINE | Facility: CLINIC | Age: 81
End: 2021-04-12

## 2021-05-19 DIAGNOSIS — I48.20 CHRONIC A-FIB: ICD-10-CM

## 2021-12-06 ENCOUNTER — PATIENT OUTREACH (OUTPATIENT)
Dept: ADMINISTRATIVE | Facility: HOSPITAL | Age: 81
End: 2021-12-06
Payer: MEDICARE

## 2022-04-27 ENCOUNTER — PATIENT MESSAGE (OUTPATIENT)
Dept: ADMINISTRATIVE | Facility: HOSPITAL | Age: 82
End: 2022-04-27
Payer: MEDICARE

## 2022-07-11 NOTE — PROGRESS NOTES
"  Subjective:   Patient ID:  Karena Young is a 77 y.o. female     Chief complaint:Cardiomyopathy      HPI  Background as recorded in my last note (12/12/16):  73 woman   History of HOCM s/p ETOH ablation 4/06, ventricular tachycardia, ICD, HTN, orthostatic hypotension, and HLP.Also noted to have transient A Flutter and AF in 2009 and 2010. Has had no AF since amiodarone -- She also later had VT and is on amio for that now -- since 2012   ICD initially implanted by Dr Gonzales in 11/04 for syncope with VTNS>>replaced in 6/2008   Had a syncopal spell 2/20/12 -- ICD revealed event at 3:44PM-- 4 sec VTNS, also had NSVT x 7; longest 7 seconds on 1/1/12>>started on amiodarone and most recent ICD interrogation revealed no NSVT. She has had no further syncope and is without complaints of dizziness. She denies any palpitations, sob, chest pain, or edema.   In past had AFl as well as AF noted by ICD and ATPed--   Mild CKD -- Cr 1.3 to 1.4 -- stable   CHADS=1 (HTN) on aspirin   DLCO normal 6/5/12, 6/2014 82%, FVC 73% TSH 6/15 was WNL -- 2.5  Had ICD replacement and lead was buried deeper in pocket In August 2013 -- she is doing well and feels quite good --her main c/o at this time is "hallcucinations" at night. She has some HOBBS (climbing stairs etc -- class II)   ICD eval today shows no dysrhythmias   V pacing was 0% and A was 76%-- battery at [facility]-- she had an ICD shock in may during breast bx / cautery -- there were multiple noise pick ups but only one shock-- she was asleep.  Her only c/o is occ orthostatic dizziness -- this is mild but noticeable -- BPs at home are 100-110/60 to 70  She has no SOB and she can walk ~ a block or so s issues     Echo was in 6/11  >>  1 - Normal left ventricular function (EF 60%).   2 - Diastolic dysfunction.   3 - Severe left atrial enlargement. BAXTER 52   4 - Trivial to mild aortic regurgitation.   5 - Mild mitral regurgitation.   6 - Mild to moderate tricuspid regurgitation.   7 - " Mild pulmonary hypertension.      Last echo from 6/2014:  >>  1 - Concentric remodeling.   2 - Normal left ventricular systolic function (EF 60-65%).   3 - Normal right ventricular systolic function .   4 - Left ventricular diastolic dysfunction.   5 - Severe left atrial enlargement. BAXTER 42  6 - Mild aortic regurgitation.   7 - Mild mitral regurgitation.   8 - Mild pulmonary hypertension.   9 - Mildly elevated central venous pressure.    ECG today shows AR pacing with RBBB-- 60 bpm-      Update till 12/12/16:  She has been on slo mo for a year or more but stable  Gets HOBBS at less than a block  Sleeps 12 hrs a night and then takes a 2 hr nap  She has lost her  recently last sept and she has moved to her sister's house.   ECG today shows AR pacing with FDAVB and RBBB    Update since then:  She had two ICD shocks last week -- she was ASxc prior. The shocks were one at a time --  by 3 min. This was related to A Flutter  She feels O/W well -- no Fc limitations  I have reviewed the actual image of the ECG tracing obtained today and it shows NSR with RBBB,FDAVB- mild    Current Outpatient Prescriptions   Medication Sig    amiodarone (PACERONE) 200 MG Tab Take 1 tablet (200 mg total) by mouth once daily.    aspirin 81 mg Tab Take 1 tablet by mouth Daily.     atenolol (TENORMIN) 50 MG tablet Take 1 tablet (50 mg total) by mouth every evening.    atorvastatin (LIPITOR) 20 MG tablet TAKE 1 TABLET BY MOUTH EVERY DAY    calcium citrate-vitamin D3 315-200 mg (CITRACAL+D) 315-200 mg-unit per tablet Take 1 tablet by mouth 2 (two) times daily.      donepezil (ARICEPT) 10 MG tablet Take 1 tablet (10 mg total) by mouth every evening.    hydrochlorothiazide (MICROZIDE) 12.5 mg capsule TAKE 1 CAPSULE(12.5 MG) BY MOUTH EVERY DAY    letrozole (FEMARA) 2.5 mg Tab Take 1 tablet (2.5 mg total) by mouth once daily.    lisinopril 10 MG tablet Take 0.5 tablets (5 mg total) by mouth once daily. 1 tablet Oral Every  [FreeTextEntry1] : HFM\par Continue supportive care\par Self limiting]\par Call if condition worsening\par ED for Decreased UOP >Q 8 hours, decreased intake of liquids\par  day    metoprolol succinate (TOPROL-XL) 50 MG 24 hr tablet Take 1 tablet (50 mg total) by mouth once daily.    walker (ULTRA-LIGHT ROLLATOR) Misc 1 each by Misc.(Non-Drug; Combo Route) route daily as needed.    cholecalciferol, vitamin D3, 1,000 unit capsule Take 1 capsule (1,000 Units total) by mouth once daily.     No current facility-administered medications for this visit.      Review of Systems   Constitution: Negative for decreased appetite, weakness, weight gain and weight loss.   HENT: Negative for nosebleeds.    Eyes: Negative for blurred vision and visual disturbance.   Cardiovascular: Negative for chest pain, claudication, cyanosis, dyspnea on exertion, irregular heartbeat, leg swelling, near-syncope, orthopnea, palpitations, paroxysmal nocturnal dyspnea and syncope.   Respiratory: Negative for cough, shortness of breath and wheezing.    Endocrine: Negative for heat intolerance.   Skin: Negative for rash.   Musculoskeletal: Negative for muscle weakness and myalgias.   Gastrointestinal: Negative for abdominal pain, anorexia, melena, nausea and vomiting.   Genitourinary: Negative for menorrhagia.   Neurological: Negative for excessive daytime sleepiness, dizziness, headaches, loss of balance, seizures and vertigo.   Psychiatric/Behavioral: Negative for altered mental status and depression. The patient is not nervous/anxious.        Objective:   Physical Exam   Constitutional: She is oriented to person, place, and time. She appears well-developed and well-nourished.   HENT:   Head: Normocephalic and atraumatic.   Right Ear: External ear normal.   Left Ear: External ear normal.   Eyes: Conjunctivae are normal. Pupils are equal, round, and reactive to light. Left eye exhibits no discharge. No scleral icterus.   Neck: Normal range of motion. Neck supple. No thyromegaly present.   Cardiovascular: Normal rate, regular rhythm, normal heart sounds and intact distal pulses.  Exam reveals no gallop, no S3, no S4,  "no friction rub, no midsystolic click and no opening snap.    No murmur heard.  Pulses:       Carotid pulses are 2+ on the right side, and 2+ on the left side.       Radial pulses are 2+ on the right side, and 2+ on the left side.        Dorsalis pedis pulses are 2+ on the right side, and 2+ on the left side.        Posterior tibial pulses are 2+ on the right side, and 2+ on the left side.   Pulmonary/Chest: Effort normal and breath sounds normal.   Device pocket is in excellent repair   Abdominal: Soft. She exhibits no distension. There is no hepatomegaly. There is no tenderness. There is no guarding.   Musculoskeletal:        Right ankle: She exhibits no swelling.        Left ankle: She exhibits no swelling.        Right lower leg: She exhibits no swelling.        Left lower leg: She exhibits no swelling.   Neurological: She is alert and oriented to person, place, and time. She has normal strength. No cranial nerve deficit. Gait normal.   Skin: Skin is warm, dry and intact. No rash noted. No cyanosis. Nails show no clubbing.   Psychiatric: She has a normal mood and affect. Her speech is normal and behavior is normal. Thought content normal. Cognition and memory are normal.   Nursing note and vitals reviewed.    /65   Pulse 64   Ht 5' 4" (1.626 m)   Wt 52.6 kg (115 lb 15.4 oz)   BMI 19.90 kg/m²      Assessment:      1. Atrial flutter, unspecified type    2. Chronic atrial fibrillation    3. HOCM (hypertrophic obstructive cardiomyopathy)    4. At risk for amiodarone toxicity with long term use    5. RBBB    6. Uncontrolled stage 2 hypertension    7. Subclinical hypothyroidism    8. Hyperlipidemia with target LDL less than 130    9. Paroxysmal ventricular tachycardia    10. Implantable cardioverter-defibrillator (ICD) in situ    11. Malignant neoplasm of upper-outer quadrant of right breast in female, estrogen receptor positive        Plan:    Increase amio to 200 a day  Elective EP/RFA CTI  As needed.   I " have discussed the procedure in detail with the patient. I described its benefits and risks. I reviewed alternative therapies and discussed their potential value. The patient was given ample opportunity to express concerns and ask questions and I provided appropriate responses and  answers to such.The patient understands and agrees to proceed.  Consent form was signed today by patient and myself and appropriately witnessed.     No orders of the defined types were placed in this encounter.    Return in about 6 months (around 4/12/2018), or post EP/RFA.  Medications Discontinued During This Encounter   Medication Reason    amiodarone (PACERONE) 100 MG Tab Reorder     New Prescriptions    No medications on file     Modified Medications    Modified Medication Previous Medication    AMIODARONE (PACERONE) 200 MG TAB amiodarone (PACERONE) 100 MG Tab       Take 1 tablet (200 mg total) by mouth once daily.    Take 1 tablet (100 mg total) by mouth once daily.

## 2022-07-27 ENCOUNTER — PATIENT MESSAGE (OUTPATIENT)
Dept: ADMINISTRATIVE | Facility: HOSPITAL | Age: 82
End: 2022-07-27
Payer: MEDICARE

## 2022-10-20 ENCOUNTER — PATIENT MESSAGE (OUTPATIENT)
Dept: ADMINISTRATIVE | Facility: HOSPITAL | Age: 82
End: 2022-10-20
Payer: MEDICARE
